# Patient Record
Sex: FEMALE | Race: WHITE | Employment: OTHER | URBAN - METROPOLITAN AREA
[De-identification: names, ages, dates, MRNs, and addresses within clinical notes are randomized per-mention and may not be internally consistent; named-entity substitution may affect disease eponyms.]

---

## 2017-01-09 ENCOUNTER — OFFICE VISIT (OUTPATIENT)
Dept: INTERNAL MEDICINE CLINIC | Age: 67
End: 2017-01-09

## 2017-01-09 ENCOUNTER — HOSPITAL ENCOUNTER (OUTPATIENT)
Dept: LAB | Age: 67
Discharge: HOME | End: 2017-01-09
Payer: MEDICARE

## 2017-01-09 VITALS
HEART RATE: 68 BPM | WEIGHT: 117 LBS | BODY MASS INDEX: 20.73 KG/M2 | SYSTOLIC BLOOD PRESSURE: 140 MMHG | OXYGEN SATURATION: 98 % | RESPIRATION RATE: 16 BRPM | TEMPERATURE: 98.2 F | HEIGHT: 63 IN | DIASTOLIC BLOOD PRESSURE: 82 MMHG

## 2017-01-09 DIAGNOSIS — R73.02 IMPAIRED GLUCOSE TOLERANCE: ICD-10-CM

## 2017-01-09 DIAGNOSIS — E78.5 DYSLIPIDEMIA: Primary | ICD-10-CM

## 2017-01-09 DIAGNOSIS — E56.9 VITAMIN DEFICIENCY: ICD-10-CM

## 2017-01-09 DIAGNOSIS — I10 HTN, GOAL BELOW 130/80: ICD-10-CM

## 2017-01-09 DIAGNOSIS — K76.0 NAFLD (NONALCOHOLIC FATTY LIVER DISEASE): ICD-10-CM

## 2017-01-09 PROCEDURE — 83036 HEMOGLOBIN GLYCOSYLATED A1C: CPT

## 2017-01-09 PROCEDURE — 80053 COMPREHEN METABOLIC PANEL: CPT

## 2017-01-09 PROCEDURE — 82306 VITAMIN D 25 HYDROXY: CPT

## 2017-01-09 PROCEDURE — 80061 LIPID PANEL: CPT

## 2017-01-09 PROCEDURE — 36415 COLL VENOUS BLD VENIPUNCTURE: CPT

## 2017-01-09 PROCEDURE — 84443 ASSAY THYROID STIM HORMONE: CPT

## 2017-01-09 RX ORDER — LOSARTAN POTASSIUM 100 MG/1
100 TABLET ORAL DAILY
Qty: 90 TAB | Refills: 2 | Status: SHIPPED | OUTPATIENT
Start: 2017-01-09 | End: 2017-02-15 | Stop reason: ALTCHOICE

## 2017-01-09 RX ORDER — LOVASTATIN 20 MG/1
TABLET ORAL
Qty: 90 TAB | Refills: 1 | Status: SHIPPED | OUTPATIENT
Start: 2017-01-09 | End: 2017-07-25 | Stop reason: SDUPTHER

## 2017-01-09 RX ORDER — ATENOLOL 50 MG/1
TABLET ORAL
Qty: 180 TAB | Refills: 1 | Status: SHIPPED | OUTPATIENT
Start: 2017-01-09 | End: 2017-07-25 | Stop reason: SDUPTHER

## 2017-01-09 NOTE — PROGRESS NOTES
HISTORY OF PRESENT ILLNESS  Kinga Whitney is a 78 y.o. female. HPI  Follow up, doing very well. Issues:  1. Hypertension. Tolerates the Losartan 50 mg. Energy is good. No chest pain or shortness of breath. Pressure is a bit high so we are going to bump to the 100 mg dose. 2. Dyslipidemia, on Lovastatin. No myalgias. Due for labs. 3. Intermittent episode of a cramp that she feels in her left upper quadrant, really only when under stress. Notes that when she is relaxed it does not happen. It is not associated with meals, nausea or vomiting, and has been intermittently present for at least a year. Review of Systems   Constitutional: Negative for chills, fever and weight loss. Respiratory: Negative for cough, shortness of breath and wheezing. Cardiovascular: Negative for chest pain, palpitations, orthopnea, leg swelling and PND. Gastrointestinal: Negative for abdominal pain, blood in stool, constipation, diarrhea, heartburn and nausea. Episodic cramp in abd when feels tense   Genitourinary: Negative for dysuria. Musculoskeletal: Negative for myalgias. Neurological: Negative for dizziness and headaches. Physical Exam   Constitutional: She is oriented to person, place, and time. She appears well-developed and well-nourished. HENT:   Head: Normocephalic and atraumatic. Mouth/Throat: Oropharynx is clear and moist.   Neck: Normal range of motion. Neck supple. Carotid bruit is not present. No thyromegaly present. Cardiovascular: Normal rate, regular rhythm, S1 normal, S2 normal, normal heart sounds and intact distal pulses. No murmur heard. Pulmonary/Chest: Effort normal and breath sounds normal. No respiratory distress. She has no wheezes. She has no rales. Abdominal: Soft. Bowel sounds are normal. She exhibits no distension and no mass. There is no tenderness. Musculoskeletal: She exhibits no edema.    Neurological: She is alert and oriented to person, place, and time.   Psychiatric: She has a normal mood and affect. Her behavior is normal.   Nursing note and vitals reviewed. ASSESSMENT and PLAN  Judi Higgins was seen today for cholesterol problem, labs and abdominal pain. Diagnoses and all orders for this visit:    Dyslipidemia  -     lovastatin (MEVACOR) 20 mg tablet; TAKE ONE TABLET BY MOUTH NIGHTLY  -     METABOLIC PANEL, COMPREHENSIVE  -     LIPID PANEL  -     TSH 3RD GENERATION    HTN, goal below 130/80  -     atenolol (TENORMIN) 50 mg tablet; TAKE ONE TABLET BY MOUTH TWICE DAILY  -     losartan (COZAAR) 100 mg tablet; Take 1 Tab by mouth daily.     NAFLD (nonalcoholic fatty liver disease)    Impaired glucose tolerance  -     HEMOGLOBIN A1C WITH EAG    Vitamin deficiency  -     VITAMIN D, 25 HYDROXY      the following changes in treatment are made: inc from 50 to 100 mg losartan  See me if any change in pattern of abd sxs-now only with stress and no change over the last year

## 2017-01-10 LAB
25(OH)D3+25(OH)D2 SERPL-MCNC: 56.4 NG/ML (ref 30–100)
ALBUMIN SERPL-MCNC: 4.7 G/DL (ref 3.6–4.8)
ALBUMIN/GLOB SERPL: 2.4 {RATIO} (ref 1.1–2.5)
ALP SERPL-CCNC: 72 IU/L (ref 39–117)
ALT SERPL-CCNC: 49 IU/L (ref 0–32)
AST SERPL-CCNC: 89 IU/L (ref 0–40)
BILIRUB SERPL-MCNC: 0.7 MG/DL (ref 0–1.2)
BUN SERPL-MCNC: 11 MG/DL (ref 8–27)
BUN/CREAT SERPL: 17 (ref 11–26)
CALCIUM SERPL-MCNC: 9.6 MG/DL (ref 8.7–10.3)
CHLORIDE SERPL-SCNC: 99 MMOL/L (ref 96–106)
CHOLEST SERPL-MCNC: 199 MG/DL (ref 100–199)
CO2 SERPL-SCNC: 26 MMOL/L (ref 18–29)
CREAT SERPL-MCNC: 0.66 MG/DL (ref 0.57–1)
EST. AVERAGE GLUCOSE BLD GHB EST-MCNC: 105 MG/DL
GLOBULIN SER CALC-MCNC: 2 G/DL (ref 1.5–4.5)
GLUCOSE SERPL-MCNC: 111 MG/DL (ref 65–99)
HBA1C MFR BLD: 5.3 % (ref 4.8–5.6)
HDLC SERPL-MCNC: 89 MG/DL
INTERPRETATION, 910389: NORMAL
LDLC SERPL CALC-MCNC: 84 MG/DL (ref 0–99)
POTASSIUM SERPL-SCNC: 4.6 MMOL/L (ref 3.5–5.2)
PROT SERPL-MCNC: 6.7 G/DL (ref 6–8.5)
SODIUM SERPL-SCNC: 142 MMOL/L (ref 134–144)
TRIGL SERPL-MCNC: 132 MG/DL (ref 0–149)
TSH SERPL DL<=0.005 MIU/L-ACNC: 2.1 UIU/ML (ref 0.45–4.5)
VLDLC SERPL CALC-MCNC: 26 MG/DL (ref 5–40)

## 2017-02-03 ENCOUNTER — HOSPITAL ENCOUNTER (INPATIENT)
Age: 67
LOS: 1 days | Discharge: HOME | DRG: 311 | End: 2017-02-04
Attending: EMERGENCY MEDICINE | Admitting: INTERNAL MEDICINE
Payer: MEDICARE

## 2017-02-03 DIAGNOSIS — R77.8 ELEVATED TROPONIN: Primary | ICD-10-CM

## 2017-02-03 PROBLEM — I21.4 NSTEMI (NON-ST ELEVATED MYOCARDIAL INFARCTION) (HCC): Status: ACTIVE | Noted: 2017-02-03

## 2017-02-03 LAB
ALBUMIN SERPL BCP-MCNC: 4.2 G/DL (ref 3.5–5)
ALBUMIN/GLOB SERPL: 1.2 {RATIO} (ref 1.1–2.2)
ALP SERPL-CCNC: 79 U/L (ref 45–117)
ALT SERPL-CCNC: 76 U/L (ref 12–78)
ANION GAP BLD CALC-SCNC: 8 MMOL/L (ref 5–15)
APPEARANCE UR: CLEAR
AST SERPL W P-5'-P-CCNC: 95 U/L (ref 15–37)
BACTERIA URNS QL MICRO: NEGATIVE /HPF
BASOPHILS # BLD AUTO: 0.1 K/UL (ref 0–0.1)
BASOPHILS # BLD: 1 % (ref 0–1)
BILIRUB SERPL-MCNC: 0.8 MG/DL (ref 0.2–1)
BILIRUB UR QL: NEGATIVE
BUN SERPL-MCNC: 14 MG/DL (ref 6–20)
BUN/CREAT SERPL: 21 (ref 12–20)
CALCIUM SERPL-MCNC: 9.1 MG/DL (ref 8.5–10.1)
CHLORIDE SERPL-SCNC: 103 MMOL/L (ref 97–108)
CO2 SERPL-SCNC: 28 MMOL/L (ref 21–32)
COLOR UR: ABNORMAL
CREAT SERPL-MCNC: 0.66 MG/DL (ref 0.55–1.02)
DIFFERENTIAL METHOD BLD: ABNORMAL
EOSINOPHIL # BLD: 0.1 K/UL (ref 0–0.4)
EOSINOPHIL NFR BLD: 1 % (ref 0–7)
EPITH CASTS URNS QL MICRO: ABNORMAL /LPF
ERYTHROCYTE [DISTWIDTH] IN BLOOD BY AUTOMATED COUNT: 11.9 % (ref 11.5–14.5)
GLOBULIN SER CALC-MCNC: 3.4 G/DL (ref 2–4)
GLUCOSE SERPL-MCNC: 103 MG/DL (ref 65–100)
GLUCOSE UR STRIP.AUTO-MCNC: NEGATIVE MG/DL
HCT VFR BLD AUTO: 39.4 % (ref 35–47)
HGB BLD-MCNC: 13 G/DL (ref 11.5–16)
HGB UR QL STRIP: ABNORMAL
KETONES UR QL STRIP.AUTO: ABNORMAL MG/DL
LEUKOCYTE ESTERASE UR QL STRIP.AUTO: ABNORMAL
LYMPHOCYTES # BLD AUTO: 34 % (ref 12–49)
LYMPHOCYTES # BLD: 2.5 K/UL (ref 0.8–3.5)
MCH RBC QN AUTO: 33.1 PG (ref 26–34)
MCHC RBC AUTO-ENTMCNC: 33 G/DL (ref 30–36.5)
MCV RBC AUTO: 100.3 FL (ref 80–99)
MONOCYTES # BLD: 0.8 K/UL (ref 0–1)
MONOCYTES NFR BLD AUTO: 10 % (ref 5–13)
NEUTS SEG # BLD: 4 K/UL (ref 1.8–8)
NEUTS SEG NFR BLD AUTO: 54 % (ref 32–75)
NITRITE UR QL STRIP.AUTO: NEGATIVE
PH UR STRIP: 6.5 [PH] (ref 5–8)
PLATELET # BLD AUTO: 169 K/UL (ref 150–400)
POTASSIUM SERPL-SCNC: 4.7 MMOL/L (ref 3.5–5.1)
PROT SERPL-MCNC: 7.6 G/DL (ref 6.4–8.2)
PROT UR STRIP-MCNC: NEGATIVE MG/DL
RBC # BLD AUTO: 3.93 M/UL (ref 3.8–5.2)
RBC #/AREA URNS HPF: ABNORMAL /HPF (ref 0–5)
SODIUM SERPL-SCNC: 139 MMOL/L (ref 136–145)
SP GR UR REFRACTOMETRY: 1.01 (ref 1–1.03)
TROPONIN I SERPL-MCNC: 0.14 NG/ML
UROBILINOGEN UR QL STRIP.AUTO: 0.2 EU/DL (ref 0.2–1)
WBC # BLD AUTO: 7.4 K/UL (ref 3.6–11)
WBC URNS QL MICRO: ABNORMAL /HPF (ref 0–4)

## 2017-02-03 PROCEDURE — 80053 COMPREHEN METABOLIC PANEL: CPT | Performed by: EMERGENCY MEDICINE

## 2017-02-03 PROCEDURE — 84484 ASSAY OF TROPONIN QUANT: CPT | Performed by: EMERGENCY MEDICINE

## 2017-02-03 PROCEDURE — 65270000029 HC RM PRIVATE

## 2017-02-03 PROCEDURE — 99285 EMERGENCY DEPT VISIT HI MDM: CPT

## 2017-02-03 PROCEDURE — 36415 COLL VENOUS BLD VENIPUNCTURE: CPT | Performed by: EMERGENCY MEDICINE

## 2017-02-03 PROCEDURE — 93005 ELECTROCARDIOGRAM TRACING: CPT

## 2017-02-03 PROCEDURE — 94761 N-INVAS EAR/PLS OXIMETRY MLT: CPT

## 2017-02-03 PROCEDURE — 74011250637 HC RX REV CODE- 250/637: Performed by: EMERGENCY MEDICINE

## 2017-02-03 PROCEDURE — 83036 HEMOGLOBIN GLYCOSYLATED A1C: CPT | Performed by: INTERNAL MEDICINE

## 2017-02-03 PROCEDURE — 74011250636 HC RX REV CODE- 250/636: Performed by: INTERNAL MEDICINE

## 2017-02-03 PROCEDURE — 85025 COMPLETE CBC W/AUTO DIFF WBC: CPT | Performed by: EMERGENCY MEDICINE

## 2017-02-03 PROCEDURE — 81001 URINALYSIS AUTO W/SCOPE: CPT | Performed by: EMERGENCY MEDICINE

## 2017-02-03 RX ORDER — ENOXAPARIN SODIUM 100 MG/ML
40 INJECTION SUBCUTANEOUS EVERY 24 HOURS
Status: DISCONTINUED | OUTPATIENT
Start: 2017-02-03 | End: 2017-02-04 | Stop reason: HOSPADM

## 2017-02-03 RX ORDER — SODIUM CHLORIDE 0.9 % (FLUSH) 0.9 %
5-10 SYRINGE (ML) INJECTION EVERY 8 HOURS
Status: DISCONTINUED | OUTPATIENT
Start: 2017-02-03 | End: 2017-02-04 | Stop reason: HOSPADM

## 2017-02-03 RX ORDER — HYDROCODONE BITARTRATE AND ACETAMINOPHEN 5; 325 MG/1; MG/1
1 TABLET ORAL
Status: DISCONTINUED | OUTPATIENT
Start: 2017-02-03 | End: 2017-02-04 | Stop reason: HOSPADM

## 2017-02-03 RX ORDER — SODIUM CHLORIDE 0.9 % (FLUSH) 0.9 %
5-10 SYRINGE (ML) INJECTION AS NEEDED
Status: DISCONTINUED | OUTPATIENT
Start: 2017-02-03 | End: 2017-02-04 | Stop reason: HOSPADM

## 2017-02-03 RX ORDER — LOVASTATIN 20 MG/1
20 TABLET ORAL DAILY
Status: DISCONTINUED | OUTPATIENT
Start: 2017-02-04 | End: 2017-02-04 | Stop reason: HOSPADM

## 2017-02-03 RX ORDER — LOSARTAN POTASSIUM 50 MG/1
100 TABLET ORAL DAILY
Status: DISCONTINUED | OUTPATIENT
Start: 2017-02-04 | End: 2017-02-04 | Stop reason: HOSPADM

## 2017-02-03 RX ORDER — ASPIRIN 81 MG/1
81 TABLET ORAL DAILY
Status: DISCONTINUED | OUTPATIENT
Start: 2017-02-04 | End: 2017-02-04 | Stop reason: HOSPADM

## 2017-02-03 RX ORDER — GUAIFENESIN 100 MG/5ML
324 LIQUID (ML) ORAL
Status: COMPLETED | OUTPATIENT
Start: 2017-02-03 | End: 2017-02-03

## 2017-02-03 RX ORDER — ATENOLOL 50 MG/1
50 TABLET ORAL EVERY 12 HOURS
Status: DISCONTINUED | OUTPATIENT
Start: 2017-02-04 | End: 2017-02-04 | Stop reason: HOSPADM

## 2017-02-03 RX ORDER — HYDRALAZINE HYDROCHLORIDE 20 MG/ML
20 INJECTION INTRAMUSCULAR; INTRAVENOUS
Status: DISCONTINUED | OUTPATIENT
Start: 2017-02-03 | End: 2017-02-04 | Stop reason: HOSPADM

## 2017-02-03 RX ADMIN — Medication 10 ML: at 23:40

## 2017-02-03 RX ADMIN — ENOXAPARIN SODIUM 40 MG: 40 INJECTION SUBCUTANEOUS at 23:40

## 2017-02-03 RX ADMIN — ASPIRIN 81 MG CHEWABLE TABLET 324 MG: 81 TABLET CHEWABLE at 23:39

## 2017-02-03 NOTE — IP AVS SNAPSHOT
Bhupinder Handy 
 
 
 [address] 100[address] [facility] 
[phone] Patient: Jenni Enciso MRN: [id number] VQY:0/4/0101 You are allergic to the following Allergen Reactions Shellfish Derived Diarrhea Nausea and Vomiting Recent Documentation Height Weight BMI OB Status Smoking Status 1.6 m 53 kg 20.71 kg/m2 Hysterectomy Former Smoker Unresulted Labs Order Current Status CULTURE, URINE In process Emergency Contacts Name Discharge Info Relation Home Work Mobile Anibal Pathak DISCHARGE CAREGIVER [3] Child [2] [phone] About your hospitalization You were admitted on:  February 3, 2017 You last received care in the:  [facility] 3 PRO CARE TELE 2 You were discharged on:  February 4, 2017 Unit phone number:  [phone] Why you were hospitalized Your primary diagnosis was:  Not on File Your diagnoses also included:  Impaired Glucose Tolerance, Dyslipidemia, Htn, Goal Below 130/80, Pyuria Providers Seen During Your Hospitalizations Provider Role Specialty Primary office phone Micah Underwood MD Attending Provider Emergency Medicine [phone] Ulysses Patience, DO Attending Provider Internal Medicine [phone] Reza Stinson MD Attending Provider Internal Medicine [phone] Your Primary Care Physician (PCP) Primary Care Physician Office Phone Office Fax Colletta Bond [phone] Follow-up Information Follow up With Details Comments Contact Info Dat Torres MD In 1 week  [address] 1007 [facility] 
[phone] Lesli Barr MD  as instructed, for outpatient stress test [address] [place] [address] [place]  [address] [facility] 
[phone] Current Discharge Medication List  
  
START taking these medications Dose & Instructions Dispensing Information Comments Morning Noon Evening Bedtime  
 cefdinir 300 mg capsule Commonly known as:  OMNICEF Your next dose is: Today, Tomorrow Other:  _________ Dose:  300 mg Take 1 Cap by mouth two (2) times a day for 3 days. Quantity:  6 Cap Refills:  0 CONTINUE these medications which have NOT CHANGED Dose & Instructions Dispensing Information Comments Morning Noon Evening Bedtime  
 aspirin 81 mg chewable tablet Your next dose is: Today, Tomorrow Other:  _________ Dose:  81 mg Take 81 mg by mouth daily. Refills:  0  
     
   
   
   
  
 atenolol 50 mg tablet Commonly known as:  TENORMIN Your next dose is: Today, Tomorrow Other:  _________ TAKE ONE TABLET BY MOUTH TWICE DAILY Quantity:  180 Tab Refills:  1 Cholecalciferol (Vitamin D3) 2,000 unit Cap capsule Commonly known as:  VITAMIN D3 Your next dose is: Today, Tomorrow Other:  _________ Dose:  2000 Units Take 2,000 Units by mouth daily. Quantity:  30 Cap Refills:  5  
     
   
   
   
  
 losartan 100 mg tablet Commonly known as:  COZAAR Your next dose is: Today, Tomorrow Other:  _________ Dose:  100 mg Take 1 Tab by mouth daily. Quantity:  90 Tab Refills:  2  
     
   
   
   
  
 lovastatin 20 mg tablet Commonly known as:  MEVACOR Your next dose is: Today, Tomorrow Other:  _________ TAKE ONE TABLET BY MOUTH NIGHTLY Quantity:  90 Tab Refills:  1  
     
   
   
   
  
 multivitamin, tx-iron-ca-min 9 mg iron-400 mcg Tab tablet Commonly known as:  THERA-M w/ IRON Your next dose is: Today, Tomorrow Other:  _________ Dose:  1 Tab Take 1 Tab by mouth daily. Refills:  0  
     
   
   
   
  
 omega-3 fatty acids-vitamin e 1,000 mg Cap Your next dose is: Today, Tomorrow Other:  _________ Dose:  2 Cap Take 2 Caps by mouth. Refills:  0  
     
   
   
   
  
 OTHER Your next dose is: Today, Tomorrow Other:  _________ Refills:  0 Where to Get Your Medications Information on where to get these meds will be given to you by the nurse or doctor. ! Ask your nurse or doctor about these medications  
  cefdinir 300 mg capsule Discharge Instructions HOSPITALIST DISCHARGE INSTRUCTIONS 
NAME: Ahmet Cardoza :  1950 MRN:  [id number] Date/Time:  2017 10:12 AM 
 
ADMIT DATE: 2/3/2017 DISCHARGE DATE: 2017 PRINCIPAL DISCHARGE DIAGNOSES: 
Elevated troponin, ruled out myocardial infarction MEDICATIONS: 
· It is important that you take the medication exactly as they are prescribed. Note the changes and additions to your medications. Be sure you understand these changes before you are discharged today. · Keep your medication in the bottles provided by the pharmacist and keep a list of the medication names, dosages, and times to be taken in your wallet. · Do not take other medications without consulting your doctor. Pain Management: per above medications What to do at [facility] Recommended diet:  {diet:84513} Recommended activity: {discharge activity:69354} If you experience any of the following symptoms then please call your primary care physician or return to the emergency room if you cannot get hold of your doctor: 
Fever, chills, severe abdominal pain, nausea, vomiting, diarrhea, change in mentation, falling, bleeding, severe chest pain, shortness of breath, or other severe concerning symptoms that brought you to the hospital in the first place Follow Up: Follow-up Information Follow up With Details Comments Contact Info Chase Andres MD In 1 week  [place] [address] [address] 100[address] [facility] 
[phone] Rhoda Cantu MD  as instructed, for outpatient stress test [address] [place] [address] [phone] 1007 [facility] 
[phone] Information obtained by : 
I understand that if any problems occur once I am at home I am to contact my physician. I understand and acknowledge receipt of the instructions indicated above. Physician's or R.N.'s Signature                                                                  Date/Time Patient or Representative Signature                                                          Date/Time The cardiologist you saw in the hospital was: 
Rhoda Cantu MD 
[address] [place] [address] [place] [address] 
[phone] You should hear from our office Monday. If not, please call. Discharge Orders None [employer] Announcement We are excited to announce that we are making your provider's discharge notes available to you in [employer]. You will see these notes when they are completed and signed by the physician that discharged you from your recent hospital stay. If you have any questions or concerns about any information you see in [employer], please call the Health Information Department where you were seen or reach out to your Primary Care Provider for more information about your plan of care. Introducing [facility]! Conner Campbell introduces [employer] patient portal. Now you can access parts of your medical record, email your doctor's office, and request medication refills online. 1. In your internet browser, go to [email or web address] 2. Click on the First Time User? Click Here link in the Sign In box. You will see the New Member Sign Up page. 3. Enter your [employer] Access Code exactly as it appears below. You will not need to use this code after youve completed the sign-up process. If you do not sign up before the expiration date, you must request a new code. · [employer] Access Code: [email or web address] Expires: 4/9/2017  8:23 AM 
 
4. Enter the last four digits of your Social Security Number (xxxx) and Date of Birth (mm/dd/yyyy) as indicated and click Submit. You will be taken to the next sign-up page. 5. Create a [employer] ID. This will be your [employer] login ID and cannot be changed, so think of one that is secure and easy to remember. 6. Create a [employer] password. You can change your password at any time. 7. Enter your Password Reset Question and Answer. This can be used at a later time if you forget your password. 8. Enter your e-mail address. You will receive e-mail notification when new information is available in [address] 9. Click Sign Up. You can now view and download portions of your medical record. 10. Click the Download Summary menu link to download a portable copy of your medical information. If you have questions, please visit the Frequently Asked Questions section of the [employer] website. Remember, [employer] is NOT to be used for urgent needs. For medical emergencies, dial 911. Now available from your iPhone and Android! General Information Please provide this summary of care documentation to your next provider. Patient Signature:  ____________________________________________________________ Date:  ____________________________________________________________  
  
Otto Sravanugie Provider Signature:  ____________________________________________________________ Date:  ____________________________________________________________

## 2017-02-03 NOTE — IP AVS SNAPSHOT
Current Discharge Medication List  
  
Take these medications at their scheduled times Dose & Instructions Dispensing Information Comments Morning Noon Evening Bedtime  
 aspirin 81 mg chewable tablet Your next dose is: Today, Tomorrow Other:  ____________ Dose:  81 mg Take 81 mg by mouth daily. Refills:  0  
     
   
   
   
  
 cefdinir 300 mg capsule Commonly known as:  OMNICEF Your next dose is: Today, Tomorrow Other:  ____________ Dose:  300 mg Take 1 Cap by mouth two (2) times a day for 3 days. Quantity:  6 Cap Refills:  0 Cholecalciferol (Vitamin D3) 2,000 unit Cap capsule Commonly known as:  VITAMIN D3 Your next dose is: Today, Tomorrow Other:  ____________ Dose:  2000 Units Take 2,000 Units by mouth daily. Quantity:  30 Cap Refills:  5  
     
   
   
   
  
 losartan 100 mg tablet Commonly known as:  COZAAR Your next dose is: Today, Tomorrow Other:  ____________ Dose:  100 mg Take 1 Tab by mouth daily. Quantity:  90 Tab Refills:  2  
     
   
   
   
  
 multivitamin, tx-iron-ca-min 9 mg iron-400 mcg Tab tablet Commonly known as:  THERA-M w/ IRON Your next dose is: Today, Tomorrow Other:  ____________ Dose:  1 Tab Take 1 Tab by mouth daily. Refills:  0 Take these medications as directed Dose & Instructions Dispensing Information Comments Morning Noon Evening Bedtime  
 atenolol 50 mg tablet Commonly known as:  TENORMIN Your next dose is: Today, Tomorrow Other:  ____________ TAKE ONE TABLET BY MOUTH TWICE DAILY Quantity:  180 Tab Refills:  1  
     
   
   
   
  
 lovastatin 20 mg tablet Commonly known as:  MEVACOR Your next dose is: Today, Tomorrow Other:  ____________ TAKE ONE TABLET BY MOUTH NIGHTLY Quantity:  90 Tab Refills:  1  
     
   
   
   
  
 omega-3 fatty acids-vitamin e 1,000 mg Cap Your next dose is: Today, Tomorrow Other:  ____________ Dose:  2 Cap Take 2 Caps by mouth. Refills:  0  
     
   
   
   
  
 OTHER Your next dose is: Today, Tomorrow Other:  ____________ Refills:  0 Where to Get Your Medications Information about where to get these medications is not yet available ! Ask your nurse or doctor about these medications  
  cefdinir 300 mg capsule

## 2017-02-04 ENCOUNTER — APPOINTMENT (OUTPATIENT)
Dept: GENERAL RADIOLOGY | Age: 67
DRG: 311 | End: 2017-02-04
Attending: INTERNAL MEDICINE
Payer: MEDICARE

## 2017-02-04 VITALS
HEART RATE: 64 BPM | OXYGEN SATURATION: 96 % | WEIGHT: 116.9 LBS | DIASTOLIC BLOOD PRESSURE: 68 MMHG | TEMPERATURE: 97.9 F | BODY MASS INDEX: 20.71 KG/M2 | HEIGHT: 63 IN | SYSTOLIC BLOOD PRESSURE: 139 MMHG | RESPIRATION RATE: 16 BRPM

## 2017-02-04 PROBLEM — R82.81 PYURIA: Status: ACTIVE | Noted: 2017-02-04

## 2017-02-04 LAB
APPEARANCE UR: ABNORMAL
ATRIAL RATE: 65 BPM
BACTERIA URNS QL MICRO: NEGATIVE /HPF
BILIRUB UR QL: NEGATIVE
CALCULATED P AXIS, ECG09: 64 DEGREES
CALCULATED R AXIS, ECG10: -16 DEGREES
CALCULATED T AXIS, ECG11: 64 DEGREES
CHOLEST SERPL-MCNC: 194 MG/DL
COLOR UR: ABNORMAL
DIAGNOSIS, 93000: NORMAL
EPITH CASTS URNS QL MICRO: ABNORMAL /LPF
EST. AVERAGE GLUCOSE BLD GHB EST-MCNC: 103 MG/DL
GLUCOSE UR STRIP.AUTO-MCNC: NEGATIVE MG/DL
HBA1C MFR BLD: 5.2 % (ref 4.2–6.3)
HDLC SERPL-MCNC: 72 MG/DL
HDLC SERPL: 2.7 {RATIO} (ref 0–5)
HGB UR QL STRIP: NEGATIVE
KETONES UR QL STRIP.AUTO: 40 MG/DL
LDLC SERPL CALC-MCNC: 77.8 MG/DL (ref 0–100)
LEUKOCYTE ESTERASE UR QL STRIP.AUTO: ABNORMAL
LIPID PROFILE,FLP: ABNORMAL
MUCOUS THREADS URNS QL MICRO: ABNORMAL /LPF
NITRITE UR QL STRIP.AUTO: NEGATIVE
P-R INTERVAL, ECG05: 182 MS
PH UR STRIP: 6 [PH] (ref 5–8)
PROT UR STRIP-MCNC: NEGATIVE MG/DL
Q-T INTERVAL, ECG07: 434 MS
QRS DURATION, ECG06: 86 MS
QTC CALCULATION (BEZET), ECG08: 451 MS
RBC #/AREA URNS HPF: ABNORMAL /HPF (ref 0–5)
SP GR UR REFRACTOMETRY: 1.02 (ref 1–1.03)
TRIGL SERPL-MCNC: 221 MG/DL (ref ?–150)
TROPONIN I SERPL-MCNC: <0.04 NG/ML
TROPONIN I SERPL-MCNC: <0.04 NG/ML
UA: UC IF INDICATED,UAUC: ABNORMAL
UROBILINOGEN UR QL STRIP.AUTO: 0.2 EU/DL (ref 0.2–1)
VENTRICULAR RATE, ECG03: 65 BPM
VLDLC SERPL CALC-MCNC: 44.2 MG/DL
WBC URNS QL MICRO: ABNORMAL /HPF (ref 0–4)

## 2017-02-04 PROCEDURE — 93005 ELECTROCARDIOGRAM TRACING: CPT

## 2017-02-04 PROCEDURE — 36415 COLL VENOUS BLD VENIPUNCTURE: CPT | Performed by: INTERNAL MEDICINE

## 2017-02-04 PROCEDURE — 84484 ASSAY OF TROPONIN QUANT: CPT | Performed by: INTERNAL MEDICINE

## 2017-02-04 PROCEDURE — 80061 LIPID PANEL: CPT | Performed by: INTERNAL MEDICINE

## 2017-02-04 PROCEDURE — 74011250637 HC RX REV CODE- 250/637: Performed by: INTERNAL MEDICINE

## 2017-02-04 PROCEDURE — 87086 URINE CULTURE/COLONY COUNT: CPT | Performed by: INTERNAL MEDICINE

## 2017-02-04 PROCEDURE — 81001 URINALYSIS AUTO W/SCOPE: CPT | Performed by: INTERNAL MEDICINE

## 2017-02-04 PROCEDURE — 93306 TTE W/DOPPLER COMPLETE: CPT

## 2017-02-04 PROCEDURE — 71010 XR CHEST PORT: CPT

## 2017-02-04 RX ORDER — CEFDINIR 300 MG/1
300 CAPSULE ORAL 2 TIMES DAILY
Qty: 6 CAP | Refills: 0 | Status: SHIPPED | OUTPATIENT
Start: 2017-02-04 | End: 2017-02-07

## 2017-02-04 RX ADMIN — LOVASTATIN 20 MG: 20 TABLET ORAL at 09:08

## 2017-02-04 RX ADMIN — ASPIRIN 81 MG: 81 TABLET, COATED ORAL at 09:08

## 2017-02-04 RX ADMIN — Medication 10 ML: at 05:47

## 2017-02-04 RX ADMIN — LOSARTAN POTASSIUM 100 MG: 50 TABLET, FILM COATED ORAL at 09:08

## 2017-02-04 RX ADMIN — ATENOLOL 50 MG: 50 TABLET ORAL at 09:08

## 2017-02-04 NOTE — ED PROVIDER NOTES
HPI Comments: King Tejada is a 77 yo F with high blood pressure. She states that she has been anxious and \"not feeling right\" thorughout the day. She checked her blood pressure at home and it has been elevated. She called her son several times today saying that she did not feel well and once complained of tightness in her chest and tonight asked if she could stay with him. Tonight she was at his house watching movies with the grand kids and seemed fidgety. This made her son worried because several years ago she had very vague symptoms leading to very severe pneumonia and had ended up in hospital on ventilator. Patient denies shortness of breath or chest pain. Past Medical History:   Diagnosis Date    HTN, goal below 130/80 11/9/2015    Impaired glucose tolerance 11/9/2015    Osteopenia 11/9/2015       Past Surgical History:   Procedure Laterality Date    Hx appendectomy  1963    Hx gyn       c section    Hx orthopaedic  1/29/13     right foot    Hx colonoscopy  6/11         Family History:   Problem Relation Age of Onset    Hypertension Father     Hypertension Sister        Social History     Social History    Marital status:      Spouse name: N/A    Number of children: N/A    Years of education: N/A     Occupational History    Not on file. Social History Main Topics    Smoking status: Former Smoker     Years: 15.00     Quit date: 9/1/2012    Smokeless tobacco: Not on file    Alcohol use 0.0 oz/week     0 Standard drinks or equivalent per week    Drug use: No    Sexual activity: Not on file     Other Topics Concern    Not on file     Social History Narrative         ALLERGIES: Shellfish derived    Review of Systems   Constitutional: Negative for fever. HENT: Negative for sore throat. Eyes: Negative for visual disturbance. Respiratory: Negative for cough and shortness of breath. Cardiovascular: Negative for chest pain.         Elevated blood pressure Gastrointestinal: Negative for abdominal pain. Genitourinary: Negative for dysuria. Musculoskeletal: Negative for back pain. Skin: Negative for rash. Neurological: Negative for headaches. Psychiatric/Behavioral: The patient is nervous/anxious. Vitals:    02/03/17 2212 02/03/17 2217 02/03/17 2230 02/03/17 2300   BP: 158/78 158/78 173/77 149/69   Pulse: 66 65 62 60   Resp: 17 13 11 13   Temp:  97.8 °F (36.6 °C)     SpO2:  98% 98% 98%   Weight:  54.4 kg (120 lb)     Height:  5' 3\" (1.6 m)              Physical Exam   Constitutional: She appears well-developed and well-nourished. No distress. HENT:   Head: Normocephalic and atraumatic. Mouth/Throat: Oropharynx is clear and moist.   Eyes: Conjunctivae and EOM are normal.   Neck: Normal range of motion and phonation normal.   Cardiovascular: Normal rate, regular rhythm, normal heart sounds and intact distal pulses. No murmur heard. Pulmonary/Chest: Effort normal and breath sounds normal. No respiratory distress. She has no wheezes. She has no rales. Abdominal: Soft. She exhibits no distension. There is no tenderness. There is no rebound. Musculoskeletal: Normal range of motion. She exhibits no tenderness. Neurological: She is alert. She is not disoriented. No cranial nerve deficit. She exhibits normal muscle tone. Coordination normal.   Skin: Skin is warm and dry. Nursing note and vitals reviewed. ED EKG interpretation:  Rhythm: normal sinus rhythm and PVC's; and regular . Rate (approx.): 65; Axis: normal; P wave: normal; QRS interval: normal ; ST/T wave: non-specific changes; Other findings: compared to EKG on 5/11/16, PVC is new, bradycardia is resolved, otherwise unchanged. This EKG was interpreted by Jessie Stapleton MD,ED Provider. [facility]  ED Course     11:23 PM  Labs reviewed and are significant for trop 0.14. Discussed with patient and her son who agree for plan for obervation in hospital.  Patient takes 81mg aspirin daily. Will order full dose. PAtient states that she feels fine now. 11:27 PM  Discussed with Dr. Yazmin Weir, will admit. 11:42 PM  Patients son, Dr. Elsi Mackey, may be reached at [phone].   Procedures

## 2017-02-04 NOTE — H&P
Admission History and Physical      NAME:  Azra Cobb   :   1950   MRN:  [id number]     PCP:  Augustine Malik MD     Date/Time:  2/3/2017           Assessment/Plan:       Active Problems:    NSTEMI (non-ST elevated myocardial infarction) / Chest tightness: ECG without ST elevation but is unusual (see interpretation below). ACS v. Stress from increased afterload. Admit to telemetry. Cardiology consult. Loaded with ASA in ED, continue daily. Serial Sushma and ECG, if Troponin continues to rise, will anticoagulate with LMWH. Check TTE. NPO at MN. Risk stratification with hgbA1c and lipid panel. Continue BB, ARB, statin (though should be on higher potency I suspect). HTN, goal below 130/80: elevated and not at goal. Just had losartan increased to 100 mg by PCP. Continue home losartan and atenolol for now. PRN Hydralazine. Suspect that she may need additional agent v. uptitrate of atenolol. Dyslipidemia: check lipid panel as she is on lower potency. Continue statin for now    Impaired glucose tolerance: Check hgbA1c       Subjective:     CHIEF COMPLAINT: Chest tightness    HISTORY OF PRESENT ILLNESS:     Ms. Kendal Chi is a 78 y.o.  female with a hx of XOL, HTN who is admitted with elevated BP, chest tightness found to have an elevated troponin. Ms. Kendal Chi [facility] to [facility] ED complaining of less than 1 day of elevated blood pressure combined with vague, intermittent chest tightness. She was spurred to check BP after noticing a spontaneous nose bleed. SBP was as high as 199 today and this caused her to be anxious. She went to stay with son (Dr. Kendal Chi) and he felt that she just didn't look right. In ED, found to have elevated troponin. We will admit for further management.       Past Medical History   Diagnosis Date    HTN, goal below 130/80 2015    Impaired glucose tolerance 2015    Osteopenia 2015        Past Surgical History   Procedure Laterality Date    Hx appendectomy  1963    Hx gyn       c section    Hx orthopaedic  1/29/13     right foot    Hx colonoscopy  6/11       Social History   Substance Use Topics    Smoking status: Former Smoker     Years: 15.00     Quit date: 9/1/2012    Smokeless tobacco: Not on file    Alcohol use 0.0 oz/week     0 Standard drinks or equivalent per week        Family History   Problem Relation Age of Onset    Hypertension Father     Hypertension Sister         Allergies   Allergen Reactions    Shellfish Derived Diarrhea and Nausea and Vomiting        Prior to Admission medications    Medication Sig Start Date End Date Taking? Authorizing Provider   atenolol (TENORMIN) 50 mg tablet TAKE ONE TABLET BY MOUTH TWICE DAILY 1/9/17  Yes Jamie Peterson MD   losartan (COZAAR) 100 mg tablet Take 1 Tab by mouth daily. 1/9/17  Yes Jamie Peterson MD   lovastatin (MEVACOR) 20 mg tablet TAKE ONE TABLET BY MOUTH NIGHTLY 1/9/17  Yes Jamie Peterson MD   omega-3 fatty acids-vitamin e 1,000 mg cap Take 2 Caps by mouth. Yes Historical Provider   Cholecalciferol, Vitamin D3, (VITAMIN D3) 2,000 unit cap capsule Take 2,000 Units by mouth daily. 11/10/15  Yes Jamie Peterson MD   multivitamin, tx-iron-ca-min (THERA-M W/ IRON) 9 mg iron-400 mcg tab tablet Take 1 Tab by mouth daily. Yes Historical Provider   OTHER    Yes Historical Provider   aspirin 81 mg chewable tablet Take 81 mg by mouth daily.    Yes Historical Provider         Review of Systems:  (bold if positive, if negative)    Gen:  Eyes:  ENT:  CVS:  Pulm:  GI:    :    MS:  Skin:  Psych:  Endo:    Hem:  Renal:    Neuro:            Objective:      VITALS:    Vital signs reviewed; most recent are:    Visit Vitals    /69 (BP 1 Location: Left arm, BP Patient Position: Sitting)    Pulse 60    Temp 97.8 °F (36.6 °C)    Resp 13    Ht 5' 3\" (1.6 m)    Wt 54.4 kg (120 lb)    SpO2 98%    BMI 21.26 kg/m2     SpO2 Readings from Last 6 Encounters:   02/03/17 98%   01/09/17 98%   05/11/16 98%        No intake or output data in the 24 hours ending 02/03/17 2330         Exam:     Physical Exam:    Gen:  Well-developed, well-nourished, in no acute distress  HEENT:  Pink conjunctivae, PERRL, hearing intact to voice, moist mucous membranes  Neck:  Supple, without masses, thyroid non-tender  Resp:  No accessory muscle use, clear breath sounds without wheezes rales or rhonchi  Card:  2/6 mid-diastolic murmur appreciated at A and P posts, normal S1, S2 without thrills, bruits or peripheral edema  Abd:  Soft, non-tender, non-distended, normoactive bowel sounds are present  Lymph:  No cervical adenopathy  Musc:  No cyanosis or clubbing  Skin:  No rashes or ulcers, skin turgor is good  Neuro:  Cranial nerves 3-12 are grossly intact,  strength is 5/5 bilaterally, dorsi / plantarflexion strength is 5/5 bilaterally, follows commands appropriately  Psych:  Alert with good insight. Oriented to person, place, and time       Labs:    Recent Labs      02/03/17   2224   WBC  7.4   HGB  13.0   HCT  39.4   PLT  169     Recent Labs      02/03/17   2224   NA  139   K  4.7   CL  103   CO2  28   GLU  103*   BUN  14   CREA  0.66   CA  9.1   ALB  4.2   TBILI  0.8   SGOT  95*   ALT  76     No results found for: GLUCPOC  No results for input(s): PH, PCO2, PO2, HCO3, FIO2 in the last 72 hours. No results for input(s): INR in the last 72 hours. No lab exists for component: INREXT    Chest Xray:  pending  EKG reviewed:   I have personally reviewed this ECG. This is a difficult ECG to interpret. This appears to be sinus rhythm with a PVC but on lead II rhythm strip, there appears to be ?unconducted P wave. P wave morphology appears homogenous though making me lean towards sinus. No previous ECGs. We will need serial studies    Medical records reviewed in preparation for this admission: Old medical records.     Surrogate decision maker:  patient and son    Total time spent with patient: 79 Minutes Care Plan discussed with: Patient, Family, Nursing Staff and >50% of time spent in counseling and coordination of care    Discussed:  Care Plan and D/C Planning    Prophylaxis:  Lovenox    Probable Disposition:  Home w/Family           ___________________________________________________    Attending Physician: Bre Avila DO

## 2017-02-04 NOTE — PROGRESS NOTES
2249:  TRANSFER - IN REPORT:    Verbal report received from Dillon Rodrigues RN (name) on Kashif Galeano  being received from [facility]) for routine progression of care      Report consisted of patients Situation, Background, Assessment and   Recommendations(SBAR). Information from the following report(s) SBAR, Kardex, ED Summary, Intake/Output, MAR, Accordion, Recent Results, Med Rec Status and Cardiac Rhythm NSR was reviewed with the receiving nurse. Opportunity for questions and clarification was provided. 0110: Assessment completed upon patients arrival to unit and care assumed. Dr. Turner Fly in to see patient. EKG performed and labs drawn. Dr. Yue Medina reviewed patient's EKG and spoke with patient's son.     0130: Primary Nurse Jo Leahy and SUJATHA Garrison performed a dual skin assessment on this patient No impairment noted  Thong score is 23

## 2017-02-04 NOTE — ED TRIAGE NOTES
Patient c/o her BP being high all day and then developing anxiety with it as the day went on. Has felt intermittently cold and hot today. \"No pain but I feel tightness with breathing, but not right now. \" No headache no throat soreness, no recent illnesses.

## 2017-02-04 NOTE — ED NOTES
Bedside shift change report given to Renetta Carlisle RN (oncoming nurse) by SUJATHA MARLEY (offgoing nurse). Report included the following information SBAR.

## 2017-02-04 NOTE — ED NOTES
Bedside report/ timeout given to [facility].   Verification of hospital location [facility] and room 326 completed with EMS provider [facility]

## 2017-02-04 NOTE — ED NOTES
Bedside verbal report received from Moses Robles RN. Pt A&O x 4. Pt denies pain at present. Dr Petar Oviedo at bedside, plan of care explained and all questions answered. Family at bedside.

## 2017-02-04 NOTE — DISCHARGE INSTRUCTIONS
HOSPITALIST DISCHARGE INSTRUCTIONS  NAME: Palmer Pedroza   :  1950   MRN:  [id number]     Date/Time:  2017 10:12 AM    ADMIT DATE: 2/3/2017     DISCHARGE DATE: 2017     PRINCIPAL DISCHARGE DIAGNOSES:  Elevated troponin, ruled out myocardial infarction    MEDICATIONS:  · It is important that you take the medication exactly as they are prescribed. Note the changes and additions to your medications. Be sure you understand these changes before you are discharged today. · Keep your medication in the bottles provided by the pharmacist and keep a list of the medication names, dosages, and times to be taken in your wallet. · Do not take other medications without consulting your doctor. Pain Management: per above medications    What to do at Home    Recommended diet:  Cardiac Diet    Recommended activity: Activity as tolerated    If you experience any of the following symptoms then please call your primary care physician or return to the emergency room if you cannot get hold of your doctor:  Fever, chills, severe abdominal pain, nausea, vomiting, diarrhea, change in mentation, falling, bleeding, severe chest pain, shortness of breath, or other severe concerning symptoms that brought you to the hospital in the first place    Follow Up: Follow-up Information     Follow up With Details Comments [address], [place] In 1 week  [facility]  [facility] [phone]      Nelsy Corley MD  as instructed, for outpatient stress test [address] [place] [address]  [phone]              Information obtained by :  I understand that if any problems occur once I am at home I am to contact my physician. I understand and acknowledge receipt of the instructions indicated above.                                                                                                                                            Physician's or R.N.'s Signature                                                                  Date/Time                                                                                                                                              Patient or Representative Signature                                                          Date/Time      The cardiologist you saw in the hospital was:  Kin Purcell MD  [address]  [place] [address]  [phone]  You should hear from our office Monday. If not, please call.

## 2017-02-04 NOTE — ED NOTES
Ambulated with a steady gait to the bathroom to give a voided UA sample. No more c/o tightness with her breathing. Family at bedside, all engaged in conversation.

## 2017-02-04 NOTE — PROGRESS NOTES
Bedside and Verbal shift change report given to Angel Llamas RN (oncoming nurse) by Abena Niño RN (offgoing nurse). Report included the following information SBAR, Kardex, MAR, Recent Results and Cardiac Rhythm NSR;inverted p-wave     Echo today and cardio consult     Cardio ok to discharge and follow up outpatient with stress test. Trop trended down blood pressure down. Patient feeling a lot better. New order to discharge patient. Discharge instructions reviewed with sone and patient. Medications/side effects reviewed. New antibiotic for UTI. Culture to follow. No questions noted. IV removed cath tip intact. Cardiac monitor removed and tele notified. Current Discharge Medication List      START taking these medications    Details   cefdinir (OMNICEF) 300 mg capsule Take 1 Cap by mouth two (2) times a day for 3 days. Qty: 6 Cap, Refills: 0         CONTINUE these medications which have NOT CHANGED    Details   atenolol (TENORMIN) 50 mg tablet TAKE ONE TABLET BY MOUTH TWICE DAILY  Qty: 180 Tab, Refills: 1    Associated Diagnoses: HTN, goal below 130/80      losartan (COZAAR) 100 mg tablet Take 1 Tab by mouth daily. Qty: 90 Tab, Refills: 2    Associated Diagnoses: HTN, goal below 130/80      lovastatin (MEVACOR) 20 mg tablet TAKE ONE TABLET BY MOUTH NIGHTLY  Qty: 90 Tab, Refills: 1    Associated Diagnoses: Dyslipidemia      omega-3 fatty acids-vitamin e 1,000 mg cap Take 2 Caps by mouth. Cholecalciferol, Vitamin D3, (VITAMIN D3) 2,000 unit cap capsule Take 2,000 Units by mouth daily. Qty: 30 Cap, Refills: 5    Associated Diagnoses: Vitamin D deficiency      multivitamin, tx-iron-ca-min (THERA-M W/ IRON) 9 mg iron-400 mcg tab tablet Take 1 Tab by mouth daily. OTHER       aspirin 81 mg chewable tablet Take 81 mg by mouth daily.

## 2017-02-04 NOTE — ED NOTES
TRANSFER - OUT REPORT:    Verbal report given to Kiley Engle RN (name) on Kashif Galeano  being transferred to [facility] 326(unit) for routine progression of care       Report consisted of patients Situation, Background, Assessment and   Recommendations(SBAR). Information from the following report(s) SBAR, Kardex, ED Summary, Procedure Summary, Intake/Output, MAR, Recent Results and Med Rec Status was reviewed with the receiving nurse. Lines:   Peripheral IV 02/03/17 Right Antecubital (Active)   Site Assessment Clean, dry, & intact 2/3/2017 10:25 PM   Phlebitis Assessment 0 2/3/2017 10:25 PM   Infiltration Assessment 0 2/3/2017 10:25 PM   Dressing Status Clean, dry, & intact 2/3/2017 10:25 PM   Dressing Type Transparent 2/3/2017 10:25 PM   Hub Color/Line Status Pink;Patent; Flushed 2/3/2017 10:25 PM   Action Taken Blood drawn 2/3/2017 10:25 PM        Opportunity for questions and clarification was provided.       Patient transported with:   Monitor

## 2017-02-04 NOTE — CONSULTS
Tonio Hayes MD, [address]  Office [phone]    Date of  Admission: 2/3/2017 10:07 PM         IMPRESSION and RECOMMENDATIONS     1.  CP:  Fairly atypical for angina. EKG normal.  Initial troponin minimally abnormal and subsequent normal.  The minimal hypertroponinemia may be due to strain from sig HTN. In any event, I feel she is stable for discharge from a cardiac standpoint. I'll f/u her echo and also schedule an outpt exercise echo stress test.  I've included my contact info in her discharge paperwork. I have discussed this plan with the patient. She appears to understand this plan and wishes to proceed ahead. Problem List  Date Reviewed: 2/4/2017          Codes Class Noted    NSTEMI (non-ST elevated myocardial infarction) [facility]) ICD-10-CM: I21.4  ICD-9-CM: 410.70  2/3/2017        FHx: AAA ICD-10-CM: KGD9811  ICD-9-CM: Blondell Dine  11/9/2015        HTN, goal below 130/80 ICD-10-CM: I10  ICD-9-CM: 401.9  11/9/2015        Dyslipidemia ICD-10-CM: E78.5  ICD-9-CM: 272.4  11/9/2015        Impaired glucose tolerance ICD-10-CM: R73.02  ICD-9-CM: 790.22  11/9/2015        NAFLD (nonalcoholic fatty liver disease) ICD-10-CM: K76.0  ICD-9-CM: 571.8  11/9/2015        Osteopenia ICD-10-CM: M85.80  ICD-9-CM: 733.90  11/9/2015              History of Present Illness:     Bernarda Elizondo is a 78 y.o. female with the above problem list who was admitted for NSTEMI (non-ST elevated myocardial infarction) ([facility] [address] Pt presents with high BP, vague chest discomfort, mild anxiety. Also, had nose bleed. Didn't feel comfortable staying home alone, so she went to her doctor son's house, then ultimately went to [place]. She denies other chest pain/discomfort, shortness of breath, dyspnea on exertion, orthopnea, paroxysmal noctural dyspnea, lower extremity edema, palpitations, syncope, or near-syncope.     Current Facility-Administered Medications   Medication Dose Route Frequency    sodium chloride (NS) flush 5-10 mL  5-10 mL IntraVENous Q8H    sodium chloride (NS) flush 5-10 mL  5-10 mL IntraVENous PRN    aspirin delayed-release tablet 81 mg  81 mg Oral DAILY    HYDROcodone-acetaminophen (NORCO) 5-325 mg per tablet 1 Tab  1 Tab Oral Q4H PRN    enoxaparin (LOVENOX) injection 40 mg  40 mg SubCUTAneous Q24H    atenolol (TENORMIN) tablet 50 mg  50 mg Oral Q12H    losartan (COZAAR) tablet 100 mg  100 mg Oral DAILY    lovastatin (MEVACOR) tablet 20 mg  20 mg Oral DAILY    hydrALAZINE (APRESOLINE) 20 mg/mL injection 20 mg  20 mg IntraVENous Q6H PRN      Allergies   Allergen Reactions    Shellfish Derived Diarrhea and Nausea and Vomiting      Family History   Problem Relation Age of Onset    Hypertension Father     Hypertension Sister       Social History     Social History    Marital status:      Spouse name: N/A    Number of children: N/A    Years of education: N/A     Occupational History    Not on file.      Social History Main Topics    Smoking status: Former Smoker     Years: 15.00     Quit date: 9/1/2012    Smokeless tobacco: Not on file    Alcohol use 0.0 oz/week     0 Standard drinks or equivalent per week    Drug use: No    Sexual activity: Not on file     Other Topics Concern    Not on file     Social History Narrative       Physical Exam:     Patient Vitals for the past 16 hrs:   BP Temp Pulse Resp SpO2 Height Weight   02/04/17 0747 139/68 97.9 °F (36.6 °C) 64 16 96 % - -   02/04/17 0652 - - (!) 58 - - - -   02/04/17 0553 143/75 97.8 °F (36.6 °C) 60 16 97 % - -   02/04/17 0204 - - 62 - - - -   02/04/17 0145 148/79 - - - - - -   02/04/17 0126 179/88 97.9 °F (36.6 °C) 63 16 98 % 5' 3\" (1.6 m) 116 lb 14.4 oz (53 kg)   02/04/17 0027 151/71 97.9 °F (36.6 °C) 61 10 98 % - -   02/04/17 0001 148/78 - 60 8 97 % - -   02/03/17 2332 - - 62 13 98 % - -   02/03/17 2330 157/84 - - - - - -   02/03/17 2300 149/69 - 60 13 98 % - -   02/03/17 2230 173/77 - 62 11 98 % - -   02/03/17 2217 158/78 97.8 °F (36.6 °C) 65 13 98 % 5' 3\" (1.6 m) 120 lb (54.4 kg)   02/03/17 2212 158/78 - 66 17 - - -       HEENT Exam:     Normocephalic, atraumatic. EOMI. Oropharynx negative. Neck supple. No lymphadenopathy. Lung Exam:     The patient is not dyspneic. There is no cough. Breath sounds are heard equally in all lung fields. There are no wheezes, rales, rhonchi, or rubs heard on auscultation. Heart Exam:     The rhythm is regular. The PMI is in the 5th intercostal space of the MCL. Apical impulse is normal. S1 is regular. S2 is physiologic. There is no S3, S4 gallop, murmur, click, or rub. Abdomen Exam:     Bowel sounds are normoactive. Abdomen soft in all quadrants. No tenderness. No palpable masses. No organomegaly. No hernias noted. No bruits or pulsatile mass. Extremities Exam:     The extremities are atraumatic appearing. There is no clubbing, cyanosis, edema, ulcers, varicose veins, rash, erythemia noted in the extremities. The neurovascular status is grossly intact with normal distal sensation and pulses. Vascular Exam:     The radial, brachial, dorsalis pedis, posterior tibial, are equal and strong bilaterally The carotids are equal bilaterally without bruits. Labs:     Lab Results   Component Value Date/Time    Glucose 103 02/03/2017 10:24 PM    Sodium 139 02/03/2017 10:24 PM    Potassium 4.7 02/03/2017 10:24 PM    Chloride 103 02/03/2017 10:24 PM    CO2 28 02/03/2017 10:24 PM    BUN 14 02/03/2017 10:24 PM    Creatinine 0.66 02/03/2017 10:24 PM    Calcium 9.1 02/03/2017 10:24 PM     Recent Labs      02/03/17   2224   WBC  7.4   HGB  13.0   HCT  39.4   PLT  169     Recent Labs      02/03/17 2224   SGOT  95*   ALT  76   AP  79   TBILI  0.8   TP  7.6   ALB  4.2   GLOB  3.4     No results for input(s): INR, PTP, APTT in the last 72 hours.     No lab exists for component: INREXT   No results for input(s): CPK, CKMB, TNIPOC in the last 72 hours.     No lab exists for component: Shellie Hoover  Recent Labs      02/04/17   0603   TROIQ  <0.04     Lab Results   Component Value Date/Time    Cholesterol, total 194 02/04/2017 01:13 AM    HDL Cholesterol 72 02/04/2017 01:13 AM    LDL, calculated 77.8 02/04/2017 01:13 AM    Triglyceride 221 02/04/2017 01:13 AM    CHOL/HDL Ratio 2.7 02/04/2017 01:13 AM       EKG:  NSR @ 65, one PVC

## 2017-02-04 NOTE — DISCHARGE SUMMARY
Physician Discharge Summary     Patient ID:  Anita Hoover  [id number]  78 y.o.  1950    Admit date: 2/3/2017    Discharge date: 2/4/2017    Admission Diagnoses: NSTEMI (non-ST elevated myocardial infarction) [facility]), ruled out    Principal Discharge Diagnoses:    Elevated troponin    OTHER PROBLEMS ADDRESSEDS  Principal Diagnosis <principal problem not specified>                                            Active Problems:    HTN, goal below 130/80 (11/9/2015)      Dyslipidemia (11/9/2015)      Impaired glucose tolerance (11/9/2015)      NSTEMI (non-ST elevated myocardial infarction) ([facility] [address] (2/3/2017), ruled out      Pyuria (2/4/2017)       Patient Active Problem List   Diagnosis Code    FHx: AAA [id number]    HTN, goal below 130/80 I10    Dyslipidemia E78.5    Impaired glucose tolerance R73.02    NAFLD (nonalcoholic fatty liver disease) K76.0    Osteopenia M85.80    NSTEMI (non-ST elevated myocardial infarction) ([facility] [address] I21.4    Pyuria N39.0         Hospital Course:   Ms. Sharon Rondon is a 77 yo F w/ hx of HTN, HLD presenting with atypical chest pain and borderline elevated troponin    NSTEMI ruled out: chest tightness was transient, no active chest pain. I doubt AMI, as troponin was only mildly elevated, likely due to demand ischemia. Seen by cardiology, planning outpatient stress test. Cont ASA, BP control, statin    Murmur: diastolic, TTE pending. Probably AR. Outpatient follow up with cardiology     HTN, goal below 130/80: elevated and not at goal on admission, better now. Just had losartan increased to 100 mg by PCP. Continue home losartan and atenolol, further titration as needed by PCP or cardiology.      Dyslipidemia: LDL 70s, acceptable. Great HDL. . Continue statin, may benefit from omega 3 FA      Impaired glucose tolerance: Checked hgbA1c, 5.2%, no diabetes    Pyuria: no bacteruria but large LE with only trace epis. Possible asymptomatic uncomplicated UTI.  Empiric abx, will follow up on urine culture    Pt discharged in improved and stable condition. Procedures performed: none    Imaging studies: CXR, no acute process        PCP: Shari Dai MD    Consults: Cardiology    Discharge Exam:  Patient Vitals for the past 12 hrs:   Temp Pulse Resp BP SpO2   02/04/17 0747 97.9 °F (36.6 °C) 64 16 139/68 96 %   02/04/17 0652 - (!) 58 - - -   02/04/17 0553 97.8 °F (36.6 °C) 60 16 143/75 97 %   02/04/17 0204 - 62 - - -   02/04/17 0145 - - - 148/79 -   02/04/17 0126 97.9 °F (36.6 °C) 63 16 179/88 98 %   02/04/17 0027 97.9 °F (36.6 °C) 61 10 151/71 98 %   02/04/17 0001 - 60 8 148/78 97 %   02/03/17 2332 - 62 13 - 98 %   02/03/17 2330 - - - 157/84 -   02/03/17 2300 - 60 13 149/69 98 %   02/03/17 2230 - 62 11 173/77 98 %     Gen:  Well-developed, well-nourished, in no acute distress  Resp:  No accessory muscle use, clear breath sounds without wheezes, rales, or rhonchi  Card: Rate 60s, regular rhythm. Normal S1 and S2 with 2/6 diastolic murmur. No rubs or gallops. No LE edema  Abd: soft, non-tender, non-distended. No rebound, no guarding. Disposition: home    Patient Instructions:   Current Discharge Medication List      START taking these medications    Details   cefdinir (OMNICEF) 300 mg capsule Take 1 Cap by mouth two (2) times a day for 3 days. Qty: 6 Cap, Refills: 0         CONTINUE these medications which have NOT CHANGED    Details   atenolol (TENORMIN) 50 mg tablet TAKE ONE TABLET BY MOUTH TWICE DAILY  Qty: 180 Tab, Refills: 1    Associated Diagnoses: HTN, goal below 130/80      losartan (COZAAR) 100 mg tablet Take 1 Tab by mouth daily. Qty: 90 Tab, Refills: 2    Associated Diagnoses: HTN, goal below 130/80      lovastatin (MEVACOR) 20 mg tablet TAKE ONE TABLET BY MOUTH NIGHTLY  Qty: 90 Tab, Refills: 1    Associated Diagnoses: Dyslipidemia      omega-3 fatty acids-vitamin e 1,000 mg cap Take 2 Caps by mouth.       Cholecalciferol, Vitamin D3, (VITAMIN D3) 2,000 unit cap capsule Take 2,000 Units by mouth daily. Qty: 30 Cap, Refills: 5    Associated Diagnoses: Vitamin D deficiency      multivitamin, tx-iron-ca-min (THERA-M W/ IRON) 9 mg iron-400 mcg tab tablet Take 1 Tab by mouth daily. OTHER       aspirin 81 mg chewable tablet Take 81 mg by mouth daily. Activity: See discharge instructions  Diet: See discharge instructions  Wound Care: See discharge instructions    Follow-up Information     Follow up With Details Comments [address], [place] In 1 week  [facility] 116  [address] [place] [facility] [address]      Bruno Felder MD  as instructed, for outpatient stress test [address] [place] [address]  [phone]            I spent 35 minutes on this discharge.     Signed:  Antonieta Valdez MD  2/4/2017  10:10 AM

## 2017-02-05 LAB
BACTERIA SPEC CULT: NORMAL
CC UR VC: NORMAL
SERVICE CMNT-IMP: NORMAL

## 2017-02-06 ENCOUNTER — PATIENT OUTREACH (OUTPATIENT)
Dept: CARDIOLOGY CLINIC | Age: 67
End: 2017-02-06

## 2017-02-06 NOTE — PROGRESS NOTES
Transitional Care Nurse Navigator Note:  Hospital Follow Up for hospital visit to : [address] Admission from 2/3/17 - 2/4/17 for Chest Pain. RRAT score: 26 High    This represents Transitions of Care because NN spoke with patient and/or caregiver within 1 business days of discharge. Pt's TCM follow up appt is scheduled with Dr. Rachelle Quick on Monday 2/20/17 @ 1pm.    Called and spoke to Ms. Pathak. Patient has no complaints since being discharged home and denies any more episodes of chest pain. Patient lives alone and is independent in her ADL's. On Tuesdays and Thursdays the patient watches her 11year old granddaughter- patient was very excited tomorrow is Tuesday. She has a BP machine at home and does not check her BP unless she is feeling bad. Encouraged patient to check it more than just when she is feeling bad. She has not made her PCP f/u apt as of yet- her son is Dr. Marcy Hernandez and she wanted to talk to him before she made the appointment. She has Stress Test schedule on 2/20 with Dr. Rachelle Quick. Gave patient my contact information for any assistance. Medical History:     Past Medical History   Diagnosis Date    HTN, goal below 130/80 11/9/2015    Impaired glucose tolerance 11/9/2015    Osteopenia 11/9/2015     Patient presenting symptoms per Dr. Howard Waterman notes 2/3/17:  Assessment/Plan:    Active Problems:  NSTEMI (non-ST elevated myocardial infarction) / Chest tightness: ECG without ST elevation but is unusual (see interpretation below). ACS v. Stress from increased afterload. Admit to telemetry. Cardiology consult. Loaded with ASA in ED, continue daily. Serial Sushma and ECG, if Troponin continues to rise, will anticoagulate with LMWH. Check TTE. NPO at MN. Risk stratification with hgbA1c and lipid panel. Continue BB, ARB, statin (though should be on higher potency I suspect). HTN, goal below 130/80: elevated and not at goal. Just had losartan increased to 100 mg by PCP.  Continue home losartan and atenolol for now. PRN Hydralazine. Suspect that she may need additional agent v. uptitrate of atenolol. Dyslipidemia: check lipid panel as she is on lower potency. Continue statin for now  Impaired glucose tolerance: Check hgbA1c    Diagnosed with Chest Pain. Admitted to Hospitalist Service with consults from Cardiology     Course of current Hospitalization (referenced by Dr. Rachelle Quick' note 2/4/17): IMPRESSION and RECOMMENDATIONS      1. CP: Fairly atypical for angina. EKG normal. Initial troponin minimally abnormal and subsequent normal. The minimal hypertroponinemia may be due to strain from sig HTN. In any event, I feel she is stable for discharge from a cardiac standpoint. I'll f/u her echo and also schedule an outpt exercise echo stress test. I've included my contact info in her discharge paperwork. I have discussed this plan with the patient. She appears to understand this plan and wishes to proceed ahead. Significant Lab/Diagnostic Findings:   Lab Results  Component Value Date/Time   WBC 7.4 02/03/2017 10:24 PM   HGB 13.0 02/03/2017 10:24 PM   HCT 39.4 02/03/2017 10:24 PM   PLATELET 660 51/92/5530 10:24 PM   .3 02/03/2017 10:24 PM     Lab Results  Component Value Date/Time   Hemoglobin A1c 5.2 02/03/2017 10:24 PM   Hemoglobin A1c 5.3 01/09/2017 09:24 AM   Hemoglobin A1c 5.4 05/11/2016 09:16 AM   Glucose 103 02/03/2017 10:24 PM   LDL, calculated 77.8 02/04/2017 01:13 AM   Creatinine 0.66 02/03/2017 10:24 PM      Lab Results  Component Value Date/Time   Cholesterol, total 194 02/04/2017 01:13 AM   HDL Cholesterol 72 02/04/2017 01:13 AM   LDL, calculated 77.8 02/04/2017 01:13 AM   Triglyceride 221 02/04/2017 01:13 AM   CHOL/HDL Ratio 2.7 02/04/2017 01:13 AM     Lab Results  Component Value Date/Time   ALT (SGPT) 76 02/03/2017 10:24 PM   AST (SGOT) 95 02/03/2017 10:24 PM   Alk.  phosphatase 79 02/03/2017 10:24 PM   Bilirubin, total 0.8 02/03/2017 10:24 PM     Lab Results  Component Value Date/Time   GFR est AA >60 02/03/2017 10:24 PM   GFR est non-AA >60 02/03/2017 10:24 PM   Creatinine 0.66 02/03/2017 10:24 PM   BUN 14 02/03/2017 10:24 PM   Sodium 139 02/03/2017 10:24 PM   Potassium 4.7 02/03/2017 10:24 PM   Chloride 103 02/03/2017 10:24 PM   CO2 28 02/03/2017 10:24 PM      Lab Results  Component Value Date/Time   TSH 2.100 01/09/2017 09:24 AM      Advance Medical Directive on file in EMR? no     Total Hospitalizations/ED visits last 12 months? [facility] orders at discharge? no     Called patient on 2/6/17 and verified with 2 identifiers. Medication Reconciliation completed: yes New medications at discharge include [address] consists of: kiki Kimbrough MD on call at [phone] 24/7.    Follow up appt with cardiology on 2/20/17   Instructed patient to schedule f/u apt with PCP

## 2017-02-07 ENCOUNTER — PATIENT OUTREACH (OUTPATIENT)
Dept: INTERNAL MEDICINE CLINIC | Age: 67
End: 2017-02-07

## 2017-02-07 LAB
ATRIAL RATE: 61 BPM
CALCULATED P AXIS, ECG09: 42 DEGREES
CALCULATED R AXIS, ECG10: -5 DEGREES
CALCULATED T AXIS, ECG11: 69 DEGREES
DIAGNOSIS, 93000: NORMAL
P-R INTERVAL, ECG05: 150 MS
Q-T INTERVAL, ECG07: 464 MS
QRS DURATION, ECG06: 88 MS
QTC CALCULATION (BEZET), ECG08: 467 MS
VENTRICULAR RATE, ECG03: 61 BPM

## 2017-02-07 NOTE — PROGRESS NOTES
NNTOCIP call. Patient discharged on 2/4/17 from [facility]. Admission dates: 2/3/17 - 2/4/17. RICHAR call completed by cardio NN, see note. Call placed to pt, ID X 2. Pt stated that she is doing well, confirmed f/u appt with PCP Dr. Deandre Ignacio 2/15/17. Contact info provided. Hospital Course:   Ms. Harmeet Deluca is a 77 yo F w/ hx of HTN, HLD presenting with atypical chest pain and borderline elevated troponin  NSTEMI ruled out: chest tightness was transient, no active chest pain. I doubt AMI, as troponin was only mildly elevated, likely due to demand ischemia. Seen by cardiology, planning outpatient stress test. Cont ASA, BP control, statin  Murmur: diastolic, TTE pending. Probably AR. Outpatient follow up with cardiology  HTN, goal below 130/80: elevated and not at goal on admission, better now. Just had losartan increased to 100 mg by PCP. Continue home losartan and atenolol, further titration as needed by PCP or cardiology.    Dyslipidemia: LDL 70s, acceptable. Great HDL. . Continue statin, may benefit from omega 3 FA     Impaired glucose tolerance: Checked hgbA1c, 5.2%, no diabetes  Pyuria: no bacteruria but large LE with only trace epis. Possible asymptomatic uncomplicated UTI. Empiric abx, will follow up on urine culture  Pt discharged in improved and stable condition. Procedures performed: none  Imaging studies: CXR, no acute process      Future Appointments      Provider Alfred Darling   2/15/2017 11:15 AM John Robert MD [facility] [place] [id number]   2/20/2017 1:00 PM [facility] [place] [id number] [facility]   2/20/2017 1:00 PM [place], [address] [facility] OF [address]       This note will not be viewable in [address]

## 2017-02-15 ENCOUNTER — OFFICE VISIT (OUTPATIENT)
Dept: INTERNAL MEDICINE CLINIC | Age: 67
End: 2017-02-15

## 2017-02-15 VITALS
OXYGEN SATURATION: 98 % | WEIGHT: 115.6 LBS | SYSTOLIC BLOOD PRESSURE: 162 MMHG | TEMPERATURE: 98.1 F | DIASTOLIC BLOOD PRESSURE: 89 MMHG | RESPIRATION RATE: 16 BRPM | HEART RATE: 68 BPM | HEIGHT: 63 IN | BODY MASS INDEX: 20.48 KG/M2

## 2017-02-15 DIAGNOSIS — I10 HTN, GOAL BELOW 130/80: Primary | ICD-10-CM

## 2017-02-15 DIAGNOSIS — R77.8 ELEVATED TROPONIN: ICD-10-CM

## 2017-02-15 DIAGNOSIS — R73.02 IMPAIRED GLUCOSE TOLERANCE: ICD-10-CM

## 2017-02-15 DIAGNOSIS — E78.5 DYSLIPIDEMIA: ICD-10-CM

## 2017-02-15 RX ORDER — VALSARTAN 160 MG/1
160 TABLET ORAL DAILY
Qty: 30 TAB | Refills: 5 | Status: SHIPPED | OUTPATIENT
Start: 2017-02-15 | End: 2017-03-20 | Stop reason: DRUGHIGH

## 2017-02-15 NOTE — PROGRESS NOTES
HISTORY OF PRESENT ILLNESS  Jordi Fuentes is a 78 y.o. female. HPI  Transition of care visit for Maya Gutierres, who was hospitalized February 3rd through 4th with atypical chest pain, fatigue and mildly elevated troponin. She was ruled out for an MI. She has been scheduled for an outpatient stress echo on February 20th. She's had no chest pain and energy is generally better. Her chest xray was clear. Her urine culture showed only mixed normal [place]. She is done with DAVEY HUTCHINSON and never had UTI symptoms. Her A1c was 5.2%. Since being home she generally feels okay, however notes that her pressure, which she tracks regularly in the mornings, is often over 234 up to 097 systolic and diastolics upper 76U to 82N. This is on Losartan 100 a day and Atenolol b.i.d. She's not having edema. She's not exercising much. She is trying to watch her salt. Review of Systems   Constitutional: Negative for chills, fever and weight loss. Respiratory: Negative for cough, shortness of breath and wheezing. Cardiovascular: Negative for chest pain, palpitations, orthopnea, leg swelling and PND. Gastrointestinal: Negative for abdominal pain, diarrhea, heartburn, nausea and vomiting. Musculoskeletal: Negative for myalgias. Neurological: Negative for dizziness and headaches. Physical Exam   Constitutional: She is oriented to person, place, and time. She appears well-developed and well-nourished. HENT:   Head: Normocephalic and atraumatic. Neck: Normal range of motion. Neck supple. Carotid bruit is not present. No thyromegaly present. Cardiovascular: Normal rate, regular rhythm, S1 normal, S2 normal, normal heart sounds and intact distal pulses. No murmur heard. Pulmonary/Chest: Effort normal and breath sounds normal. No respiratory distress. She has no wheezes. She has no rales. Musculoskeletal: She exhibits no edema. Neurological: She is alert and oriented to person, place, and time. Psychiatric: She has a normal mood and affect. Her behavior is normal.   Nursing note and vitals reviewed. ASSESSMENT and PLAN  Lennox  was seen today for hypertension.     Diagnoses and all orders for this visit:    HTN, goal below 130/80-change losartan 100 to diovan 160 and appt in 1mo but call in 2 weeks with bp readings    Elevated troponin-feels ok-stress echo set fro     Dyslipidemia    Impaired glucose tolerance  Recent hgba1c low 5.2%

## 2017-02-20 ENCOUNTER — CLINICAL SUPPORT (OUTPATIENT)
Dept: CARDIOLOGY CLINIC | Age: 67
End: 2017-02-20

## 2017-02-20 DIAGNOSIS — E78.5 DYSLIPIDEMIA: ICD-10-CM

## 2017-02-20 DIAGNOSIS — R07.9 CHEST PAIN, UNSPECIFIED TYPE: Primary | ICD-10-CM

## 2017-03-20 ENCOUNTER — OFFICE VISIT (OUTPATIENT)
Dept: INTERNAL MEDICINE CLINIC | Age: 67
End: 2017-03-20

## 2017-03-20 VITALS
RESPIRATION RATE: 16 BRPM | HEART RATE: 65 BPM | SYSTOLIC BLOOD PRESSURE: 140 MMHG | BODY MASS INDEX: 20.02 KG/M2 | TEMPERATURE: 98.7 F | DIASTOLIC BLOOD PRESSURE: 82 MMHG | WEIGHT: 113 LBS | OXYGEN SATURATION: 98 % | HEIGHT: 63 IN

## 2017-03-20 DIAGNOSIS — I10 HTN, GOAL BELOW 140/80: Primary | ICD-10-CM

## 2017-03-20 RX ORDER — VALSARTAN 320 MG/1
320 TABLET ORAL DAILY
Qty: 30 TAB | Refills: 5 | Status: SHIPPED | OUTPATIENT
Start: 2017-03-20 | End: 2017-09-18 | Stop reason: SDUPTHER

## 2017-03-20 NOTE — PROGRESS NOTES
HISTORY OF PRESENT ILLNESS  Jahaira Morgan is a 78 y.o. female. TAD Ga has now been on the 160 of Valsartan along with Atenolol for the last month. Her blood pressure is varying from 351-204/71-15 diastolic. She feels well. She is not walking much. She denies headaches or dizzy spells. She had a negative stress echo. Review of Systems   Constitutional: Negative for chills, fever and weight loss. Respiratory: Negative for cough, shortness of breath and wheezing. Cardiovascular: Negative for chest pain, palpitations, orthopnea, leg swelling and PND. Gastrointestinal: Negative for heartburn and nausea. Musculoskeletal: Negative for myalgias. Neurological: Negative for dizziness and headaches. Physical Exam   Constitutional: She is oriented to person, place, and time. She appears well-developed and well-nourished. HENT:   Head: Normocephalic and atraumatic. Neck: Normal range of motion. Neck supple. Carotid bruit is not present. No thyromegaly present. Cardiovascular: Normal rate, regular rhythm, S1 normal, S2 normal, normal heart sounds and intact distal pulses. No murmur heard. Pulmonary/Chest: Effort normal and breath sounds normal. No respiratory distress. She has no wheezes. She has no rales. Musculoskeletal: She exhibits no edema. Neurological: She is alert and oriented to person, place, and time. Psychiatric: She has a normal mood and affect. Her behavior is normal.   Nursing note and vitals reviewed. ASSESSMENT and PLAN  Michelle Watson was seen today for medication evaluation. Diagnoses and all orders for this visit:    HTN, goal below 140/80-increasing exercise and inc dose from 160 to 320 mg  -     valsartan (DIOVAN) 320 mg tablet; Take 1 Tab by mouth daily.

## 2017-06-16 ENCOUNTER — OFFICE VISIT (OUTPATIENT)
Dept: INTERNAL MEDICINE CLINIC | Age: 67
End: 2017-06-16

## 2017-06-16 VITALS
HEIGHT: 63 IN | RESPIRATION RATE: 16 BRPM | BODY MASS INDEX: 21.4 KG/M2 | WEIGHT: 120.8 LBS | DIASTOLIC BLOOD PRESSURE: 81 MMHG | SYSTOLIC BLOOD PRESSURE: 148 MMHG | HEART RATE: 64 BPM | OXYGEN SATURATION: 98 % | TEMPERATURE: 97.6 F

## 2017-06-16 DIAGNOSIS — I10 HTN, GOAL BELOW 130/80: ICD-10-CM

## 2017-06-16 DIAGNOSIS — E78.5 DYSLIPIDEMIA: ICD-10-CM

## 2017-06-16 DIAGNOSIS — Z13.39 SCREENING FOR ALCOHOLISM: ICD-10-CM

## 2017-06-16 DIAGNOSIS — Z00.00 MEDICARE ANNUAL WELLNESS VISIT, SUBSEQUENT: Primary | ICD-10-CM

## 2017-06-16 DIAGNOSIS — Z00.00 ROUTINE GENERAL MEDICAL EXAMINATION AT A HEALTH CARE FACILITY: ICD-10-CM

## 2017-06-16 DIAGNOSIS — Z12.39 BREAST SCREENING: ICD-10-CM

## 2017-06-16 NOTE — PROGRESS NOTES
Chief Complaint   Patient presents with    Blood Pressure Check       Patient's BP is high, MD made aware.

## 2017-06-16 NOTE — PROGRESS NOTES
This is an Initial Medicare Annual Wellness Exam (AWV) (Performed 12 months after IPPE or effective date of Medicare Part B enrollment, Once in a lifetime)    I have reviewed the patient's medical history in detail and updated the computerized patient record. History     Past Medical History:   Diagnosis Date    HTN, goal below 130/80 11/9/2015    Impaired glucose tolerance 11/9/2015    Osteopenia 11/9/2015      Past Surgical History:   Procedure Laterality Date    HX APPENDECTOMY  1963    HX COLONOSCOPY  6/11    HX GYN      c section    HX ORTHOPAEDIC  1/29/13    right foot     Current Outpatient Prescriptions   Medication Sig Dispense Refill    valsartan (DIOVAN) 320 mg tablet Take 1 Tab by mouth daily. 30 Tab 5    atenolol (TENORMIN) 50 mg tablet TAKE ONE TABLET BY MOUTH TWICE DAILY 180 Tab 1    lovastatin (MEVACOR) 20 mg tablet TAKE ONE TABLET BY MOUTH NIGHTLY 90 Tab 1    omega-3 fatty acids-vitamin e 1,000 mg cap Take 2 Caps by mouth.  Cholecalciferol, Vitamin D3, (VITAMIN D3) 2,000 unit cap capsule Take 2,000 Units by mouth daily. 30 Cap 5    multivitamin, tx-iron-ca-min (THERA-M W/ IRON) 9 mg iron-400 mcg tab tablet Take 1 Tab by mouth daily.  OTHER       aspirin 81 mg chewable tablet Take 81 mg by mouth daily.        Allergies   Allergen Reactions    Shellfish Derived Diarrhea and Nausea and Vomiting     Family History   Problem Relation Age of Onset    Hypertension Father     Hypertension Sister      Social History   Substance Use Topics    Smoking status: Former Smoker     Years: 15.00     Quit date: 9/1/2012    Smokeless tobacco: Not on file    Alcohol use 0.0 oz/week     0 Standard drinks or equivalent per week     Patient Active Problem List   Diagnosis Code    FHx: AAA [id number]    HTN, goal below 130/80 I10    Dyslipidemia E78.5    Impaired glucose tolerance R73.02    NAFLD (nonalcoholic fatty liver disease) K76.0    Osteopenia M85.80    Pyuria N39.0 Depression Risk Factor Screening:     PHQ over the last two weeks 6/16/2017   Little interest or pleasure in doing things Not at all   Feeling down, depressed or hopeless Not at all   Total Score PHQ 2 0     Alcohol Risk Factor Screening: On any occasion during the past 3 months, have you had more than 3 drinks containing alcohol? No    Do you average more than 7 drinks per week? No    Functional Ability and Level of Safety:     Hearing Loss   normal-to-mild    Activities of Daily Living   Self-care. Requires assistance with: no ADLs    Fall Risk     Fall Risk Assessment, last 12 mths 6/16/2017   Able to walk? Yes   Fall in past 12 months? No     Abuse Screen   Patient is not abused    Review of Systems   Not required    Physical Examination     No exam data present    Evaluation of Cognitive Function:  Mood/affect:  neutral  Appearance: age appropriate  Family member/caregiver input: none present    No exam performed today, MWV today. Patient Care Team:  Jessie Camacho MD as PCP - General (Internal Medicine)  Bernie Fabry, RN as Ambulatory Care Navigator    Advice/Referrals/Counseling   Education and counseling provided:  Pneumococcal Vaccine  Influenza Vaccine  Screening Mammography  Colorectal cancer screening tests  Bone mass measurement (DEXA)  Screening for glaucoma  Advanced Medical Directive      Assessment/Plan       ICD-10-CM ICD-9-CM    1. HTN, goal below 130/80 I10 401.9    2. Dyslipidemia E78.5 272.4    3. Breast screening Z12.39 V76.10 ROYCE 3D GENARO W MAMMO BI SCREENING INCL CAD   . 1. A copy of patient's completed Advanced Medical Directive is on file in the patient's medical record. NN reviewed Advanced Medical Directive document with patient & patient denies the need for changes/ updates.      2. Patient is up to date on the following immunizations:   Immunization History   Administered Date(s) Administered    Pneumococcal Conjugate (PCV-13) 11/09/2015    Pneumococcal Polysaccharide (PPSV-23) 06/09/2011    Tdap 06/09/2011    Zoster Vaccine, Live 06/17/2013     3. Patient wears corrective lenses. Patient needs her annual routine eye exam and glaucoma screening completed. Patient verbalized understanding of information presented and was given and opportunity to ask questions. Patient provided AVS, either a printed version or electronic version in [employer], which includes Medicare Wellness Preventative Screening Table. Please bring a copy of your completed advance medical directive to the office so it may be added to your medical record. Thank you. Medication reconciliation completed by MA/ LPN and reviewed by PCP. 4. Please see depression screening and fall risk assessment section for additional information.

## 2017-06-16 NOTE — PATIENT INSTRUCTIONS
Today's Date -  6/16/17  Medicare Part B Preventive Services Limitations Recommendation/Date completed if known Scheduled/ Next Due   Bone Mass Measurement  (age 72 & older, biennial) Requires diagnosis related to osteoporosis or estrogen deficiency. Biennial benefit unless patient has history of long-term glucocorticoid tx or baseline is needed because initial test was by other method Completed:    6/2016  Recommended every 2 years As recommended by your specialist or PCP   Cardiovascular Screening Blood Tests (every 5 years)  Total cholesterol, HDL, Triglycerides Order as a panel if possible Completed:    2/2017  Recommended annually As recommended by your PCP. Colorectal Cancer Screening  -Fecal occult blood test (annual)  -Flexible sigmoidoscopy (5y)  -Screening colonoscopy (10y)  -Barium Enema  Completed:    2011-per pt every 10 yers  Recommended  Every 5to 10 years. As recommended by your PCP/Specialist   Counseling to Prevent Tobacco Use (up to 8 sessions per year)  - Counseling greater than 3 and up to 10 minutes  - Counseling greater than 10 minutes Patients must be asymptomatic of tobacco-related conditions to receive as preventive service n/a n/a   Prevnar 13 vaccine    2015 complete     TDAP Vaccine    2011 complete   Shingles Vaccine    2013     complete   Influenza Vaecine       2016 Due Every Fall   Pneumonia Vaccine      2011 complete   Diabetes Screening Tests (at least every 3 years, Medicare covers annually or at 6-month intervals for prediabetic patients)    Fasting blood sugar (FBS) or glucose tolerance test (GTT)       Patient must be diagnosed with one of the following:  -Hypertension, Dyslipidemia, obesity, previous impaired FBS or GTT  Or any two of the following: overweight, FH of diabetes, age ? 72, history of gestational diabetes, birth of baby weighing more than 9 pounds Completed:    2/2017    Recommended every 3 years for non-diabetics.     Recommended every 3-6 months for Pre-Diabetics and Diabetes. DUE: Per PCP   Diabetes Self-Management Training (DSMT) (no USPSTF recommendation) Requires referral by treating physician for patient with diabetes or renal disease. 10 hours of initial DSMT session of no less than 30 minutes each in a continuous 12-month period. 2 hours of follow-up DSMT in subsequent years. n/a n/a   Glaucoma Screening (no USPSTF recommendation) Diabetes mellitus, family history, , age 48 or over,  American, age 72 or over Completed:  2015-needs to schedule      Recommended annually DUE: annually   Medical Nutrition Therapy (MNT) (fordiabetes or renal disease not recommended schedule) Requires referral by treating physician for patient with diabetes or renal disease. Can be provided in same year as diabetes self-management training (DSMT), and CMS recommends medical nutrition therapy take place after DSMT. Up to 3 hours for initial year and 2 hours in subsequent years. n/a n/a   Hepatitis B Vaccinations (if medium/high risk) Medium/high risk factors:  End-stage renal disease,  Hemophiliacs who received Factor VIII or IX concentrates, Clients of institutions for the mentally retarded, Persons who live in the same house as a HepB virus carrier, Homosexual men, Illicit injectable drug abusers. n/a n/a   Screening Mammography (biennial age 54-69)? Annually (age 36 or over) Completed: Will receive order today    Recommended annually over the age of 36. As recommended by your PCP/Specialist   Screening Pap Tests and Pelvic Examination (up to age 79 and after 79 if unknown history or abnormal study last 10 years) Every 24 months except high risk Completed:    Every 3 years-per pt    Recommended every 2 years As recommended by your PCP/Specialist   Thanks for coming in today. It was nice to spend some time with you. If you have any questions about your visit today, please call [phone] and ask to speak with [place]

## 2017-06-20 DIAGNOSIS — Z12.31 ENCOUNTER FOR SCREENING MAMMOGRAM FOR BREAST CANCER: Primary | ICD-10-CM

## 2017-07-10 ENCOUNTER — HOSPITAL ENCOUNTER (OUTPATIENT)
Dept: MAMMOGRAPHY | Age: 67
Discharge: HOME | End: 2017-07-10
Attending: INTERNAL MEDICINE
Payer: MEDICARE

## 2017-07-10 DIAGNOSIS — Z12.31 ENCOUNTER FOR SCREENING MAMMOGRAM FOR BREAST CANCER: ICD-10-CM

## 2017-07-10 PROCEDURE — 77063 BREAST TOMOSYNTHESIS BI: CPT

## 2017-07-25 DIAGNOSIS — E78.5 DYSLIPIDEMIA: ICD-10-CM

## 2017-07-25 DIAGNOSIS — I10 HTN, GOAL BELOW 130/80: ICD-10-CM

## 2017-07-25 RX ORDER — ATENOLOL 50 MG/1
TABLET ORAL
Qty: 180 TAB | Refills: 1 | Status: SHIPPED | OUTPATIENT
Start: 2017-07-25 | End: 2017-10-06 | Stop reason: SDUPTHER

## 2017-07-25 RX ORDER — LOVASTATIN 20 MG/1
TABLET ORAL
Qty: 90 TAB | Refills: 1 | Status: SHIPPED | OUTPATIENT
Start: 2017-07-25 | End: 2018-02-02 | Stop reason: SDUPTHER

## 2017-08-17 ENCOUNTER — HOSPITAL ENCOUNTER (OUTPATIENT)
Dept: LAB | Age: 67
Discharge: HOME | End: 2017-08-17
Payer: MEDICARE

## 2017-08-17 DIAGNOSIS — R31.9 HEMATURIA: Primary | ICD-10-CM

## 2017-08-17 PROCEDURE — 81001 URINALYSIS AUTO W/SCOPE: CPT

## 2017-08-17 PROCEDURE — 87086 URINE CULTURE/COLONY COUNT: CPT

## 2017-08-17 RX ORDER — NITROFURANTOIN 25; 75 MG/1; MG/1
100 CAPSULE ORAL 2 TIMES DAILY
Qty: 14 CAP | Refills: 0 | Status: SHIPPED | OUTPATIENT
Start: 2017-08-17 | End: 2017-10-06 | Stop reason: ALTCHOICE

## 2017-08-18 LAB
APPEARANCE UR: CLEAR
BACTERIA #/AREA URNS HPF: NORMAL /[HPF]
BACTERIA UR CULT: NORMAL
BILIRUB UR QL STRIP: NEGATIVE
CASTS URNS QL MICRO: NORMAL /LPF
COLOR UR: YELLOW
EPI CELLS #/AREA URNS HPF: NORMAL /HPF
GLUCOSE UR QL: NEGATIVE
HGB UR QL STRIP: NEGATIVE
KETONES UR QL STRIP: NEGATIVE
LEUKOCYTE ESTERASE UR QL STRIP: ABNORMAL
MICRO URNS: ABNORMAL
NITRITE UR QL STRIP: NEGATIVE
PH UR STRIP: 7.5 [PH] (ref 5–7.5)
PROT UR QL STRIP: NEGATIVE
RBC #/AREA URNS HPF: NORMAL /HPF
SP GR UR: 1.01 (ref 1–1.03)
UROBILINOGEN UR STRIP-MCNC: 0.2 MG/DL (ref 0.2–1)
WBC #/AREA URNS HPF: NORMAL /HPF

## 2017-09-18 DIAGNOSIS — I10 HTN, GOAL BELOW 140/80: ICD-10-CM

## 2017-09-18 RX ORDER — VALSARTAN 320 MG/1
TABLET ORAL
Qty: 30 TAB | Refills: 5 | Status: SHIPPED | OUTPATIENT
Start: 2017-09-18 | End: 2017-10-06 | Stop reason: SDUPTHER

## 2017-10-06 ENCOUNTER — HOSPITAL ENCOUNTER (OUTPATIENT)
Dept: LAB | Age: 67
Discharge: HOME | End: 2017-10-06
Payer: MEDICARE

## 2017-10-06 ENCOUNTER — OFFICE VISIT (OUTPATIENT)
Dept: INTERNAL MEDICINE CLINIC | Age: 67
End: 2017-10-06

## 2017-10-06 VITALS
HEIGHT: 63 IN | BODY MASS INDEX: 21.58 KG/M2 | HEART RATE: 52 BPM | RESPIRATION RATE: 16 BRPM | WEIGHT: 121.8 LBS | SYSTOLIC BLOOD PRESSURE: 151 MMHG | OXYGEN SATURATION: 98 % | DIASTOLIC BLOOD PRESSURE: 73 MMHG | TEMPERATURE: 97.4 F

## 2017-10-06 DIAGNOSIS — I10 HTN, GOAL BELOW 130/80: Primary | ICD-10-CM

## 2017-10-06 DIAGNOSIS — Z23 ENCOUNTER FOR IMMUNIZATION: ICD-10-CM

## 2017-10-06 DIAGNOSIS — E78.5 DYSLIPIDEMIA: ICD-10-CM

## 2017-10-06 DIAGNOSIS — R73.02 IMPAIRED GLUCOSE TOLERANCE: ICD-10-CM

## 2017-10-06 DIAGNOSIS — I49.49 ECTOPIC BEAT: ICD-10-CM

## 2017-10-06 PROCEDURE — 83036 HEMOGLOBIN GLYCOSYLATED A1C: CPT

## 2017-10-06 PROCEDURE — 36415 COLL VENOUS BLD VENIPUNCTURE: CPT

## 2017-10-06 PROCEDURE — 80061 LIPID PANEL: CPT

## 2017-10-06 PROCEDURE — 84439 ASSAY OF FREE THYROXINE: CPT

## 2017-10-06 PROCEDURE — 84443 ASSAY THYROID STIM HORMONE: CPT

## 2017-10-06 PROCEDURE — 80053 COMPREHEN METABOLIC PANEL: CPT

## 2017-10-06 RX ORDER — VALSARTAN 320 MG/1
TABLET ORAL
Qty: 90 TAB | Refills: 3 | Status: SHIPPED | OUTPATIENT
Start: 2017-10-06 | End: 2018-08-08 | Stop reason: RX

## 2017-10-06 RX ORDER — HYDROCHLOROTHIAZIDE 12.5 MG/1
12.5 TABLET ORAL DAILY
Qty: 90 TAB | Refills: 3 | Status: SHIPPED | OUTPATIENT
Start: 2017-10-06 | End: 2018-09-17 | Stop reason: SDUPTHER

## 2017-10-06 RX ORDER — ATENOLOL 50 MG/1
TABLET ORAL
Qty: 180 TAB | Refills: 1 | Status: SHIPPED | OUTPATIENT
Start: 2017-10-06 | End: 2018-04-27 | Stop reason: SDUPTHER

## 2017-10-06 NOTE — PROGRESS NOTES
HISTORY OF PRESENT ILLNESS  Brandon Maldonado is a 78 y.o. female. HPI  Follow up. Overall feeling well. On Diovan 320 and Atenolol 50 mg b.i.d. Takes excellent records on her blood pressure, which is ranging 110s-150s, often in the 140-150 range in the morning. Diastolics are generally 70W-65E and heart rate is mostly in the 60s with an isolated reading of 48. She feels well. She started walking 25 minutes daily a few weeks ago. Denies chest pain or dyspnea on exertion. Denies significant palpitations. Energy is good. She eats very little dairy and we discussed yogurt and dark green leafy vegetables to help with calcium. She's tolerating the Lovastatin 20 mg. Review of Systems   Constitutional: Negative for chills, fever, malaise/fatigue and weight loss. Respiratory: Negative for cough, shortness of breath and wheezing. Cardiovascular: Negative for chest pain, palpitations, orthopnea, leg swelling and PND. Gastrointestinal: Negative for abdominal pain, heartburn, nausea and vomiting. Musculoskeletal: Negative for myalgias. Neurological: Negative for dizziness and headaches. Physical Exam   Constitutional: She is oriented to person, place, and time. She appears well-developed and well-nourished. HENT:   Head: Normocephalic and atraumatic. Neck: Normal range of motion. Neck supple. Carotid bruit is not present. No thyromegaly present. Cardiovascular: Normal rate, regular rhythm, S1 normal, S2 normal, normal heart sounds and intact distal pulses. Occasional extrasystoles are present. No murmur heard. Pulmonary/Chest: Effort normal and breath sounds normal. No respiratory distress. She has no wheezes. She has no rales. Musculoskeletal: She exhibits no edema. Neurological: She is alert and oriented to person, place, and time. Skin: No rash noted. Psychiatric: She has a normal mood and affect.  Her behavior is normal.   Nursing note and vitals reviewed. ASSESSMENT and PLAN  Diagnoses and all orders for this visit:    1. HTN, goal below 130/80  -     hydroCHLOROthiazide (HYDRODIURIL) 12.5 mg tablet; Take 1 Tab by mouth daily. -     valsartan (DIOVAN) 320 mg tablet; TAKE 1 TABLET BY MOUTH EVERY DAY  -     atenolol (TENORMIN) 50 mg tablet; TAKE 1 TABLET BY MOUTH TWO TIMES DAILY    2. Ectopic beat  -     AMB POC EKG ROUTINE W/ 12 LEADS, INTER & REP-looks fine    3. Dyslipidemia  -     METABOLIC PANEL, COMPREHENSIVE  -     LIPID PANEL  -     TSH 3RD GENERATION  -     T4, FREE    4. Impaired glucose tolerance  -     HEMOGLOBIN A1C WITH EAG    5.  Encounter for immunization  -     ADMIN INFLUENZA VIRUS VAC  -     INFLUENZA VIRUS VACCINE, HIGH DOSE SEASONAL, PRESERVATIVE FREE      the following changes in treatment are made: add low dose hctz in am to get systolic bp down from 204 range in am  Consider dec in atenolol in future with walking and use of hctz

## 2017-10-07 LAB
ALBUMIN SERPL-MCNC: 4.5 G/DL (ref 3.6–4.8)
ALBUMIN/GLOB SERPL: 2 {RATIO} (ref 1.2–2.2)
ALP SERPL-CCNC: 79 IU/L (ref 39–117)
ALT SERPL-CCNC: 67 IU/L (ref 0–32)
AST SERPL-CCNC: 97 IU/L (ref 0–40)
BILIRUB SERPL-MCNC: 0.6 MG/DL (ref 0–1.2)
BUN SERPL-MCNC: 14 MG/DL (ref 8–27)
BUN/CREAT SERPL: 19 (ref 12–28)
CALCIUM SERPL-MCNC: 10 MG/DL (ref 8.7–10.3)
CHLORIDE SERPL-SCNC: 101 MMOL/L (ref 96–106)
CHOLEST SERPL-MCNC: 199 MG/DL (ref 100–199)
CO2 SERPL-SCNC: 28 MMOL/L (ref 18–29)
CREAT SERPL-MCNC: 0.74 MG/DL (ref 0.57–1)
EST. AVERAGE GLUCOSE BLD GHB EST-MCNC: 105 MG/DL
GLOBULIN SER CALC-MCNC: 2.2 G/DL (ref 1.5–4.5)
GLUCOSE SERPL-MCNC: 95 MG/DL (ref 65–99)
HBA1C MFR BLD: 5.3 % (ref 4.8–5.6)
HDLC SERPL-MCNC: 66 MG/DL
INTERPRETATION, 910389: NORMAL
LDLC SERPL CALC-MCNC: 92 MG/DL (ref 0–99)
POTASSIUM SERPL-SCNC: 4.4 MMOL/L (ref 3.5–5.2)
PROT SERPL-MCNC: 6.7 G/DL (ref 6–8.5)
SODIUM SERPL-SCNC: 143 MMOL/L (ref 134–144)
T4 FREE SERPL-MCNC: 0.99 NG/DL (ref 0.82–1.77)
TRIGL SERPL-MCNC: 205 MG/DL (ref 0–149)
TSH SERPL DL<=0.005 MIU/L-ACNC: 1.78 UIU/ML (ref 0.45–4.5)
VLDLC SERPL CALC-MCNC: 41 MG/DL (ref 5–40)

## 2018-02-02 DIAGNOSIS — E78.5 DYSLIPIDEMIA: ICD-10-CM

## 2018-02-02 RX ORDER — LOVASTATIN 20 MG/1
TABLET ORAL
Qty: 90 TAB | Refills: 1 | Status: SHIPPED | OUTPATIENT
Start: 2018-02-02 | End: 2018-07-21 | Stop reason: SDUPTHER

## 2018-04-27 ENCOUNTER — HOSPITAL ENCOUNTER (OUTPATIENT)
Dept: LAB | Age: 68
Discharge: HOME | End: 2018-04-27
Payer: MEDICARE

## 2018-04-27 ENCOUNTER — OFFICE VISIT (OUTPATIENT)
Dept: INTERNAL MEDICINE CLINIC | Age: 68
End: 2018-04-27

## 2018-04-27 VITALS
OXYGEN SATURATION: 98 % | SYSTOLIC BLOOD PRESSURE: 122 MMHG | TEMPERATURE: 98.7 F | RESPIRATION RATE: 16 BRPM | HEART RATE: 61 BPM | HEIGHT: 63 IN | BODY MASS INDEX: 21.44 KG/M2 | WEIGHT: 121 LBS | DIASTOLIC BLOOD PRESSURE: 76 MMHG

## 2018-04-27 DIAGNOSIS — R73.02 IMPAIRED GLUCOSE TOLERANCE: ICD-10-CM

## 2018-04-27 DIAGNOSIS — K76.0 NAFLD (NONALCOHOLIC FATTY LIVER DISEASE): ICD-10-CM

## 2018-04-27 DIAGNOSIS — E78.5 DYSLIPIDEMIA: Primary | ICD-10-CM

## 2018-04-27 DIAGNOSIS — I10 HTN, GOAL BELOW 130/80: ICD-10-CM

## 2018-04-27 PROCEDURE — 80061 LIPID PANEL: CPT

## 2018-04-27 PROCEDURE — 36415 COLL VENOUS BLD VENIPUNCTURE: CPT

## 2018-04-27 PROCEDURE — 84443 ASSAY THYROID STIM HORMONE: CPT

## 2018-04-27 PROCEDURE — 83036 HEMOGLOBIN GLYCOSYLATED A1C: CPT

## 2018-04-27 PROCEDURE — 80053 COMPREHEN METABOLIC PANEL: CPT

## 2018-04-27 RX ORDER — ATENOLOL 50 MG/1
TABLET ORAL
Qty: 180 TAB | Refills: 1 | Status: SHIPPED | OUTPATIENT
Start: 2018-04-27 | End: 2018-10-04 | Stop reason: SDUPTHER

## 2018-04-27 NOTE — PROGRESS NOTES
HISTORY OF PRESENT ILLNESS  Kasey Goldberg is a 80 y.o. female. HPI  Followup, med check. Issues:   Hypertension. At home, her blood pressure is doing great in the 119 - 130/64 - 79 range. Heart rate in the 60s. She denies dyspnea on exertion, chest pain or palpitations or edema. She is walking, but admits to not as much as she could be. She has a history of nonalcoholic fatty liver disease. We discussed screening of liver to be sure no early cirrhosis. She wants to hold off on that for now, but will discuss further with her son. She is on Lovastatin and tolerates it well. No myalgias. She needs labs. Review of Systems   Constitutional: Negative for chills, fever, malaise/fatigue and weight loss. Respiratory: Negative for cough, shortness of breath and wheezing. Cardiovascular: Negative for chest pain, palpitations, orthopnea, leg swelling and PND. Gastrointestinal: Negative for abdominal pain, constipation, diarrhea, heartburn and nausea. Musculoskeletal: Negative for myalgias. Neurological: Negative for dizziness and headaches. Physical Exam   Constitutional: She is oriented to person, place, and time. She appears well-developed and well-nourished. HENT:   Head: Normocephalic and atraumatic. Neck: Normal range of motion. Neck supple. Carotid bruit is not present. No thyromegaly present. Cardiovascular: Normal rate, regular rhythm, S1 normal, S2 normal, normal heart sounds and intact distal pulses. No murmur heard. Pulmonary/Chest: Effort normal and breath sounds normal. No respiratory distress. She has no wheezes. She has no rales. Abdominal: Soft. Bowel sounds are normal. She exhibits no distension and no mass. There is no tenderness. Musculoskeletal: She exhibits no edema. Neurological: She is alert and oriented to person, place, and time. Skin: No rash noted. Psychiatric: She has a normal mood and affect.  Her behavior is normal.   Nursing note and vitals reviewed. ASSESSMENT and PLAN  Diagnoses and all orders for this visit:    1. Dyslipidemia  -     METABOLIC PANEL, COMPREHENSIVE  -     LIPID PANEL  -     TSH 3RD GENERATION    2. NAFLD (nonalcoholic fatty liver disease)    3. HTN, goal below 130/80  -     atenolol (TENORMIN) 50 mg tablet; TAKE 1 TABLET BY MOUTH TWO TIMES DAILY    4.  Impaired glucose tolerance  -     HEMOGLOBIN A1C WITH EAG

## 2018-04-27 NOTE — MR AVS SNAPSHOT
[address] [place] [address] 
 
 
 [address] [place] [address] 
[phone] Patient: Tasha Collins MRN: [id number] UTY:6/9/2456 Visit Information Date & Time Provider Department Dept. Phone Encounter #  
 4/27/2018 11:15 AM Carson Arias MD [facility] [address] [id number] Upcoming Health Maintenance Date Due  
 GLAUCOMA SCREENING Q2Y 1/1/2016 Pneumococcal 65+ Low/Medium Risk (2 of 2 - PPSV23) 11/9/2016 MEDICARE YEARLY EXAM 6/17/2018 Influenza Age 5 to Adult 8/1/2018 BREAST CANCER SCRN MAMMOGRAM 7/10/2019 DTaP/Tdap/Td series (2 - Td) 6/9/2021 COLONOSCOPY 6/12/2021 Allergies as of 4/27/2018  Review Complete On: 4/27/2018 By: Lela Cain LPN Severity Noted Reaction Type Reactions Shellfish Derived  05/11/2016    Diarrhea, Nausea and Vomiting Current Immunizations  Reviewed on 10/6/2017 Name Date Influenza High Dose Vaccine PF 10/6/2017 Pneumococcal Conjugate (PCV-13) 11/9/2015 Pneumococcal Polysaccharide (PPSV-23) 6/9/2011 Tdap 6/9/2011 Zoster Vaccine, Live 6/17/2013 Not reviewed this visit You Were Diagnosed With   
  
 Codes Comments Dyslipidemia    -  Primary ICD-10-CM: E78.5 ICD-9-CM: 272.4 NAFLD (nonalcoholic fatty liver disease)     ICD-10-CM: K76.0 ICD-9-CM: 571.8   
 HTN, goal below 130/80     ICD-10-CM: I10 
ICD-9-CM: 401.9 Impaired glucose tolerance     ICD-10-CM: R73.02 
ICD-9-CM: 790.22 Vitals BP Pulse Temp Resp Height(growth percentile) Weight(growth percentile) 122/76 (BP 1 Location: Right arm, BP Patient Position: Sitting) 61 98.7 °F (37.1 °C) (Oral) 16 5' 3\" (1.6 m) 121 lb (54.9 kg) SpO2 BMI OB Status Smoking Status 98% 21.43 kg/m2 Hysterectomy Former Smoker Vitals History BMI and BSA Data Body Mass Index Body Surface Area  
 21.43 kg/m 2 1.56 m 2 Preferred Pharmacy Pharmacy Name Phone [id number] - [address], 1 [facility] [phone] Your Updated Medication List  
  
   
This list is accurate as of 4/27/18 11:36 AM.  Always use your most recent med list.  
  
  
  
  
 aspirin 81 mg chewable tablet Take 81 mg by mouth daily. atenolol 50 mg tablet Commonly known as:  TENORMIN  
TAKE 1 TABLET BY MOUTH TWO TIMES DAILY Cholecalciferol (Vitamin D3) 2,000 unit Cap capsule Commonly known as:  VITAMIN D3 Take 2,000 Units by mouth daily. hydroCHLOROthiazide 12.5 mg tablet Commonly known as:  HYDRODIURIL Take 1 Tab by mouth daily. lovastatin 20 mg tablet Commonly known as:  MEVACOR  
TAKE 1 TABLET BY MOUTH NIGHTLY  
  
 multivitamin, tx-iron-ca-min 9 mg iron-400 mcg Tab tablet Commonly known as:  THERA-M w/ IRON Take 1 Tab by mouth daily. omega-3 fatty acids-vitamin e 1,000 mg Cap Take 2 Caps by mouth. OTHER  
  
 valsartan 320 mg tablet Commonly known as:  DIOVAN  
TAKE 1 TABLET BY MOUTH EVERY DAY Prescriptions Sent to Pharmacy Refills  
 atenolol (TENORMIN) 50 mg tablet 1 Sig: TAKE 1 TABLET BY MOUTH TWO TIMES DAILY Class: Normal  
 Pharmacy: [facility] [address], 1 [facility] #: [phone] We Performed the Following HEMOGLOBIN A1C WITH EAG [21217 CPT(R)] LIPID PANEL [50743 CPT(R)] METABOLIC PANEL, COMPREHENSIVE [33859 CPT(R)] TSH 3RD GENERATION [03965 CPT(R)] Introducing [facility]! Marii Cruz introduces [employer] patient portal. Now you can access parts of your medical record, email your doctor's office, and request medication refills online. 1. In your internet browser, go to [email or web address] 2. Click on the First Time User? Click Here link in the Sign In box. You will see the New Member Sign Up page. 3. Enter your Blast Ramp Access Code exactly as it appears below. You will not need to use this code after youve completed the sign-up process. If you do not sign up before the expiration date, you must request a new code. · Bl[employer]amp Access Code: [email or web address] Expires: 7/26/2018 11:36 AM 
 
4. Enter the last four digits of your Social Security Number (xxxx) and Date of Birth (mm/dd/yyyy) as indicated and click Submit. You will be taken to the next sign-up page. 5. Create a Blast Ramp ID. This will be your Blast Ramp login ID and cannot be changed, so think of one that is secure and easy to remember. 6. Create a Blast Ramp password. You can change your password at any time. 7. Enter your Password Reset Question and Answer. This can be used at a later time if you forget your password. 8. Enter your e-mail address. You will receive e-mail notification when new information is available in [address] 9. Click Sign Up. You can now view and download portions of your medical record. 10. Click the Download Summary menu link to download a portable copy of your medical information. If you have questions, please visit the Frequently Asked Questions section of the Blast Ramp website. Remember, Bl[employer] Ramp is NOT to be used for urgent needs. For medical emergencies, dial 911. Now available from your iPhone and Android! Please provide this summary of care documentation to your next provider. Your primary care clinician is listed as Buck [address]. If you have any questions after today's visit, please call [phone].

## 2018-04-28 LAB
ALBUMIN SERPL-MCNC: 4.6 G/DL (ref 3.6–4.8)
ALBUMIN/GLOB SERPL: 2.1 {RATIO} (ref 1.2–2.2)
ALP SERPL-CCNC: 67 IU/L (ref 39–117)
ALT SERPL-CCNC: 49 IU/L (ref 0–32)
AST SERPL-CCNC: 75 IU/L (ref 0–40)
BILIRUB SERPL-MCNC: 0.6 MG/DL (ref 0–1.2)
BUN SERPL-MCNC: 15 MG/DL (ref 8–27)
BUN/CREAT SERPL: 21 (ref 12–28)
CALCIUM SERPL-MCNC: 9.9 MG/DL (ref 8.7–10.3)
CHLORIDE SERPL-SCNC: 97 MMOL/L (ref 96–106)
CHOLEST SERPL-MCNC: 193 MG/DL (ref 100–199)
CO2 SERPL-SCNC: 26 MMOL/L (ref 18–29)
CREAT SERPL-MCNC: 0.72 MG/DL (ref 0.57–1)
EST. AVERAGE GLUCOSE BLD GHB EST-MCNC: 105 MG/DL
GFR SERPLBLD CREATININE-BSD FMLA CKD-EPI: 100 ML/MIN/1.73
GFR SERPLBLD CREATININE-BSD FMLA CKD-EPI: 87 ML/MIN/1.73
GLOBULIN SER CALC-MCNC: 2.2 G/DL (ref 1.5–4.5)
GLUCOSE SERPL-MCNC: 108 MG/DL (ref 65–99)
HBA1C MFR BLD: 5.3 % (ref 4.8–5.6)
HDLC SERPL-MCNC: 80 MG/DL
INTERPRETATION, 910389: NORMAL
LDLC SERPL CALC-MCNC: 82 MG/DL (ref 0–99)
POTASSIUM SERPL-SCNC: 4.2 MMOL/L (ref 3.5–5.2)
PROT SERPL-MCNC: 6.8 G/DL (ref 6–8.5)
SODIUM SERPL-SCNC: 140 MMOL/L (ref 134–144)
TRIGL SERPL-MCNC: 156 MG/DL (ref 0–149)
TSH SERPL DL<=0.005 MIU/L-ACNC: 2.07 UIU/ML (ref 0.45–4.5)
VLDLC SERPL CALC-MCNC: 31 MG/DL (ref 5–40)

## 2018-04-29 NOTE — PROGRESS NOTES
I thought you might want to see her labs-she seems to be doing so well. We discussed seeing gi for the scan of liver to be sure no cirrhosis given NAFLd-she wanted to discuss with you. I like that in a mom-wish my mom was interested in my medical opinion!

## 2018-06-11 ENCOUNTER — TELEPHONE (OUTPATIENT)
Dept: INTERNAL MEDICINE CLINIC | Age: 68
End: 2018-06-11

## 2018-06-11 ENCOUNTER — HOSPITAL ENCOUNTER (OUTPATIENT)
Dept: LAB | Age: 68
Discharge: HOME | End: 2018-06-11
Payer: MEDICARE

## 2018-06-11 DIAGNOSIS — S30.861A TICK BITE OF ABDOMEN, INITIAL ENCOUNTER: Primary | ICD-10-CM
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
## 2018-06-11 PROCEDURE — 86618 LYME DISEASE ANTIBODY: CPT

## 2018-06-11 RX ORDER — DOXYCYCLINE 100 MG/1
100 TABLET ORAL 2 TIMES DAILY
Qty: 28 TAB | Refills: 0 | Status: SHIPPED | OUTPATIENT
Start: 2018-06-11 | End: 2018-06-27 | Stop reason: ALTCHOICE

## 2018-06-11 NOTE — TELEPHONE ENCOUNTER
Tick bite in fold of abdominal skin with redness and warmth and circular rash-going to [facility] in am  Will check labs but start doxy empirically

## 2018-06-12 LAB — B BURGDOR IGM SER IA-ACNC: <0.8 INDEX (ref 0–0.79)

## 2018-06-27 ENCOUNTER — OFFICE VISIT (OUTPATIENT)
Dept: INTERNAL MEDICINE CLINIC | Age: 68
End: 2018-06-27

## 2018-06-27 VITALS
RESPIRATION RATE: 16 BRPM | SYSTOLIC BLOOD PRESSURE: 129 MMHG | WEIGHT: 122 LBS | TEMPERATURE: 98.3 F | BODY MASS INDEX: 21.62 KG/M2 | OXYGEN SATURATION: 98 % | HEART RATE: 67 BPM | HEIGHT: 63 IN | DIASTOLIC BLOOD PRESSURE: 72 MMHG

## 2018-06-27 DIAGNOSIS — N81.4 UTERINE PROLAPSE: Primary | ICD-10-CM

## 2018-06-27 DIAGNOSIS — N81.11 CYSTOCELE, MIDLINE: ICD-10-CM

## 2018-06-27 DIAGNOSIS — B37.2 CANDIDAL INTERTRIGO: ICD-10-CM

## 2018-06-27 RX ORDER — NYSTATIN 100000 [USP'U]/G
POWDER TOPICAL
Qty: 30 G | Refills: 2 | Status: SHIPPED | OUTPATIENT
Start: 2018-06-27 | End: 2019-03-01 | Stop reason: ALTCHOICE

## 2018-06-28 ENCOUNTER — DOCUMENTATION ONLY (OUTPATIENT)
Dept: INTERNAL MEDICINE CLINIC | Age: 68
End: 2018-06-28

## 2018-06-28 NOTE — PROGRESS NOTES
Patient is scheduled to see Platt Saint with the [facility] on Aug 16th at 11:00. Patient notified of appt info & voiced understanding.

## 2018-07-08 ENCOUNTER — PATIENT MESSAGE (OUTPATIENT)
Dept: INTERNAL MEDICINE CLINIC | Age: 68
End: 2018-07-08

## 2018-07-09 NOTE — TELEPHONE ENCOUNTER
From: Byron Maria  To: Jenny Young MD  Sent: 7/8/2018 5:50 PM EDT  Subject: Visit Follow-Up Question    Hi, Dr. Jose Armando Rene,  My problem is getting worst: my organs are coming lower and almost show out. Aura Chavez will talk about it tomorrow with you, to see if I can get an earlier appointment with Dr. Jewell Bradford or another one.   Have a nice evening  Paulette Riedel Ravussin

## 2018-07-21 DIAGNOSIS — E78.5 DYSLIPIDEMIA: ICD-10-CM

## 2018-07-21 RX ORDER — LOVASTATIN 20 MG/1
TABLET ORAL
Qty: 90 TAB | Refills: 1 | Status: SHIPPED | OUTPATIENT
Start: 2018-07-21 | End: 2019-01-21 | Stop reason: SDUPTHER

## 2018-07-24 ENCOUNTER — OFFICE VISIT (OUTPATIENT)
Dept: OBGYN CLINIC | Age: 68
End: 2018-07-24

## 2018-07-24 VITALS
HEART RATE: 73 BPM | DIASTOLIC BLOOD PRESSURE: 86 MMHG | SYSTOLIC BLOOD PRESSURE: 167 MMHG | HEIGHT: 63 IN | WEIGHT: 122 LBS | BODY MASS INDEX: 21.62 KG/M2

## 2018-07-24 DIAGNOSIS — Z12.4 PAP SMEAR FOR CERVICAL CANCER SCREENING: ICD-10-CM

## 2018-07-24 DIAGNOSIS — N81.6 RECTOCELE: Primary | ICD-10-CM

## 2018-07-24 NOTE — PATIENT INSTRUCTIONS
Uterine Prolapse: Care Instructions Your Care Instructions When the uterus moves down in the pelvis and starts to press into the vagina, it is called uterine prolapse. This happens when the pelvic muscles and tissues get weak. Women often have this problem after they have children or when they get older. It can also happen if you are overweight or you have a long-term cough. These put extra pressure on the uterus. This problem may cause you to leak urine. Or you may have trouble passing urine or stool. You may feel pain during sex. But in most cases, prolapse doesn't cause more serious health problems. You may feel better if you change how you do some of your daily activities. And you can try exercises to make your pelvic muscles strong. But if these don't help, you may want to talk with your doctor about surgery. Follow-up care is a key part of your treatment and safety. Be sure to make and go to all appointments, and call your doctor if you are having problems. It's also a good idea to know your test results and keep a list of the medicines you take. How can you care for yourself at home? · Do not do activities that put pressure on your pelvic muscles. This includes heavy lifting and straining. · Do exercises to tighten and strengthen your pelvic muscles. These are called Kegel exercises. To do them: 
¨ Squeeze the muscles you use to stop urine. Your belly and thighs should not move. ¨ Hold the squeeze for 3 seconds. Then relax for 3 seconds. ¨ Start with 3 seconds. Then add 1 second each week until you are able to squeeze for 10 seconds. ¨ Repeat this 10 to 15 times. Do these exercises 3 or more times a day. · Take an over-the-counter pain medicine, such as acetaminophen (Tylenol), ibuprofen (Advil, Motrin), or naproxen (Aleve), to relieve pain. Read and follow all instructions on the label. · Do not take two or more pain medicines at the same time unless the doctor told you to.  Many pain medicines have acetaminophen, which is Tylenol. Too much acetaminophen (Tylenol) can be harmful. · Talk with your doctor about a vaginal pessary. This is a device that you put in your vagina to support the uterus. Your doctor can teach you how and when to remove it. You will also learn how to clean it and put it back in. 
· If your doctor prescribes estrogen cream for your vagina, use it exactly as prescribed. · To relieve pressure on your vagina, lie down and put a pillow under your knees. Or you can lie on your side and bring your knees up to your chest. 
· If you are overweight, talk to your doctor about safe ways to lose weight. When should you call for help? Watch closely for changes in your health, and be sure to contact your doctor if: 
  · You have new urinary symptoms. These may include leaking urine, having pain when urinating, or feeling like you need to urinate often.  
  · You have trouble passing stool.  
  · You have pain or a feeling of fullness in your vagina.  
  · You do not get better as expected. Where can you learn more? Go to http://darian-brayden.info/. Enter N255 in the search box to learn more about \"Uterine Prolapse: Care Instructions. \" Current as of: October 6, 2017 Content Version: 11.7 © 9479-7781 [employer]. Care instructions adapted under license by [employer] (which disclaims liability or warranty for this information). If you have questions about a medical condition or this instruction, always ask your healthcare professional. [place] [address] any warranty or liability for your use of this information.

## 2018-07-24 NOTE — PROGRESS NOTES
Prolapse evaluation  Chief Complaint   Uterine Prolapse    HPI:  The patient specifically complains of vaginal pressure and a bulging sensation at the introitus. The bulge is visible to the patient. First noticed about 6wks ago, noted while showeing (may have been there longer than that). Since then, seems more prominent. The patient states she does not have difficulties emptying her bladder. She states that she does not have problems with completing bowel movements. .   She states the symptoms are not accentuated by lifting, straining, and exercise. The symptoms are partially relieved by lying down. She does not  have pelvic pain associated with this. May have some mild pelvic pressure at the end of the day if standing a lot. The patient has the following additional complaints: none  No h/o abnl paps. Last pap done in . Past Medical History:   Diagnosis Date    HTN, goal below 130/80 2015    Hx of bone density study 2016    Osteopenic    Hx of mammogram 07/10/2017    Negtaive     Impaired glucose tolerance 2015    Osteopenia 2015    Routine Papanicolaou smear     Negative per pt. Past Surgical History:   Procedure Laterality Date    HX APPENDECTOMY  1963    HX  SECTION      HX COLONOSCOPY      HX ORTHOPAEDIC  13    right foot     Social History     Occupational History    Not on file. Social History Main Topics    Smoking status: Former Smoker     Years: 15.00     Quit date: 2012    Smokeless tobacco: Never Used    Alcohol use 0.0 oz/week     0 Standard drinks or equivalent per week    Drug use: No    Sexual activity: Not on file     Family History   Problem Relation Age of Onset    Hypertension Father     Hypertension Sister        Allergies   Allergen Reactions    Shellfish Derived Diarrhea and Nausea and Vomiting     Prior to Admission medications    Medication Sig Start Date End Date Taking?  Authorizing Provider lovastatin (MEVACOR) 20 mg tablet TAKE 1 TABLET BY MOUTH NIGHTLY 7/21/18  Yes Juanita Clifton MD   atenolol (TENORMIN) 50 mg tablet TAKE 1 TABLET BY MOUTH TWO TIMES DAILY 4/27/18  Yes Juanita Clifton MD   hydroCHLOROthiazide (HYDRODIURIL) 12.5 mg tablet Take 1 Tab by mouth daily. 10/6/17  Yes Juantia Clifton MD   valsartan (DIOVAN) 320 mg tablet TAKE 1 TABLET BY MOUTH EVERY DAY 10/6/17  Yes Juanita Clifton MD   omega-3 fatty acids-vitamin e 1,000 mg cap Take 2 Caps by mouth. Yes Historical Provider   Cholecalciferol, Vitamin D3, (VITAMIN D3) 2,000 unit cap capsule Take 2,000 Units by mouth daily. 11/10/15  Yes Juanita Clifton MD   multivitamin, tx-iron-ca-min (THERA-M W/ IRON) 9 mg iron-400 mcg tab tablet Take 1 Tab by mouth daily. Yes Historical Provider   OTHER    Yes Historical Provider   aspirin 81 mg chewable tablet Take 81 mg by mouth daily.    Yes Historical Provider   nystatin (MYCOSTATIN) powder Bid prn 6/27/18   Juanita Clifton MD        Review of Systems: History obtained from the patient  Constitutional: negative for weight loss, fever, night sweats  HEENT: negative for hearing loss, earache, congestion, snoring, sorethroat  CV: negative for chest pain, palpitations, edema  Resp: negative for cough, shortness of breath, wheezing  Breast: negative for breast lumps, nipple discharge, galactorrhea  GI: negative for change in bowel habits, abdominal pain, black or bloody stools  : negative for frequency, dysuria, hematuria, vaginal discharge  MSK: negative for back pain, joint pain, muscle pain  Skin: negative for itching, rash, hives  Neuro: negative for dizziness, headache, confusion, weakness  Psych: negative for anxiety, depression, change in mood  Heme/lymph: negative for bleeding, bruising, pallor    Objective:  Visit Vitals    /86    Pulse 73    Ht 5' 3\" (1.6 m)    Wt 122 lb (55.3 kg)    BMI 21.61 kg/m2       Physical Exam:     Constitutional  · Appearance: well-nourished, well developed, alert, in no acute distress    HENT  · Head and Face: appears normal    Gastrointestinal  · Abdominal Examination: abdomen non-tender to palpation, normal bowel sounds, no masses present  · Liver and spleen: no hepatomegaly present, spleen not palpable  · Hernias: no hernias identified    Genitourinary  · External Genitalia: bulge visible inside introitus at rest, otherwise normal appearance for age, no discharge present, no tenderness present, no inflammatory lesions present, no masses present, moderate atrophy present  · Vagina: atrophic vaginal vault, normal mucosa, with small cystocele, moderate rectocele that descends just through introitus with valsalva, small amount vaginal apex prolapse, no discharge present, no inflammatory lesions present, no masses present  · Bladder: non-tender to palpation  · Urethra: appears normal  · Cervix: normal   · Uterus: small amount descent, otherwise normal size, shape and consistency  · Adnexa: no adnexal tenderness present, no adnexal masses present  · Perineum: perineum within normal limits, no evidence of trauma, no rashes or skin lesions present  · Anus: anus within normal limits, no hemorrhoids present  · Inguinal Lymph Nodes: no lymphadenopathy present    Skin  · General Inspection: no rash, no lesions identified    Neurologic/Psychiatric  · Mental Status:  · Orientation: grossly oriented to person, place and time  · Mood and Affect: mood normal, affect appropriate    Assessment:  Rectocele. Not significantly symptomatic at this time, just some mild pressure. Plan:   Discussed conservative management, PT, pessary, surgery. Opts for conservative management at this time. RTO if interested in other intervention. If she is considering surgery, then should f/u with Dr. Shanta Alcantar. Pap done  RTO prn if symptoms persist or worsen. Instructions given to pt. Handouts given to pt.       Orders Placed This Encounter    PAP, IG, RFX HPV ASCUS ([id number])

## 2018-07-26 LAB
CYTOLOGIST CVX/VAG CYTO: NORMAL
CYTOLOGY CVX/VAG DOC THIN PREP: NORMAL
DX ICD CODE: NORMAL
LABCORP, 190119: NORMAL
Lab: NORMAL
Lab: NORMAL
OTHER STN SPEC: NORMAL
PATH REPORT.FINAL DX SPEC: NORMAL
STAT OF ADQ CVX/VAG CYTO-IMP: NORMAL

## 2018-08-08 RX ORDER — IRBESARTAN 300 MG/1
300 TABLET ORAL
Qty: 30 TAB | Refills: 2 | Status: SHIPPED | OUTPATIENT
Start: 2018-08-08 | End: 2018-10-27 | Stop reason: SDUPTHER

## 2018-08-31 ENCOUNTER — HOSPITAL ENCOUNTER (OUTPATIENT)
Dept: PHYSICAL THERAPY | Age: 68
Discharge: HOME | End: 2018-08-31
Payer: MEDICARE

## 2018-08-31 PROCEDURE — 97161 PT EVAL LOW COMPLEX 20 MIN: CPT | Performed by: PHYSICAL THERAPIST

## 2018-08-31 PROCEDURE — G8984 CARRY CURRENT STATUS: HCPCS | Performed by: PHYSICAL THERAPIST

## 2018-08-31 PROCEDURE — 97140 MANUAL THERAPY 1/> REGIONS: CPT | Performed by: PHYSICAL THERAPIST

## 2018-08-31 PROCEDURE — G8985 CARRY GOAL STATUS: HCPCS | Performed by: PHYSICAL THERAPIST

## 2018-08-31 PROCEDURE — 97014 ELECTRIC STIMULATION THERAPY: CPT | Performed by: PHYSICAL THERAPIST

## 2018-08-31 PROCEDURE — 97110 THERAPEUTIC EXERCISES: CPT | Performed by: PHYSICAL THERAPIST

## 2018-08-31 NOTE — PROGRESS NOTES
PT INITIAL EVALUATION NOTE - [facility] 2-15 Patient Name: Awilda Madrid Date:2018 : 1950 [x]  Patient  Verified Payor: [place] MED[place] / Plan: Melonie Gauthier y / Product Type: Medicare / In time:1000  Out time:1110 Total Treatment Time (min): 70 Total Timed Codes (min): 70 
1:1 Treatment Time ( [address] only): 60 Visit #: 1 Treatment Area: Neck pain [M54.2] SUBJECTIVE Pain Level (0-10 scale): 7 Any medication changes, allergies to medications, adverse drug reactions, diagnosis change, or new procedure performed?: [] No    [x] Yes (see summary sheet for update) Subjective:   
Patient reports that about 10 days ago she started having pain along the right aspect of her neck and shoulder. The pain is worse with prolonged sitting to read, knit, or use Ipad. She has difficulty turning neck to drive and does not tolerate sitting to drive over 20 minutes. She also reports that the pain wakes her up at night. She denies paresthesias, drop attacks, weakness, changes in bowel/bladder. Nothing makes her feel better, she has tried ibuprofen, creams, heat pack. No history of neck pain. No imaging done at this time. PLOF: gardening and knitting Mechanism of Injury: insidious Previous Treatment/Compliance: n/a PMHx/Surgical Hx: HBP controlled with medications, High cholesterol with statin use. Surgical Hx in chart (appendectomy, bunionectomy) Work Hx: n/a Living Situation: Living alone in 2 story home Pt Goals: to eliminate pain Barriers: none Motivation: motivated Substance use: none FABQ Score: - Cognition: A & O x 4 OBJECTIVE/EXAMINATION Posture: Forward head/mild thoracic kyphosis Other Observations:  Prominent C7/T1 Gait and Functional Mobility:  Significant hypomobility of mid/low-cervical spine with lateral glides L>R (C4-7) Palpation: TTP along right UT/levator scapula, C4-C7 spinous processes     Lumbar AROM:  Cervical AROM:   
    R  L  R  L 
 Flexion    -  -  WFL  - Extension   -  -  WFL-  - Side Bending   -  -  40 p!  45 Rotation       60  65 UPPER QUARTER   MUSCLE STRENGTH 
KEY       R  L 
0 - No Contraction  C1, C2 Neck Flex 4  4 
1 - Trace   C3 Side Flex  4  4 
2 - Poor   C4 Sh Elev  4  4 
3 - Fair    C5 Deltoid/Biceps 4  4 
4 - Good   C6 Wrist Ext  4  4 
5 - Normal   C7 Triceps  4  4 C8 Thumb Ext  4  4 
    T1 Hand Inst  4  4 MMT: - 
Neurological: Reflexes / Sensations: WNL Special Tests: cervical 
 Transverse lig/ALar stability: negative for pathology Compression/Distraction: negative Slumb: negative Modality rationale: decrease pain, increase tissue extensibility and increase muscle contraction/control to improve the patients ability to perform ADLs Min Type Additional Details 15 [] Estim: []Att   []Unatt        []TENS instruct [x]IFC  []Premod   []NMES []Other:  []w/US   []w/ice   []w/heat Position: supine Juanell  []  Traction: [] Cervical       []Lumbar 
                     [] Prone          []Supine []Intermittent   []Continuous Lbs: 
[] before manual 
[] after manual 
[]w/heat  
 []  Ultrasound: []Continuous   [] Pulsed at: 
                         []1MHz   []3MHz Location: 
W/cm2:  
 [] Paraffin Location:  
[]w/heat  
 []  Ice     []  Heat 
[]  Ice massage Position: Location:  
 []  Laser 
[]  Other: Position: Location:  
 
 []  Vasopneumatic Device Pressure:       [] lo [] med [] hi  
Temperature:   
 
[x] Skin assessment post-treatment:  [x]intact []redness- no adverse reaction 
  []redness  adverse reaction:  
 
15 min Therapeutic Exercise:  [x] See flow sheet :  
Rationale: increase ROM, increase strength and improve coordination to improve the patients ability to perform ADLs 15 min Manual Therapy: SOD, manual traction, upslides for cervical rotation, UT stretch and ms bending Rationale: decrease pain, increase ROM, increase tissue extensibility and decrease trigger points to improve the patients ability to sit to read/knit/type With 
 [] TE 
 [] TA 
 [] neuro 
 [] other: Patient Education: [x] Review HEP [] Progressed/Changed HEP based on:  
[] positioning   [] body mechanics   [] transfers   [] heat/ice application   
[] other:   
 
Other Objective/Functional Measures:- 
 
Pain Level (0-10 scale) post treatment: 0 
 
ASSESSMENT/Changes in Function:  
 
[x]  See Plan of Care Zen Contreras, PT 8/31/2018  10:09 AM

## 2018-08-31 NOTE — PROGRESS NOTES
[facility] Physical Therapy [facility] [address] Phone: [phone]  Fax: [phone] Plan of Care/Statement of Necessity for Physical Therapy Services  2-15 Patient name: Brandon Maldonado  : 1950  Provider#: [id number] Referral source: Carter De La Torre MD     
Medical/Treatment Diagnosis: Neck pain [M54.2] Prior Hospitalization: see medical history Comorbidities: HBP Prior Level of Function: see eval 
Medications: Verified on Patient Summary List 
Start of Care: 18      Onset Date: 2018 The Plan of Care and following information is based on the information from the initial evaluation. Assessment/ key information: Patient referred to OP PT for right sided neck and shoulder pain. She presents with hypomobility of thoracic and cervical spine, decrease flexibility of neck musculature, decrease postural awareness and strength deficits. She will benefit from skilled PT to address the above problems so that she can return to her ADLs at [facility]. Evaluation Complexity History LOW Complexity : Zero comorbidities / personal factors that will impact the outcome / POC; Examination LOW Complexity : 1-2 Standardized tests and measures addressing body structure, function, activity limitation and / or participation in recreation  ;Presentation LOW Complexity : Stable, uncomplicated  ;Clinical Decision Making LOW Complexity : FOTO score of  Overall Complexity Rating: LOW Problem List: pain affecting function, decrease ROM, decrease strength, decrease ADL/ functional abilitiies, decrease activity tolerance and decrease flexibility/ joint mobility Treatment Plan may include any combination of the following: Therapeutic exercise, Therapeutic activities, Neuromuscular re-education, Physical agent/modality, Manual therapy, Patient education, Self Care training and Functional mobility training Patient / Family readiness to learn indicated by: asking questions, trying to perform skills and interest 
Persons(s) to be included in education: patient (P) Barriers to Learning/Limitations: None Patient Goal (s): to eliminate pain Patient Self Reported Health Status: good Rehabilitation Potential: excellent Short Term Goals: To be accomplished in 6 weeks: To demonstrate compliance with HEP To increase cervical ROM by 10 degrees so patient can turn head to answer phone Decrease pain to <4/10 so that patient can knit for 20 minutes Improve cervical strength to 4/5 so patient can hold ipad to read 20 minutes Long Term Goals: To be accomplished in 12 weeks: 
Decrease overall pain to <2/10 so patient can sleep through night Improve cervical and shoulder strength to 5/5 so patient can lift purse without pain Demonstrate compliance with ergonomic sitting to read/knit Increase cervical ROM to [email or web address] so patient can turn head while driving Frequency / Duration: Patient to be seen 2 times per week for 12 weeks. Patient/ Caregiver education and instruction: self care, brace/ splint application and exercises 
 
[x]  Plan of care has been reviewed with PTA 
 
G-Codes (GP) Carry  Current  CJ= 20-39%  Goal  CJ= 20-39% The severity rating is based on clinical judgment and the FOTO Score score. Certification Period: 8/31/18-11/31/18 Marlene Phan, PT 8/31/2018 12:47 PM 
 
________________________________________________________________________ I certify that the above Therapy Services are being furnished while the patient is under my care. I agree with the treatment plan and certify that this therapy is necessary. [de-identified] Signature:____________________  Date:____________Time: _________

## 2018-09-04 ENCOUNTER — HOSPITAL ENCOUNTER (OUTPATIENT)
Dept: PHYSICAL THERAPY | Age: 68
Discharge: HOME | End: 2018-09-04
Payer: MEDICARE

## 2018-09-04 PROCEDURE — 97014 ELECTRIC STIMULATION THERAPY: CPT | Performed by: PHYSICAL THERAPIST

## 2018-09-04 PROCEDURE — 97110 THERAPEUTIC EXERCISES: CPT | Performed by: PHYSICAL THERAPIST

## 2018-09-04 PROCEDURE — 97140 MANUAL THERAPY 1/> REGIONS: CPT | Performed by: PHYSICAL THERAPIST

## 2018-09-04 NOTE — PROGRESS NOTES
PT DAILY TREATMENT NOTE - [facility] 2-15 Patient Name: Rachna Lynn Date:2018 : 1950 [x]  Patient  Verified Payor: [place] / Plan: Melonie Pruitty / Product Type: Medicare / In time:1000  Out time:1100 Total Treatment Time (min): 60 Total Timed Codes (min): 30 
1:1 Treatment Time ( only): 30 Visit #: 2 Treatment Area: Neck pain [M54.2] SUBJECTIVE Pain Level (0-10 scale): 0/10 Any medication changes, allergies to medications, adverse drug reactions, diagnosis change, or new procedure performed?: [x] No    [] Yes (see summary sheet for update) Subjective functional status/changes:   [] No changes reported My pain was completely gone after last visit. It came back slightly for a day, but left again the next day. OBJECTIVE Modality rationale: decrease pain and increase tissue extensibility to improve the patients ability to perform ADLs Min Type Additional Details 15 [] Estim: []Att   []Unatt        []TENS instruct [x]IFC  []Premod   []NMES []Other:  []w/US   []w/ice   [x]w/heat Position:supine Elgin Lax []  Traction: [] Cervical       []Lumbar 
                     [] Prone          []Supine []Intermittent   []Continuous Lbs: 
[] before manual 
[] after manual 
[]w/heat  
 []  Ultrasound: []Continuous   [] Pulsed at: 
                         []1MHz   []3MHz Location: 
W/cm2:  
 [] Paraffin Location:  
[]w/heat  
 []  Ice     []  Heat 
[]  Ice massage Position: Location:  
 []  Laser 
[]  Other: Position: Location:  
 
 []  Vasopneumatic Device Pressure:       [] lo [] med [] hi  
Temperature:   
 
[x] Skin assessment post-treatment:  [x]intact []redness- no adverse reaction 
  []redness  adverse reaction:  
 
30 min Therapeutic Exercise:  [x] See flow sheet :  
Rationale: increase ROM, increase strength and improve coordination to improve the patients ability to perform ADLs without pain 15 min Manual Therapy: UT/levator scapula TPR and stretching, manual traction, SOD Rationale: decrease pain, increase ROM, increase tissue extensibility, decrease trigger points and increase postural awareness to improve the patients ability to read, knit, sleep without pain With 
 [] TE 
 [] TA 
 [] neuro 
 [] other: Patient Education: [x] Review HEP [] Progressed/Changed HEP based on:  
[] positioning   [] body mechanics   [] transfers   [] heat/ice application   
[] other:   
 
Other Objective/Functional Measures: decrease tissue turgor and pain along right UT2 Pain Level (0-10 scale) post treatment:0/10 ASSESSMENT/Changes in Function:  
 
Patient will continue to benefit from skilled PT services to modify and progress therapeutic interventions, address functional mobility deficits, address ROM deficits, address strength deficits, analyze and address soft tissue restrictions, analyze and cue movement patterns, analyze and modify body mechanics/ergonomics and assess and modify postural abnormalities to attain remaining goals. []  See Plan of Care 
[]  See progress note/recertification 
[]  See Discharge Summary Progress towards goals / Updated goals: 
Making steady progress with therex without difficulty today PLAN [x]  Upgrade activities as tolerated     []  Continue plan of care 
[]  Update interventions per flow sheet      
[]  Discharge due to:_ 
[]  Other:_ Jay Jay Greer PT 9/4/2018  9:52 AM

## 2018-09-10 ENCOUNTER — HOSPITAL ENCOUNTER (OUTPATIENT)
Dept: PHYSICAL THERAPY | Age: 68
Discharge: HOME | End: 2018-09-10
Payer: MEDICARE

## 2018-09-10 PROCEDURE — 97110 THERAPEUTIC EXERCISES: CPT | Performed by: PHYSICAL THERAPIST

## 2018-09-10 PROCEDURE — 97140 MANUAL THERAPY 1/> REGIONS: CPT | Performed by: PHYSICAL THERAPIST

## 2018-09-10 PROCEDURE — 97014 ELECTRIC STIMULATION THERAPY: CPT | Performed by: PHYSICAL THERAPIST

## 2018-09-10 NOTE — PROGRESS NOTES
PT DAILY TREATMENT NOTE - [facility] 2-15 Patient Name: Ahmet Cardoza Date:9/10/2018 : 1950 [x]  Patient  Verified Payor: [place] MEDICARE / Plan: Melonie Pruitt / Product Type: Medicare / In time:020  Out time:030 Total Treatment Time (min): 60 Total Timed Codes (min): 60 
1:1 Treatment Time ([address] only): 60 Visit #: 3 Treatment Area: Neck pain [M54.2] SUBJECTIVE Pain Level (0-10 scale): 3/10 Any medication changes, allergies to medications, adverse drug reactions, diagnosis change, or new procedure performed?: [x] No    [] Yes (see summary sheet for update) Subjective functional status/changes:   [] No changes reported The base of my neck is a little more sore today and has been since yesterday. Nothing traveling down my arm. OBJECTIVE Modality rationale: decrease pain and increase tissue extensibility to improve the patients ability to perform ADLs without Min Type Additional Details 15 [] Estim: []Att   []Unatt        []TENS instruct [x]IFC  []Premod   []NMES []Other:  []w/US   []w/ice   []w/heat Position: Location:  
 []  Traction: [] Cervical       []Lumbar 
                     [] Prone          []Supine []Intermittent   []Continuous Lbs: 
[] before manual 
[] after manual 
[]w/heat  
 []  Ultrasound: []Continuous   [] Pulsed at: 
                         []1MHz   []3MHz Location: 
W/cm2:  
 [] Paraffin Location:  
[]w/heat  
 []  Ice     []  Heat 
[]  Ice massage Position: Location:  
 []  Laser 
[]  Other: Position: Location:  
 
 []  Vasopneumatic Device Pressure:       [] lo [] med [] hi  
Temperature:   
 
[x] Skin assessment post-treatment:  [x]intact []redness- no adverse reaction 
  []redness  adverse reaction:  
 
30 min Therapeutic Exercise:  [] See flow sheet :  
Rationale: increase ROM, increase strength, improve coordination and increase proprioception to improve the patients ability to perform ADLs without pain 15 min Manual Therapy: SOD, manual traction, OA nodding R>L, upslides for rotation with OP Rationale: decrease pain, increase ROM, increase tissue extensibility, decrease trigger points and increase postural awareness to improve the patients ability to perform ADLs without pain With 
 [] TE 
 [] TA 
 [] neuro 
 [] other: Patient Education: [x] Review HEP [] Progressed/Changed HEP based on:  
[] positioning   [] body mechanics   [] transfers   [] heat/ice application   
[] other:   
 
Other Objective/Functional Measures: hypomobility noted in OA joint, improved with manual nodding and self traction with towel Pain Level (0-10 scale) post treatment: 0 
 
ASSESSMENT/Changes in Function:  
 
Patient will continue to benefit from skilled PT services to modify and progress therapeutic interventions, address functional mobility deficits, address ROM deficits, address strength deficits, analyze and address soft tissue restrictions, analyze and cue movement patterns, analyze and modify body mechanics/ergonomics and assess and modify postural abnormalities to attain remaining goals. []  See Plan of Care 
[]  See progress note/recertification 
[]  See Discharge Summary Progress towards goals / Updated goals: 
Patient tolerated manual therapy well today and provided with progression of her HEP for pain management PLAN [x]  Upgrade activities as tolerated     []  Continue plan of care 
[]  Update interventions per flow sheet      
[]  Discharge due to:_ 
[]  Other:_ Merle Santos, PT 9/10/2018  2:23 PM

## 2018-09-12 ENCOUNTER — APPOINTMENT (OUTPATIENT)
Dept: PHYSICAL THERAPY | Age: 68
End: 2018-09-12
Payer: MEDICARE

## 2018-09-17 ENCOUNTER — HOSPITAL ENCOUNTER (OUTPATIENT)
Dept: PHYSICAL THERAPY | Age: 68
Discharge: HOME | End: 2018-09-17
Payer: MEDICARE

## 2018-09-17 DIAGNOSIS — I10 HTN, GOAL BELOW 130/80: ICD-10-CM

## 2018-09-17 PROCEDURE — 97140 MANUAL THERAPY 1/> REGIONS: CPT | Performed by: PHYSICAL THERAPIST

## 2018-09-17 PROCEDURE — 97110 THERAPEUTIC EXERCISES: CPT | Performed by: PHYSICAL THERAPIST

## 2018-09-17 PROCEDURE — 97014 ELECTRIC STIMULATION THERAPY: CPT | Performed by: PHYSICAL THERAPIST

## 2018-09-17 RX ORDER — HYDROCHLOROTHIAZIDE 12.5 MG/1
TABLET ORAL
Qty: 90 TAB | Refills: 3 | Status: SHIPPED | OUTPATIENT
Start: 2018-09-17 | End: 2018-11-15 | Stop reason: ALTCHOICE
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
## 2018-09-20 ENCOUNTER — APPOINTMENT (OUTPATIENT)
Dept: PHYSICAL THERAPY | Age: 68
End: 2018-09-20
Payer: MEDICARE

## 2018-09-24 ENCOUNTER — HOSPITAL ENCOUNTER (OUTPATIENT)
Dept: PHYSICAL THERAPY | Age: 68
Discharge: HOME | End: 2018-09-24
Payer: MEDICARE

## 2018-09-24 PROCEDURE — 97014 ELECTRIC STIMULATION THERAPY: CPT | Performed by: PHYSICAL THERAPIST

## 2018-09-24 PROCEDURE — 97140 MANUAL THERAPY 1/> REGIONS: CPT | Performed by: PHYSICAL THERAPIST

## 2018-09-24 PROCEDURE — 97110 THERAPEUTIC EXERCISES: CPT | Performed by: PHYSICAL THERAPIST

## 2018-09-24 NOTE — PROGRESS NOTES
PT DAILY TREATMENT NOTE - [facility] 2-15 Patient Name: Bernarda Elizondo Date:2018 : 1950 [x]  Patient  Verified Payor: [place] MEDICARE / Plan: Melonie Pruitt / Product Type: Medicare / In time:1030  Out time:1130 Total Treatment Time (min): 60 Total Timed Codes (min): 30 
1:1 Treatment Time ( only): 30 Visit #: 7 Treatment Area: Neck pain [M54.2] SUBJECTIVE Pain Level (0-10 scale): 1/10 Any medication changes, allergies to medications, adverse drug reactions, diagnosis change, or new procedure performed?: [x] No    [] Yes (see summary sheet for update) Subjective functional status/changes:   [] No changes reported Patient reports that she did a little more knitting over the weekend and thinks she slept funny. Today she has some tenderness at the base of her head along the right sidt. OBJECTIVE Modality rationale: decrease pain and increase tissue extensibility to improve the patients ability to perform ADLs Min Type Additional Details 15 [] Estim: []Att   []Unatt        []TENS instruct [x]IFC  []Premod   []NMES []Other:  []w/US   []w/ice   [x]w/heat Position:supine Karl Child []  Traction: [] Cervical       []Lumbar 
                     [] Prone          []Supine []Intermittent   []Continuous Lbs: 
[] before manual 
[] after manual 
[]w/heat  
 []  Ultrasound: []Continuous   [] Pulsed at: 
                         []1MHz   []3MHz Location: 
W/cm2:  
 [] Paraffin Location:  
[]w/heat  
 []  Ice     []  Heat 
[]  Ice massage Position: Location:  
 []  Laser 
[]  Other: Position: Location:  
 
 []  Vasopneumatic Device Pressure:       [] lo [] med [] hi  
Temperature:   
 
[x] Skin assessment post-treatment:  [x]intact []redness- no adverse reaction 
  []redness  adverse reaction:  
 
30 min Therapeutic Exercise:  [x] See flow sheet :  
 Rationale: increase ROM, increase strength and improve coordination to improve the patients ability to perform lifting/reading/carrying 15 min Manual Therapy: SOD, UT stretching and TPR, manual traction, lateral glides Rationale: decrease pain, increase ROM, increase tissue extensibility, decrease trigger points and increase postural awareness to improve the patients ability to perform ADLs With 
 [] TE 
 [] TA 
 [] neuro 
 [] other: Patient Education: [x] Review HEP [] Progressed/Changed HEP based on:  
[] positioning   [] body mechanics   [] transfers   [] heat/ice application   
[] other:   
 
Other Objective/Functional Measures: TTP along nuchal line. Improved with manual therapy Pain Level (0-10 scale) post treatment: 0 
 
ASSESSMENT/Changes in Function:  
 
Patient will continue to benefit from skilled PT services to modify and progress therapeutic interventions, address functional mobility deficits, address ROM deficits, address strength deficits, analyze and address soft tissue restrictions, analyze and cue movement patterns, analyze and modify body mechanics/ergonomics and assess and modify postural abnormalities to attain remaining goals. []  See Plan of Care 
[]  See progress note/recertification 
[]  See Discharge Summary Progress towards goals / Updated goals: 
Patient is making steady progress towards strength and pain goals PLAN 
[]  Upgrade activities as tolerated     [x]  Continue plan of care 
[]  Update interventions per flow sheet      
[]  Discharge due to:_ 
[]  Other:_ Lisha Garcia, PT 9/24/2018

## 2018-09-26 ENCOUNTER — HOSPITAL ENCOUNTER (OUTPATIENT)
Dept: PHYSICAL THERAPY | Age: 68
Discharge: HOME | End: 2018-09-26
Payer: MEDICARE

## 2018-09-26 PROCEDURE — 97014 ELECTRIC STIMULATION THERAPY: CPT | Performed by: PHYSICAL THERAPIST

## 2018-09-26 PROCEDURE — 97110 THERAPEUTIC EXERCISES: CPT | Performed by: PHYSICAL THERAPIST

## 2018-09-26 PROCEDURE — 97140 MANUAL THERAPY 1/> REGIONS: CPT | Performed by: PHYSICAL THERAPIST

## 2018-09-26 NOTE — PROGRESS NOTES
PT DAILY TREATMENT NOTE - [facility] 2-15 Patient Name: Kinga Whitney Date:2018 : 1950 [x]  Patient  Verified Payor: [place] MEDICARE / Plan: eMlonie Pruitty / Product Type: Medicare / In time:1200  Out time:100 Total Treatment Time (min): 60 Total Timed Codes (min): 30 
1:1 Treatment Time ( only): 30 Visit #: 6 Treatment Area: Neck pain [M54.2] SUBJECTIVE Pain Level (0-10 scale): 1/10 Any medication changes, allergies to medications, adverse drug reactions, diagnosis change, or new procedure performed?: [x] No    [] Yes (see summary sheet for update) Subjective functional status/changes:   [] No changes reported Patient reports that her neck is not painful today. She is starting to have more pain in the the right hip/buttock region. OBJECTIVE Modality rationale: decrease pain and increase tissue extensibility to improve the patients ability to perform ADLs Min Type Additional Details 15 [] Estim: []Att   []Unatt        []TENS instruct [x]IFC  []Premod   []NMES []Other:  []w/US   []w/ice   [x]w/heat Position:supine Caryle Hdez []  Traction: [] Cervical       []Lumbar 
                     [] Prone          []Supine []Intermittent   []Continuous Lbs: 
[] before manual 
[] after manual 
[]w/heat  
 []  Ultrasound: []Continuous   [] Pulsed at: 
                         []1MHz   []3MHz Location: 
W/cm2:  
 [] Paraffin Location:  
[]w/heat  
 []  Ice     []  Heat 
[]  Ice massage Position: Location:  
 []  Laser 
[]  Other: Position: Location:  
 
 []  Vasopneumatic Device Pressure:       [] lo [] med [] hi  
Temperature:   
 
[x] Skin assessment post-treatment:  [x]intact []redness- no adverse reaction 
  []redness  adverse reaction:  
 
30 min Therapeutic Exercise:  [x] See flow sheet :  
Rationale: increase ROM, increase strength and improve coordination to improve the patients ability to perform lifting/reading/carrying 15 min Manual Therapy: SOD, UT stretching and TPR, manual traction, lateral glides Rationale: decrease pain, increase ROM, increase tissue extensibility, decrease trigger points and increase postural awareness to improve the patients ability to perform ADLs With 
 [] TE 
 [] TA 
 [] neuro 
 [] other: Patient Education: [x] Review HEP [] Progressed/Changed HEP based on:  
[] positioning   [] body mechanics   [] transfers   [] heat/ice application   
[] other:   
 
Other Objective/Functional Measures: TTP along nuchal line. Improved with manual therapy Pain Level (0-10 scale) post treatment: 0 
 
ASSESSMENT/Changes in Function:  
 
Patient will continue to benefit from skilled PT services to modify and progress therapeutic interventions, address functional mobility deficits, address ROM deficits, address strength deficits, analyze and address soft tissue restrictions, analyze and cue movement patterns, analyze and modify body mechanics/ergonomics and assess and modify postural abnormalities to attain remaining goals. []  See Plan of Care 
[]  See progress note/recertification 
[]  See Discharge Summary Progress towards goals / Updated goals: 
Patient is making steady progress towards strength and pain goals PLAN 
[]  Upgrade activities as tolerated     [x]  Continue plan of care 
[]  Update interventions per flow sheet      
[]  Discharge due to:_ 
[]  Other:_ Jayden Nicole, PT 9/26/2018

## 2018-10-01 ENCOUNTER — HOSPITAL ENCOUNTER (OUTPATIENT)
Dept: PHYSICAL THERAPY | Age: 68
Discharge: HOME | End: 2018-10-01
Payer: MEDICARE

## 2018-10-01 PROCEDURE — 97140 MANUAL THERAPY 1/> REGIONS: CPT | Performed by: PHYSICAL THERAPIST

## 2018-10-01 PROCEDURE — 97110 THERAPEUTIC EXERCISES: CPT | Performed by: PHYSICAL THERAPIST

## 2018-10-01 PROCEDURE — 97014 ELECTRIC STIMULATION THERAPY: CPT | Performed by: PHYSICAL THERAPIST

## 2018-10-01 NOTE — PROGRESS NOTES
PT DAILY TREATMENT NOTE - [facility] 2-15 Patient Name: Griffin  Date:10/1/2018 : 1950 [x]  Patient  Verified Payor: [place] MEDICARE / Plan: Melonie Arora / Product Type: Medicare / In time:1200  Out time:100 Total Treatment Time (min): 60 Total Timed Codes (min): 60 
1:1 Treatment Time ( [address] only): 60 Visit #: 7 Treatment Area: Neck pain [M54.2] SUBJECTIVE Pain Level (0-10 scale): 1/10 Any medication changes, allergies to medications, adverse drug reactions, diagnosis change, or new procedure performed?: [x] No    [] Yes (see summary sheet for update) Subjective functional status/changes:   [] No changes reported Patient reports that she is feeling better everyday. Has not had any back or neck pain since last week. OBJECTIVE Modality rationale: decrease pain and increase tissue extensibility to improve the patients ability to perform ADLs Min Type Additional Details 15 [] Estim: []Att   []Unatt        []TENS instruct [x]IFC  []Premod   []NMES []Other:  []w/US   []w/ice   [x]w/heat Position:supine Lawrnce Valerie []  Traction: [] Cervical       []Lumbar 
                     [] Prone          []Supine []Intermittent   []Continuous Lbs: 
[] before manual 
[] after manual 
[]w/heat  
 []  Ultrasound: []Continuous   [] Pulsed at: 
                         []1MHz   []3MHz Location: 
W/cm2:  
 [] Paraffin Location:  
[]w/heat  
 []  Ice     []  Heat 
[]  Ice massage Position: Location:  
 []  Laser 
[]  Other: Position: Location:  
 
 []  Vasopneumatic Device Pressure:       [] lo [] med [] hi  
Temperature:   
 
[x] Skin assessment post-treatment:  [x]intact []redness- no adverse reaction 
  []redness  adverse reaction:  
 
30 min Therapeutic Exercise:  [x] See flow sheet :  
Rationale: increase ROM, increase strength and improve coordination to improve the patients ability to perform lifting/reading/carrying 15 min Manual Therapy: SOD, UT stretching and TPR, manual traction, lateral glides Rationale: decrease pain, increase ROM, increase tissue extensibility, decrease trigger points and increase postural awareness to improve the patients ability to perform ADLs With 
 [] TE 
 [] TA 
 [] neuro 
 [] other: Patient Education: [x] Review HEP [] Progressed/Changed HEP based on:  
[] positioning   [] body mechanics   [] transfers   [] heat/ice application   
[] other:   
 
Other Objective/Functional Measures: Patient demonstrates good compliance and understanding of HEP and therex. Pain Level (0-10 scale) post treatment: 0 
 
ASSESSMENT/Changes in Function:  
 
Patient will continue to benefit from skilled PT services to modify and progress therapeutic interventions, address functional mobility deficits, address ROM deficits, address strength deficits, analyze and address soft tissue restrictions, analyze and cue movement patterns, analyze and modify body mechanics/ergonomics and assess and modify postural abnormalities to attain remaining goals. []  See Plan of Care 
[]  See progress note/recertification 
[]  See Discharge Summary Progress towards goals / Updated goals: 
Patient is making steady progress towards strength and pain goals PLAN 
[]  Upgrade activities as tolerated     [x]  Continue plan of care 
[]  Update interventions per flow sheet      
[]  Discharge due to:_ 
[]  Other:_ Dior Lucia, PT 10/1/2018

## 2018-10-04 DIAGNOSIS — I10 HTN, GOAL BELOW 130/80: ICD-10-CM

## 2018-10-04 RX ORDER — ATENOLOL 50 MG/1
TABLET ORAL
Qty: 180 TAB | Refills: 1 | Status: SHIPPED | OUTPATIENT
Start: 2018-10-04 | End: 2018-11-15 | Stop reason: SDUPTHER

## 2018-10-05 ENCOUNTER — HOSPITAL ENCOUNTER (OUTPATIENT)
Dept: PHYSICAL THERAPY | Age: 68
Discharge: HOME | End: 2018-10-05
Payer: MEDICARE

## 2018-10-05 PROCEDURE — 97110 THERAPEUTIC EXERCISES: CPT | Performed by: PHYSICAL THERAPIST

## 2018-10-05 PROCEDURE — 97140 MANUAL THERAPY 1/> REGIONS: CPT | Performed by: PHYSICAL THERAPIST

## 2018-10-05 NOTE — PROGRESS NOTES
PT DAILY TREATMENT NOTE - [facility] 2-15 Patient Name: Yulisa Ulrich Date:10/5/2018 : 1950 [x]  Patient  Verified Payor: [place] MEDICARE / Plan: Melonie Gauthier y / Product Type: Medicare / In time:1000  Out time:1030 Total Treatment Time (min): 60 Total Timed Codes (min): 60 
1:1 Treatment Time ( [address] only): 60 Visit #: 4 Treatment Area: Neck pain [M54.2] SUBJECTIVE Pain Level (0-10 scale): 1/10 Any medication changes, allergies to medications, adverse drug reactions, diagnosis change, or new procedure performed?: [x] No    [] Yes (see summary sheet for update) Subjective functional status/changes:   [] No changes reported Patient reports that her low back feels good. She has some soreness at the base of neck. OBJECTIVE Modality rationale: decrease pain and increase tissue extensibility to improve the patients ability to perform ADLs Min Type Additional Details 15 [] Estim: []Att   []Unatt        []TENS instruct [x]IFC  []Premod   []NMES []Other:  []w/US   []w/ice   [x]w/heat Position:supine Lefty Sidhu []  Traction: [] Cervical       []Lumbar 
                     [] Prone          []Supine []Intermittent   []Continuous Lbs: 
[] before manual 
[] after manual 
[]w/heat  
 []  Ultrasound: []Continuous   [] Pulsed at: 
                         []1MHz   []3MHz Location: 
W/cm2:  
 [] Paraffin Location:  
[]w/heat  
 []  Ice     []  Heat 
[]  Ice massage Position: Location:  
 []  Laser 
[]  Other: Position: Location:  
 
 []  Vasopneumatic Device Pressure:       [] lo [] med [] hi  
Temperature:   
 
[x] Skin assessment post-treatment:  [x]intact []redness- no adverse reaction 
  []redness  adverse reaction:  
 
30 min Therapeutic Exercise:  [x] See flow sheet :  
Rationale: increase ROM, increase strength and improve coordination to improve the patients ability to perform lifting/reading/carrying 15 min Manual Therapy: SOD, UT stretching and TPR, manual traction, lateral glides Rationale: decrease pain, increase ROM, increase tissue extensibility, decrease trigger points and increase postural awareness to improve the patients ability to perform ADLs With 
 [] TE 
 [] TA 
 [] neuro 
 [] other: Patient Education: [x] Review HEP [] Progressed/Changed HEP based on:  
[] positioning   [] body mechanics   [] transfers   [] heat/ice application   
[] other:   
 
Other Objective/Functional Measures: Patient demonstrates good compliance and understanding of HEP and therex. Pain resolved s/p manual therapy Pain Level (0-10 scale) post treatment: 0 
 
ASSESSMENT/Changes in Function:  
 
Patient will continue to benefit from skilled PT services to modify and progress therapeutic interventions, address functional mobility deficits, address ROM deficits, address strength deficits, analyze and address soft tissue restrictions, analyze and cue movement patterns, analyze and modify body mechanics/ergonomics and assess and modify postural abnormalities to attain remaining goals. []  See Plan of Care 
[]  See progress note/recertification 
[]  See Discharge Summary Progress towards goals / Updated goals: 
Patient is making steady progress towards strength and pain goals PLAN 
[]  Upgrade activities as tolerated     [x]  Continue plan of care 
[]  Update interventions per flow sheet      
[]  Discharge due to:_ 
[]  Other:_ Lew Flores, PT 10/5/2018

## 2018-10-08 ENCOUNTER — HOSPITAL ENCOUNTER (OUTPATIENT)
Dept: PHYSICAL THERAPY | Age: 68
Discharge: HOME | End: 2018-10-08
Payer: MEDICARE

## 2018-10-08 PROCEDURE — 97014 ELECTRIC STIMULATION THERAPY: CPT | Performed by: PHYSICAL THERAPIST

## 2018-10-08 PROCEDURE — 97140 MANUAL THERAPY 1/> REGIONS: CPT | Performed by: PHYSICAL THERAPIST

## 2018-10-08 PROCEDURE — 97110 THERAPEUTIC EXERCISES: CPT | Performed by: PHYSICAL THERAPIST

## 2018-10-08 NOTE — PROGRESS NOTES
PT DAILY TREATMENT NOTE - [facility] 2-15 Patient Name: Tr Cosme Date:10/8/2018 : 1950 [x]  Patient  Verified Payor: [place] MEDICARE / Plan: Melonie Pruitty / Product Type: Medicare / In time:1230  Out time:130 Total Treatment Time (min): 60 Total Timed Codes (min): 30 
1:1 Treatment Time ( only): 30 Visit #: 7 Treatment Area: Neck pain [M54.2] SUBJECTIVE Pain Level (0-10 scale): 1/10 Any medication changes, allergies to medications, adverse drug reactions, diagnosis change, or new procedure performed?: [x] No    [] Yes (see summary sheet for update) Subjective functional status/changes:   [] No changes reported Patient reports that her low back feels good. She has some soreness at the base of neck. OBJECTIVE Modality rationale: decrease pain and increase tissue extensibility to improve the patients ability to perform ADLs Min Type Additional Details 15 [] Estim: []Att   []Unatt        []TENS instruct [x]IFC  []Premod   []NMES []Other:  []w/US   []w/ice   [x]w/heat Position:supine [place] []  Traction: [] Cervical       []Lumbar 
                     [] Prone          []Supine []Intermittent   []Continuous Lbs: 
[] before manual 
[] after manual 
[]w/heat  
 []  Ultrasound: []Continuous   [] Pulsed at: 
                         []1MHz   []3MHz Location: 
W/cm2:  
 [] Paraffin Location:  
[]w/heat  
 []  Ice     []  Heat 
[]  Ice massage Position: Location:  
 []  Laser 
[]  Other: Position: Location:  
 
 []  Vasopneumatic Device Pressure:       [] lo [] med [] hi  
Temperature:   
 
[x] Skin assessment post-treatment:  [x]intact []redness- no adverse reaction 
  []redness  adverse reaction:  
 
30 min Therapeutic Exercise:  [x] See flow sheet :progression with JESS inhibition Rationale: increase ROM, increase strength and improve coordination to improve the patients ability to perform lifting/reading/carrying 15 min Manual Therapy: Rib pumping, diaphragm mobilization using AIC technique, followed by:SOD, UT stretching and TPR, manual traction, lateral glides Rationale: decrease pain, increase ROM, increase tissue extensibility, decrease trigger points and increase postural awareness to improve the patients ability to perform ADLs With 
 [] TE 
 [] TA 
 [] neuro 
 [] other: Patient Education: [x] Review HEP [] Progressed/Changed HEP based on:  
[] positioning   [] body mechanics   [] transfers   [] heat/ice application   
[] other:   
 
Other Objective/Functional Measures: Patient progressed with JESS posterior inhibition exercises Pain Level (0-10 scale) post treatment: 0 
 
ASSESSMENT/Changes in Function:  
 
Patient will continue to benefit from skilled PT services to modify and progress therapeutic interventions, address functional mobility deficits, address ROM deficits, address strength deficits, analyze and address soft tissue restrictions, analyze and cue movement patterns, analyze and modify body mechanics/ergonomics and assess and modify postural abnormalities to attain remaining goals. []  See Plan of Care 
[]  See progress note/recertification 
[]  See Discharge Summary Progress towards goals / Updated goals: 
Patient is making steady progress towards strength and pain [id number] goals next visit PLAN 
[]  Upgrade activities as tolerated     [x]  Continue plan of care 
[]  Update interventions per flow sheet      
[]  Discharge due to:_ 
[]  Other:_ Mary Suazo, PT 10/8/2018

## 2018-10-27 RX ORDER — IRBESARTAN 300 MG/1
TABLET ORAL
Qty: 30 TAB | Refills: 2 | Status: SHIPPED | OUTPATIENT
Start: 2018-10-27 | End: 2018-11-15 | Stop reason: SDUPTHER

## 2018-11-01 ENCOUNTER — HOSPITAL ENCOUNTER (OUTPATIENT)
Dept: MAMMOGRAPHY | Age: 68
Discharge: HOME | End: 2018-11-01
Attending: INTERNAL MEDICINE
Payer: MEDICARE

## 2018-11-01 DIAGNOSIS — Z12.31 VISIT FOR SCREENING MAMMOGRAM: ICD-10-CM

## 2018-11-01 PROCEDURE — 77067 SCR MAMMO BI INCL CAD: CPT

## 2018-11-02 ENCOUNTER — APPOINTMENT (OUTPATIENT)
Dept: PHYSICAL THERAPY | Age: 68
End: 2018-11-02
Payer: MEDICARE

## 2018-11-05 ENCOUNTER — APPOINTMENT (OUTPATIENT)
Dept: PHYSICAL THERAPY | Age: 68
End: 2018-11-05
Payer: MEDICARE

## 2018-11-08 ENCOUNTER — OFFICE VISIT (OUTPATIENT)
Dept: INTERNAL MEDICINE CLINIC | Age: 68
End: 2018-11-08

## 2018-11-08 ENCOUNTER — HOSPITAL ENCOUNTER (OUTPATIENT)
Dept: LAB | Age: 68
Discharge: HOME | End: 2018-11-08
Payer: MEDICARE

## 2018-11-08 ENCOUNTER — HOSPITAL ENCOUNTER (OUTPATIENT)
Dept: GENERAL RADIOLOGY | Age: 68
Discharge: HOME | End: 2018-11-08
Attending: INTERNAL MEDICINE
Payer: MEDICARE

## 2018-11-08 VITALS
SYSTOLIC BLOOD PRESSURE: 124 MMHG | DIASTOLIC BLOOD PRESSURE: 68 MMHG | RESPIRATION RATE: 16 BRPM | WEIGHT: 118 LBS | OXYGEN SATURATION: 96 % | TEMPERATURE: 97.9 F | HEIGHT: 63 IN | BODY MASS INDEX: 20.91 KG/M2 | HEART RATE: 60 BPM

## 2018-11-08 DIAGNOSIS — R05.9 COUGH: ICD-10-CM

## 2018-11-08 DIAGNOSIS — R59.9 ADENOPATHY: ICD-10-CM

## 2018-11-08 DIAGNOSIS — I95.89 OTHER SPECIFIED HYPOTENSION: Primary | ICD-10-CM

## 2018-11-08 PROCEDURE — 85025 COMPLETE CBC W/AUTO DIFF WBC: CPT

## 2018-11-08 PROCEDURE — 36415 COLL VENOUS BLD VENIPUNCTURE: CPT

## 2018-11-08 PROCEDURE — 83615 LACTATE (LD) (LDH) ENZYME: CPT

## 2018-11-08 PROCEDURE — 71046 X-RAY EXAM CHEST 2 VIEWS: CPT

## 2018-11-08 PROCEDURE — 80053 COMPREHEN METABOLIC PANEL: CPT

## 2018-11-08 RX ORDER — AMOXICILLIN AND CLAVULANATE POTASSIUM 875; 125 MG/1; MG/1
TABLET, FILM COATED ORAL EVERY 12 HOURS
COMMUNITY
End: 2018-11-15 | Stop reason: ALTCHOICE

## 2018-11-08 NOTE — PROGRESS NOTES
HISTORY OF PRESENT ILLNESS 
Dang Smith is a 80 y.o. female. HPI Seen with hypotension. She got sick initially with cough, congestion while in [place] around October 12th. Symptoms persisted during travel to [facility]. Last weekend she noticed a very swollen lymph node left side of neck, size of an orange. She was started on Augmentin 875 mg b.i.d. this past Saturday and swelling has dramatically decreased in size. Her cough has improved. She is not having fevers. She does not have pain with swallowing. She is not short of breath. She does feel very tired and weak. She is not having ear pain or sore throat. She took her blood pressure at home and earlier today it was 112/66, then 89/61, then 136/69. She has had some stool frequency with Augmentin, although it is not watery or bloody. She has just held her Hydrochlorothiazide recently. Review of Systems Constitutional: Positive for malaise/fatigue. Negative for chills, diaphoresis and fever. HENT: Negative for congestion, ear discharge, ear pain, nosebleeds and sore throat. Eyes: Negative for pain and discharge. Respiratory: Positive for cough. Negative for hemoptysis, sputum production, shortness of breath and wheezing. Cardiovascular: Negative for chest pain. Gastrointestinal: Positive for diarrhea. Negative for abdominal pain and blood in stool. Musculoskeletal: Negative for myalgias. Skin: Negative for rash. Neurological: Negative for headaches. Physical Exam  
Constitutional: She appears well-developed and well-nourished. HENT:  
Head: Normocephalic. Right Ear: External ear normal.  
Left Ear: External ear normal.  
Nose: Nose normal.  
Mouth/Throat: Oropharynx is clear and moist and mucous membranes are normal. No oropharyngeal exudate. tms blocked by wax bilaterally Eyes: Conjunctivae are normal. Pupils are equal, round, and reactive to light. Right eye exhibits no discharge. Left eye exhibits no discharge. Neck: Normal range of motion. Neck supple. Cardiovascular: Normal rate, regular rhythm and normal heart sounds. Pulmonary/Chest: Effort normal and breath sounds normal. No respiratory distress. She has no wheezes. She has no rales. Musculoskeletal: She exhibits no edema. Lymphadenopathy:  
  She has cervical adenopathy (left side of neck below angle of jaw tender 3 by 2.5 cm node no other cervical nodes palpable). Neurological: She is alert. Skin: Skin is warm and dry. Nursing note and vitals reviewed. ASSESSMENT and PLAN Diagnoses and all orders for this visit: 
 
1. Other specified hypotension-I am not sure if this is from recent illness and diarrhea She is on augmentin and lymph node improved dramatically so will not stop abx and will inc fluids and hold bp meds for next week 
bp and hr and sats here are good so no sign of sepsis now 2. Cough -     XR CHEST PA LAT; Future 3. Adenopathy 
-     XR CHEST PA LAT; Future -     METABOLIC PANEL, COMPREHENSIVE 
-     CBC WITH AUTOMATED DIFF 
-     LD 
 
 
the following changes in treatment are made: hold hctz and avapro and appt next week for reval 
Seek care sooner if fevers or sob or persistent hypotension

## 2018-11-09 ENCOUNTER — APPOINTMENT (OUTPATIENT)
Dept: PHYSICAL THERAPY | Age: 68
End: 2018-11-09
Payer: MEDICARE

## 2018-11-09 LAB
ALBUMIN SERPL-MCNC: 4.2 G/DL (ref 3.6–4.8)
ALBUMIN/GLOB SERPL: 1.7 {RATIO} (ref 1.2–2.2)
ALP SERPL-CCNC: 74 IU/L (ref 39–117)
ALT SERPL-CCNC: 35 IU/L (ref 0–32)
AST SERPL-CCNC: 72 IU/L (ref 0–40)
BASOPHILS # BLD AUTO: 0.1 X10E3/UL (ref 0–0.2)
BASOPHILS NFR BLD AUTO: 1 %
BILIRUB SERPL-MCNC: 0.2 MG/DL (ref 0–1.2)
BUN SERPL-MCNC: 20 MG/DL (ref 8–27)
BUN/CREAT SERPL: 31 (ref 12–28)
CALCIUM SERPL-MCNC: 10 MG/DL (ref 8.7–10.3)
CHLORIDE SERPL-SCNC: 92 MMOL/L (ref 96–106)
CO2 SERPL-SCNC: 30 MMOL/L (ref 20–29)
CREAT SERPL-MCNC: 0.65 MG/DL (ref 0.57–1)
EOSINOPHIL # BLD AUTO: 0.3 X10E3/UL (ref 0–0.4)
EOSINOPHIL NFR BLD AUTO: 4 %
ERYTHROCYTE [DISTWIDTH] IN BLOOD BY AUTOMATED COUNT: 13.2 % (ref 12.3–15.4)
GLOBULIN SER CALC-MCNC: 2.5 G/DL (ref 1.5–4.5)
GLUCOSE SERPL-MCNC: 99 MG/DL (ref 65–99)
HCT VFR BLD AUTO: 34 % (ref 34–46.6)
HGB BLD-MCNC: 11.3 G/DL (ref 11.1–15.9)
IMM GRANULOCYTES # BLD: 0 X10E3/UL (ref 0–0.1)
IMM GRANULOCYTES NFR BLD: 1 %
LDH SERPL-CCNC: 168 IU/L (ref 119–226)
LYMPHOCYTES # BLD AUTO: 2.5 X10E3/UL (ref 0.7–3.1)
LYMPHOCYTES NFR BLD AUTO: 28 %
MCH RBC QN AUTO: 32.8 PG (ref 26.6–33)
MCHC RBC AUTO-ENTMCNC: 33.2 G/DL (ref 31.5–35.7)
MCV RBC AUTO: 99 FL (ref 79–97)
MONOCYTES # BLD AUTO: 0.5 X10E3/UL (ref 0.1–0.9)
MONOCYTES NFR BLD AUTO: 6 %
NEUTROPHILS # BLD AUTO: 5.2 X10E3/UL (ref 1.4–7)
NEUTROPHILS NFR BLD AUTO: 60 %
PLATELET # BLD AUTO: 334 X10E3/UL (ref 150–379)
POTASSIUM SERPL-SCNC: 5 MMOL/L (ref 3.5–5.2)
PROT SERPL-MCNC: 6.7 G/DL (ref 6–8.5)
RBC # BLD AUTO: 3.44 X10E6/UL (ref 3.77–5.28)
SODIUM SERPL-SCNC: 136 MMOL/L (ref 134–144)
WBC # BLD AUTO: 8.7 X10E3/UL (ref 3.4–10.8)

## 2018-11-12 ENCOUNTER — APPOINTMENT (OUTPATIENT)
Dept: PHYSICAL THERAPY | Age: 68
End: 2018-11-12
Payer: MEDICARE

## 2018-11-14 ENCOUNTER — HOSPITAL ENCOUNTER (OUTPATIENT)
Dept: PHYSICAL THERAPY | Age: 68
Discharge: HOME | End: 2018-11-14
Payer: MEDICARE

## 2018-11-14 PROCEDURE — 97110 THERAPEUTIC EXERCISES: CPT | Performed by: PHYSICAL THERAPIST

## 2018-11-14 PROCEDURE — 97140 MANUAL THERAPY 1/> REGIONS: CPT | Performed by: PHYSICAL THERAPIST

## 2018-11-14 NOTE — PROGRESS NOTES
PT DAILY TREATMENT NOTE - [facility] 2-15 Patient Name: Kyle Hollingsworth Date:2018 : 1950 [x]  Patient  Verified Payor: [place] MEDICARE / Plan: Melonie Gauthier y / Product Type: Medicare / In time:1200  Out time:1245 Total Treatment Time (min): 45 Total Timed Codes (min): 30 
1:1 Treatment Time ( only): 30 Visit #: 75 Treatment Area: Neck pain [M54.2] SUBJECTIVE Pain Level (0-10 scale): 1/10 Any medication changes, allergies to medications, adverse drug reactions, diagnosis change, or new procedure performed?: [x] No    [] Yes (see summary sheet for update) Subjective functional status/changes:   [] No changes reported Patient reports that her neck has felt good for weeks. No problems while traveling overseas. She has some soreness from time to time in her shoulder blade and would like to review her HEP for Sx management. OBJECTIVE 30 min Therapeutic Exercise:  [x] See flow sheet :progression with JESS inhibition Rationale: increase ROM, increase strength and improve coordination to improve the patients ability to perform lifting/reading/carrying 15 min Manual Therapy: Rib pumping, diaphragm mobilization using AIC technique, followed by:SOD, UT stretching and TPR, manual traction, lateral glides Rationale: decrease pain, increase ROM, increase tissue extensibility, decrease trigger points and increase postural awareness to improve the patients ability to perform ADLs With 
 [] TE 
 [] TA 
 [] neuro 
 [] other: Patient Education: [x] Review HEP [] Progressed/Changed HEP based on:  
[] positioning   [] body mechanics   [] transfers   [] heat/ice application   
[] other:   
 
Other Objective/Functional Measures: 
Patient is pain free with all Cervical ROM, strength WNL Pain Level (0-10 scale) post treatment: 0 
 
ASSESSMENT/Changes in Function:  
 
Patient will continue to benefit from skilled PT services to modify and progress therapeutic interventions, address functional mobility deficits, address ROM deficits, address strength deficits, analyze and address soft tissue restrictions, analyze and cue movement patterns, analyze and modify body mechanics/ergonomics and assess and modify postural abnormalities to attain remaining goals. []  See Plan of Care 
[]  See progress note/recertification 
[]  See Discharge Summary Progress towards goals / Updated goals: 
Short Term Goals: To be accomplished in 6 weeks:MET To demonstrate compliance with HEP To increase cervical ROM by 10 degrees so patient can turn head to answer phone Decrease pain to <4/10 so that patient can knit for 20 minutes Improve cervical strength to 4/5 so patient can hold ipad to read 20 minutes 
  
Long Term Goals: To be accomplished in 12 weeks:MET Decrease overall pain to <2/10 so patient can sleep through night Improve cervical and shoulder strength to 5/5 so patient can lift purse without pain Demonstrate compliance with ergonomic sitting to read/knit Increase cervical ROM to [facility] so patient can turn head while driving PLAN 
[]  Upgrade activities as tolerated     [x]  Continue plan of care 
[]  Update interventions per flow sheet [x]  Discharge due to:_goals met 
[]  Other:_ Jasons Sheryl, PT 11/14/2018

## 2018-11-15 ENCOUNTER — OFFICE VISIT (OUTPATIENT)
Dept: INTERNAL MEDICINE CLINIC | Age: 68
End: 2018-11-15

## 2018-11-15 VITALS
HEIGHT: 63 IN | OXYGEN SATURATION: 98 % | DIASTOLIC BLOOD PRESSURE: 74 MMHG | HEART RATE: 67 BPM | BODY MASS INDEX: 21.26 KG/M2 | TEMPERATURE: 97.6 F | WEIGHT: 120 LBS | RESPIRATION RATE: 16 BRPM | SYSTOLIC BLOOD PRESSURE: 131 MMHG

## 2018-11-15 DIAGNOSIS — Z00.00 MEDICARE ANNUAL WELLNESS VISIT, SUBSEQUENT: ICD-10-CM

## 2018-11-15 DIAGNOSIS — H61.22 IMPACTED CERUMEN OF LEFT EAR: ICD-10-CM

## 2018-11-15 DIAGNOSIS — R59.0 CERVICAL ADENOPATHY: ICD-10-CM

## 2018-11-15 DIAGNOSIS — I10 HTN, GOAL BELOW 130/80: Primary | ICD-10-CM

## 2018-11-15 RX ORDER — ATENOLOL 50 MG/1
50 TABLET ORAL DAILY
Qty: 180 TAB | Refills: 1
Start: 2018-11-15 | End: 2020-01-14 | Stop reason: SDUPTHER

## 2018-11-15 RX ORDER — IRBESARTAN 300 MG/1
TABLET ORAL
Qty: 30 TAB | Refills: 2
Start: 2018-11-15 | End: 2019-03-01

## 2018-11-15 NOTE — PROGRESS NOTES
HISTORY OF PRESENT ILLNESS 
Kyle Hollingsworth is a 80 y.o. female. HPIa Seen at follow up after recent illness. She feels 100% better. Her left cervical node continues to decrease. Appetite has come back and she is not having fevers or chills. We dropped her blood pressure regimen because of hypotension. She is only on 50 of Atenolol once a day. Pressure is creeping back up, so will add a half of an avapro 300 mg. Continue Atenolol 50 a day. Because she became so hypotensive with this illness I would like to screen for Edilberto's. She will have a fasting cortisol level. Continues to have cerumen impaction on the left and will try to irrigate. Review of Systems Constitutional: Negative. Negative for chills, diaphoresis, fever and malaise/fatigue. HENT: Positive for hearing loss. Negative for congestion, ear discharge, ear pain, nosebleeds and sore throat. Eyes: Negative for pain and discharge. Respiratory: Negative for cough, hemoptysis, sputum production, shortness of breath and wheezing. Cardiovascular: Negative for chest pain. Gastrointestinal: Negative for abdominal pain. Neurological: Negative for headaches. Physical Exam  
Constitutional: She is oriented to person, place, and time. She appears well-developed and well-nourished. HENT:  
Head: Normocephalic and atraumatic. Mouth/Throat: Oropharynx is clear and moist.  
Cerumen left canal  
Neck: Normal range of motion. Neck supple. Carotid bruit is not present. No thyromegaly present. Cardiovascular: Normal rate, regular rhythm, S1 normal, S2 normal, normal heart sounds and intact distal pulses. No murmur heard. Pulmonary/Chest: Effort normal and breath sounds normal. No respiratory distress. She has no wheezes. She has no rales. Musculoskeletal: She exhibits no edema. Lymphadenopathy:  
  She has cervical adenopathy (small node left ant cervical). Neurological: She is alert and oriented to person, place, and time. Psychiatric: She has a normal mood and affect. Her behavior is normal.  
Nursing note and vitals reviewed. ASSESSMENT and PLAN Diagnoses and all orders for this visit: 1. HTN, goal below 130/80-resuem 1/2  avapro and if still up can titrate up to 1 tab in 2 weeks cont atenolol as only 50 mg in pm 
-     irbesartan (AVAPRO) 300 mg tablet; 1/2 po qd 
-     atenolol (TENORMIN) 50 mg tablet; Take 1 Tab by mouth daily. 2. Impacted cerumen of left ear-irrigated today 3. Cervical adenopathy-? Adrenal insuff with dec in bp when ill-doing better now will check am cortisol 
-     CORTISOL, AM 
 
4. Medicare annual wellness visit, subsequent Agree with notes per David Hsieh

## 2018-11-15 NOTE — PROGRESS NOTES
Dr. Elizabeth Engle referred Kyle Hollingsworth, 1950, a 80 y.o. female for a Medicare Annual Wellness Visit (AWV) This is the Subsequent Medicare Annual Wellness Exam, performed 12 months or more after the Initial AWV or the last Subsequent AWV I have reviewed the patient's medical history in detail and updated the computerized patient record. History Past Medical History:  
Diagnosis Date  
 HTN, goal below 130/80 2015  Hx of bone density study 2016 Osteopenic  Hx of mammogram 07/10/2017 Negtaive  Impaired glucose tolerance 2015  Osteopenia 2015  Routine Papanicolaou smear 2018 Neg pap Past Surgical History:  
Procedure Laterality Date [address]  HX  SECTION    
 HX COLONOSCOPY    HX ORTHOPAEDIC  13  
 right foot Current Outpatient Medications Medication Sig Dispense Refill  irbesartan (AVAPRO) 300 mg tablet 1/2 po qd 30 Tab 2  
 atenolol (TENORMIN) 50 mg tablet Take 1 Tab by mouth daily. 180 Tab 1  
 lovastatin (MEVACOR) 20 mg tablet TAKE 1 TABLET BY MOUTH NIGHTLY 90 Tab 1  
 omega-3 fatty acids-vitamin e 1,000 mg cap Take 2 Caps by mouth.  Cholecalciferol, Vitamin D3, (VITAMIN D3) 2,000 unit cap capsule Take 2,000 Units by mouth daily. 30 Cap 5  
 multivitamin, tx-iron-ca-min (THERA-M W/ IRON) 9 mg iron-400 mcg tab tablet Take 1 Tab by mouth daily.  OTHER     
 aspirin 81 mg chewable tablet Take 81 mg by mouth daily.  nystatin (MYCOSTATIN) powder Bid prn 30 g 2 Allergies Allergen Reactions  Shellfish Derived Diarrhea and Nausea and Vomiting Family History Problem Relation Age of Onset  Hypertension Father  Hypertension Sister Social History Tobacco Use  Smoking status: Former Smoker Years: 15.00 Last attempt to quit: 2012 Years since quittin.2  Smokeless tobacco: Never Used Substance Use Topics  Alcohol use: Yes Alcohol/week: 0.0 oz Patient Active Problem List  
Diagnosis Code  FHx: AAA [id number]  
 HTN, goal below 130/80 I10  Dyslipidemia E78.5  Impaired glucose tolerance R73.02  
 NAFLD (nonalcoholic fatty liver disease) K76.0  
 Osteopenia M85.80  Pyuria N39.0 Depression Risk Factor Screening: PHQ over the last two weeks 11/15/2018 Little interest or pleasure in doing things Not at all Feeling down, depressed, irritable, or hopeless Not at all Total Score PHQ 2 0 Alcohol Risk Factor Screening: You do not drink alcohol or very rarely. Functional Ability and Level of Safety:  
Hearing Loss Hearing is good. Has had hearing test done in the past. Her hearing was below normal but expected for her age and not severe enough for any assistive devices. Activities of Daily Living The home contains: no safety equipment. Patient does total self care ADL Assessment 11/15/2018 Feeding yourself No Help Needed Getting from bed to chair No Help Needed Getting dressed No Help Needed Bathing or showering No Help Needed Walk across the room (includes cane/walker) No Help Needed Using the telphone No Help Needed Taking your medications No Help Needed Preparing meals No Help Needed Managing money (expenses/bills) No Help Needed Moderately strenuous housework (laundry) No Help Needed Shopping for personal items (toiletries/medicines) No Help Needed Shopping for groceries No Help Needed Driving No Help Needed Climbing a flight of stairs No Help Needed Getting to places beyond walking distances No Help Needed Fall Risk Fall Risk Assessment, last 12 mths 11/15/2018 Able to walk? Yes Fall in past 12 months? No  
 
 
Abuse Screen Patient is not abused Abuse Screening Questionnaire 11/15/2018 Do you ever feel afraid of your partner? Maya Angel Are you in a relationship with someone who physically or mentally threatens you?  Maya Angel  
 Is it safe for you to go home? [place] [facility] Cognitive Screening Evaluation of Cognitive Function: 
Has your family/caregiver stated any concerns about your memory: no 
Normal 
 
Patient Care Team  
Patient Care Team: 
Abran Avendaño MD as PCP - General (Internal Medicine) Dionne Pedroza RN as Ambulatory Care Navigator Assessment/Plan Education and counseling provided: 
Are appropriate based on today's review and evaluation End-of-Life planning (with patient's consent) Pneumococcal Vaccine Influenza Vaccine Hepatitis B Vaccine Screening Mammography Screening Pap and pelvic (covered once every 2 years) Colorectal cancer screening tests Cardiovascular screening blood test 
Bone mass measurement (DEXA) Screening for glaucoma Diabetes screening test 
Tdap and Shingrix Diagnoses and all orders for this visit: 1. HTN, goal below 130/80 
-     irbesartan (AVAPRO) 300 mg tablet; 1/2 po qd 
-     atenolol (TENORMIN) 50 mg tablet; Take 1 Tab by mouth daily. 2. Impacted cerumen of left ear 3. Cervical adenopathy 
-     CORTISOL, AM 
 
4. Medicare annual wellness visit, subsequent Health Maintenance Due Topic Date Due  Shingrix Vaccine Age 50> (1 of 2) 01/01/2001  GLAUCOMA SCREENING Q2Y  01/01/2016 Riya Santa Medicare Annual Wellness Visit: 
----Immunizations: Vaccine information updated in table in patient instructions. Patient confirmed the following records of vaccinations are correct and current. Immunization History Administered Date(s) Administered  Influenza High Dose Vaccine PF 10/06/2017, 09/03/2018  Pneumococcal Conjugate (PCV-13) 11/09/2015  Pneumococcal Polysaccharide (PPSV-23) 06/09/2011, 09/06/2018  Tdap 06/09/2011  Zoster Vaccine, Live 06/17/2013 Patient inquires about Shingrix vaccine. Educated her about the new vaccine, availability, and administration schedule. She will check with [facility] to see if they have it in stock. ----Screenings: See chart in patient instructions for specific information. ADL's, Fall Risk, Depression Screening and Abuse screening information is reported above. Patient is up to date on colonoscopy, mammogram, pap, dexa and lab work. Could consider a repeat dexa in the future if indicated. Patient went to [facility] for her last eye exam and inquires about glaucoma screening.  
 
----Medication Reconciliation was performed today and the following changes were made: none 
 
----Advanced Care Plan: on file, no changes needed. Patient verbalized understanding of information presented. Answered all of the patient's questions. AVS handed and reviewed with patient which includes a Medicare Wellness Preventative Screening Table and patient specific information. Notification of recommendations will be sent to Dr. Cam Pierre for review.  
 
Susan Cancino, PharmD, BCPS, CDE

## 2018-11-15 NOTE — PATIENT INSTRUCTIONS
Medicare Part B Preventive Services Limitations Recommendation Scheduled Bone Mass Measurement 
(age 72 & older, biennial) Requires diagnosis related to osteoporosis or estrogen deficiency. Biennial benefit unless patient has history of long-term glucocorticoid tx or baseline is needed because initial test was by other method Last: 6/8/2016 A DEXA scan is recommended after you turn 72years of age to determine your risk for osteoporosis Next: As recommended by your physician Colorectal Cancer Screening 
-Fecal occult blood test (annual) -Flexible sigmoidoscopy (5y) 
-Screening colonoscopy (10y) -Barium Enema  Last: 6/2011 Every 5-10 years depending on your colonoscopy result starting at age 48 years Next: 2021 or as recommended by your physician Glaucoma Screening (no USPSTF recommendation) Diabetes mellitus, family history, , age 48 or over,  American, age 72 or over Last: gets eye exams at [facility] Every year Consider getting a glaucoma screening Screening Mammography (biennial age 54-69) Annually (age 36 or over) Last: 11/2018 Next: 11/2019 Screening Pap Tests and Pelvic Examination (up to age 72 and after 72 if unknown history or abnormal study last 10 years) Every 24 months except high risk Last: 7/2018 Next: 
 
Discuss with your doctor if this screening is appropriate for you. Cardiovascular Screening Blood Tests (every 5 years) Total cholesterol, HDL, Triglycerides Order as a panel if possible Last: 4/2018 Every Year Next: 4/2019 or as recommended Diabetes Screening Tests (at least every 3 years, Medicare covers annually or at 6-month intervals for prediabetic patients) Fasting blood sugar (FBS) or glucose tolerance test (GTT) Patient must be diagnosed with one of the following: 
-Hypertension, Dyslipidemia, obesity, previous impaired FBS or GTT 
Or any two of the following: overweight, FH of diabetes, age ? 65, history of gestational diabetes, birth of baby weighing more than 9 pounds Last: 4/2018 Every 3-6 months depending on your result Every 3 years Next: Check with yearly labs or as recommended Diabetes Self-Management Training (DSMT) (no USPSTF recommendation) Requires referral by treating physician for patient with diabetes or renal disease. 10 hours of initial DSMT session of no less than 30 minutes each in a continuous 12-month period. 2 hours of follow-up DSMT in subsequent years. Last: NA Talk to your doctor if you are interested in a refresher course. Medical Nutrition Therapy (MNT) (for diabetes or renal disease not recommended schedule) Requires referral by treating physician for patient with diabetes or renal disease. Can be provided in same year as diabetes self-management training (DSMT), and CMS recommends medical nutrition therapy take place after DSMT. Up to 3 hours for initial year and 2 hours in subsequent years. Last: NA Talk to your doctor if you are interested in a refresher course. Seasonal Influenza Vaccination (annually)  Last: 9/2018 Every Fall Fall 2019 Pneumococcal Vaccination (once after 65)  Last: 
Pneumovax: 
9/2018 Prevnar-13: 
11/2015 Complete Shingles Vaccination  Last:  
 
Zostavax 6/2013 A shingles vaccine is recommended once in a lifetime after age 61 Please consider getting a Shingrix vaccine series at your local pharmacy Tetanus, Diphtheria and Pertussis (Tdap) Vaccination Booster One Booster as an adult and then tetanus every 10 years or as indicated Last:6/2011 2021 or as indicated Hepatitis B Vaccinations (if medium/high risk) Medium/high risk factors:  End-stage renal disease, Hemophiliacs who received Factor VIII or IX concentrates, Clients of institutions for the mentally retarded, Persons who live in the same house as a HepB virus carrier, Homosexual men, Illicit injectable drug abusers.  Last: NA Next: NA  
 Counseling to Prevent Tobacco Use (up to 8 sessions per year) - Counseling greater than 3 and up to 10 minutes - Counseling greater than 10 minutes Patients must be asymptomatic of tobacco-related conditions to receive as preventive service Last: NA Next: NA Human Immunodeficiency Virus (HIV) Screening (annually for increased risk patients) HIV-1 and HIV-2 by EIA, MIKEY, rapid antibody test, or oral mucosa transudate Patient must be at increased risk for HIV infection per USPSTF guidelines or pregnant. Tests covered annually for patients at increased risk. Pregnant patients may receive up to 3 test during pregnancy. Last: NA Next: NA Ultrasound Screening for Abdominal Aortic Aneurysm (AAA) once Patient must be referred through IPPE and not have had a screening for abdominal aortic aneurysm before under medicare. Limited to patients who meet onf of the following criteria: 
-Men who are 73-68 years old and have smoked more than 100 cigarettes in their lifetime 
-Anyone with a FH of AAA 
-Anyone recommended for screening by the USPSFTF As recommended by your PCP or Specialist 
 As recommended by your PCP or Specialist 
  
NA = Not Applicable; NI= Not Indicated

## 2018-11-19 ENCOUNTER — APPOINTMENT (OUTPATIENT)
Dept: PHYSICAL THERAPY | Age: 68
End: 2018-11-19
Payer: MEDICARE

## 2018-11-20 ENCOUNTER — HOSPITAL ENCOUNTER (OUTPATIENT)
Dept: LAB | Age: 68
Discharge: HOME | End: 2018-11-20
Payer: MEDICARE

## 2018-11-20 PROCEDURE — 36415 COLL VENOUS BLD VENIPUNCTURE: CPT

## 2018-11-20 PROCEDURE — 82533 TOTAL CORTISOL: CPT

## 2018-11-21 LAB — CORTIS AM PEAK SERPL-MCNC: 17.7 UG/DL (ref 6.2–19.4)

## 2019-01-17 ENCOUNTER — HOSPITAL ENCOUNTER (OUTPATIENT)
Dept: LAB | Age: 69
Discharge: HOME | End: 2019-01-17
Payer: MEDICARE

## 2019-01-17 ENCOUNTER — OFFICE VISIT (OUTPATIENT)
Dept: INTERNAL MEDICINE CLINIC | Age: 69
End: 2019-01-17

## 2019-01-17 VITALS
HEART RATE: 77 BPM | HEIGHT: 63 IN | TEMPERATURE: 98.1 F | RESPIRATION RATE: 16 BRPM | BODY MASS INDEX: 21.62 KG/M2 | SYSTOLIC BLOOD PRESSURE: 130 MMHG | OXYGEN SATURATION: 98 % | WEIGHT: 122 LBS | DIASTOLIC BLOOD PRESSURE: 76 MMHG

## 2019-01-17 DIAGNOSIS — E78.5 DYSLIPIDEMIA, GOAL LDL BELOW 100: ICD-10-CM

## 2019-01-17 DIAGNOSIS — R01.1 MURMUR: Primary | ICD-10-CM

## 2019-01-17 DIAGNOSIS — I10 HTN, GOAL BELOW 130/80: ICD-10-CM

## 2019-01-17 PROCEDURE — 80053 COMPREHEN METABOLIC PANEL: CPT

## 2019-01-17 PROCEDURE — 84443 ASSAY THYROID STIM HORMONE: CPT

## 2019-01-17 PROCEDURE — 83036 HEMOGLOBIN GLYCOSYLATED A1C: CPT

## 2019-01-17 PROCEDURE — 36415 COLL VENOUS BLD VENIPUNCTURE: CPT

## 2019-01-17 PROCEDURE — 80061 LIPID PANEL: CPT

## 2019-01-17 NOTE — PROGRESS NOTES
HISTORY OF PRESENT ILLNESS  César Vanegas is a 77 y.o. female. HPI  Follow up, generally feeling well. No more fevers or adenopathy since she was seen here in the fall. Energy is good. She had an echo two years ago which showed mild mitral regurgitation and mild aortic regurgitation and left atrium was mildly dilated. She is not having chest pain or dyspnea on exertion or edema. She has occasional palpitations. They do not make her syncopal.  Her blood pressure at home has been in the 117-130/69-74 range on half of an Avapro 300, Atenolol 25 mg daily. She is off the diuretics. She tolerates her statin and is due for labs, no myalgias. Her rectal prolapse has not caused any pain or bleeding. Review of Systems   Constitutional: Negative for chills, fever, malaise/fatigue and weight loss. Respiratory: Negative for cough, shortness of breath and wheezing. Cardiovascular: Positive for palpitations. Negative for chest pain, orthopnea, leg swelling and PND. Gastrointestinal: Negative for abdominal pain, blood in stool, heartburn, melena and nausea. Musculoskeletal: Negative for myalgias. Neurological: Negative for dizziness and headaches. Physical Exam   Constitutional: She is oriented to person, place, and time. She appears well-developed and well-nourished. HENT:   Head: Normocephalic and atraumatic. Neck: Normal range of motion. Neck supple. Carotid bruit is not present. No thyromegaly present. Cardiovascular: Normal rate, regular rhythm, S1 normal, S2 normal and intact distal pulses. Extrasystoles are present. Murmur heard. Pulmonary/Chest: Effort normal and breath sounds normal. No respiratory distress. She has no wheezes. She has no rales. Musculoskeletal: She exhibits no edema. Neurological: She is alert and oriented to person, place, and time. Psychiatric: She has a normal mood and affect.  Her behavior is normal.   Nursing note and vitals reviewed. ASSESSMENT and PLAN  Diagnoses and all orders for this visit:    1. Murmur  -     2D ECHO COMPLETE ADULT (TTE) W OR WO CONTR; Future    2. HTN, goal below 130/80-well controlled cotn meds    3.  Dyslipidemia, goal LDL below 298  -     METABOLIC PANEL, COMPREHENSIVE  -     LIPID PANEL  -     HEMOGLOBIN A1C WITH EAG  -     TSH 3RD GENERATION

## 2019-01-18 LAB
ALBUMIN SERPL-MCNC: 4.5 G/DL (ref 3.6–4.8)
ALBUMIN/GLOB SERPL: 2 {RATIO} (ref 1.2–2.2)
ALP SERPL-CCNC: 70 IU/L (ref 39–117)
ALT SERPL-CCNC: 43 IU/L (ref 0–32)
AST SERPL-CCNC: 71 IU/L (ref 0–40)
BILIRUB SERPL-MCNC: 0.5 MG/DL (ref 0–1.2)
BUN SERPL-MCNC: 10 MG/DL (ref 8–27)
BUN/CREAT SERPL: 14 (ref 12–28)
CALCIUM SERPL-MCNC: 9.4 MG/DL (ref 8.7–10.3)
CHLORIDE SERPL-SCNC: 103 MMOL/L (ref 96–106)
CHOLEST SERPL-MCNC: 177 MG/DL (ref 100–199)
CO2 SERPL-SCNC: 24 MMOL/L (ref 20–29)
CREAT SERPL-MCNC: 0.74 MG/DL (ref 0.57–1)
EST. AVERAGE GLUCOSE BLD GHB EST-MCNC: 97 MG/DL
GLOBULIN SER CALC-MCNC: 2.2 G/DL (ref 1.5–4.5)
GLUCOSE SERPL-MCNC: 101 MG/DL (ref 65–99)
HBA1C MFR BLD: 5 % (ref 4.8–5.6)
HDLC SERPL-MCNC: 77 MG/DL
INTERPRETATION, 910389: NORMAL
LDLC SERPL CALC-MCNC: 74 MG/DL (ref 0–99)
POTASSIUM SERPL-SCNC: 4.6 MMOL/L (ref 3.5–5.2)
PROT SERPL-MCNC: 6.7 G/DL (ref 6–8.5)
SODIUM SERPL-SCNC: 142 MMOL/L (ref 134–144)
TRIGL SERPL-MCNC: 128 MG/DL (ref 0–149)
TSH SERPL DL<=0.005 MIU/L-ACNC: 1.32 UIU/ML (ref 0.45–4.5)
VLDLC SERPL CALC-MCNC: 26 MG/DL (ref 5–40)

## 2019-01-21 DIAGNOSIS — E78.5 DYSLIPIDEMIA: ICD-10-CM

## 2019-01-21 RX ORDER — LOVASTATIN 20 MG/1
TABLET ORAL
Qty: 90 TAB | Refills: 1 | Status: SHIPPED | OUTPATIENT
Start: 2019-01-21 | End: 2019-07-11 | Stop reason: SDUPTHER

## 2019-01-23 ENCOUNTER — HOSPITAL ENCOUNTER (OUTPATIENT)
Dept: NON INVASIVE DIAGNOSTICS | Age: 69
Discharge: HOME | End: 2019-01-23
Attending: INTERNAL MEDICINE
Payer: MEDICARE

## 2019-01-23 DIAGNOSIS — R01.1 MURMUR: ICD-10-CM

## 2019-01-23 PROCEDURE — 93306 TTE W/DOPPLER COMPLETE: CPT

## 2019-01-25 ENCOUNTER — TELEPHONE (OUTPATIENT)
Dept: INTERNAL MEDICINE CLINIC | Age: 69
End: 2019-01-25

## 2019-01-25 NOTE — TELEPHONE ENCOUNTER
Patient is scheduled to see Domonique Sanches on March 1st at 8:20. Patient voiced understanding of appt info.

## 2019-01-25 NOTE — TELEPHONE ENCOUNTER
Discussed her echo shows slightly dec ef 50% and mild mr  Will have her see cardiology for advice on follow up  Please make appt in next month or so with dr Tasneem Peck thanks

## 2019-03-01 ENCOUNTER — OFFICE VISIT (OUTPATIENT)
Dept: CARDIOLOGY CLINIC | Age: 69
End: 2019-03-01

## 2019-03-01 VITALS
SYSTOLIC BLOOD PRESSURE: 144 MMHG | HEIGHT: 63 IN | WEIGHT: 122.4 LBS | BODY MASS INDEX: 21.69 KG/M2 | HEART RATE: 66 BPM | RESPIRATION RATE: 18 BRPM | DIASTOLIC BLOOD PRESSURE: 90 MMHG

## 2019-03-01 DIAGNOSIS — I10 HTN, GOAL BELOW 130/80: ICD-10-CM

## 2019-03-01 DIAGNOSIS — R93.1 ABNORMAL ECHOCARDIOGRAM: ICD-10-CM

## 2019-03-01 DIAGNOSIS — I34.0 NON-RHEUMATIC MITRAL REGURGITATION: Primary | ICD-10-CM

## 2019-03-01 RX ORDER — OLMESARTAN MEDOXOMIL AND HYDROCHLOROTHIAZIDE 20/12.5 20; 12.5 MG/1; MG/1
1 TABLET ORAL DAILY
Qty: 30 TAB | Refills: 12 | Status: SHIPPED | OUTPATIENT
Start: 2019-03-01 | End: 2020-01-14 | Stop reason: SDUPTHER

## 2019-03-01 NOTE — PROGRESS NOTES
Visit Vitals  /90 (BP 1 Location: Left arm)   Pulse 66   Resp 18   Ht 5' 3\" (1.6 m)   Wt 122 lb 6.4 oz (55.5 kg)   BMI 21.68 kg/m²       New patient referred for abnormal echo per Dr. Abel Momin.

## 2019-03-01 NOTE — PROGRESS NOTES
Radu Kraft MD. [facility]              Patient: Valentino Kaur  : 1950      Today's Date: 3/1/2019            HISTORY OF PRESENT ILLNESS:     History of Present Illness:  She is referred for mild MVP and MR noted on recent echo. She has a history of HTN but no other cardiac problems. No complaints. She feels well. Active physically. No CP, SOB, syncope, dizziness. She walks 30 min a day when weather is good. BP has been a little high. PAST MEDICAL HISTORY:     Past Medical History:   Diagnosis Date    Dyslipidemia     HTN, goal below 130/80 2015    Hx of bone density study 2016    Osteopenic    Hx of mammogram 07/10/2017    Negtaive     Impaired glucose tolerance 2015    Mitral regurgitation     mild MR    Osteopenia 2015    Routine Papanicolaou smear 2018    Neg pap        Past Surgical History:   Procedure Laterality Date    HX APPENDECTOMY  1963    HX  SECTION      HX COLONOSCOPY      HX ORTHOPAEDIC  13    right foot         MEDICATIONS:     Current Outpatient Medications   Medication Sig Dispense Refill    ferrous fumarate/vit Bcomp,C (SUPER B COMPLEX PO) Take  by mouth daily.  lovastatin (MEVACOR) 20 mg tablet TAKE 1 TABLET BY MOUTH NIGHTLY 90 Tab 1    irbesartan (AVAPRO) 300 mg tablet 1/2 po qd 30 Tab 2    atenolol (TENORMIN) 50 mg tablet Take 1 Tab by mouth daily. 180 Tab 1    omega-3 fatty acids-vitamin e 1,000 mg cap Take 2 Caps by mouth daily.  Cholecalciferol, Vitamin D3, (VITAMIN D3) 2,000 unit cap capsule Take 2,000 Units by mouth daily. 30 Cap 5    multivitamin, tx-iron-ca-min (THERA-M W/ IRON) 9 mg iron-400 mcg tab tablet Take 1 Tab by mouth daily.  aspirin 81 mg chewable tablet Take 81 mg by mouth daily.          Allergies   Allergen Reactions    Shellfish Derived Diarrhea and Nausea and Vomiting             SOCIAL HISTORY:     Social History     Tobacco Use    Smoking status: Former Smoker     Years: 15.00     Last attempt to quit: 2012     Years since quittin.4    Smokeless tobacco: Never Used   Substance Use Topics    Alcohol use: Yes     Alcohol/week: 0.0 oz    Drug use: No         FAMILY HISTORY:     Family History   Problem Relation Age of Onset    Hypertension Father     Hypertension Sister            REVIEW OF SYMPTOMS:     Review of Symptoms:  Constitutional: Negative for fever, chills  HEENT: Negative for nosebleeds, tinnitus, and vision changes. Respiratory: Negative for cough, wheezing  Cardiovascular: Negative for orthopnea, claudication, syncope, and PND. Gastrointestinal: Negative for abdominal pain, diarrhea, melena. Genitourinary: Negative for dysuria  Musculoskeletal: Negative for myalgias. Skin: Negative for rash  Heme: No problems bleeding. Neurological: Negative for speech change and focal weakness. PHYSICAL EXAM:     Physical Exam:  Visit Vitals  /90 (BP 1 Location: Left arm)   Pulse 66   Resp 18   Ht 5' 3\" (1.6 m)   Wt 122 lb 6.4 oz (55.5 kg)   BMI 21.68 kg/m²     Patient appears generally well, mood and affect are appropriate and pleasant. HEENT:  Hearing intact, non-icteric, normocephalic, atraumatic. Neck Exam: Supple, No JVD or carotid bruits. Lung Exam: Clear to auscultation, even breath sounds. Cardiac Exam: Regular rate and rhythm with brief, 3/6 systolic murmur at apex   Abdomen: Soft, non-tender, normal bowel sounds. No bruits or masses. Extremities: Moves all ext well. No lower extremity edema. Vascular: 2+ dorsalis pedis pulses bilaterally.   Psych: Appropriate affect  Neuro - Grossly intact        LABS / OTHER STUDIES:     Lab Results   Component Value Date/Time    Sodium 142 2019 10:50 AM    Potassium 4.6 2019 10:50 AM    Chloride 103 2019 10:50 AM    CO2 24 2019 10:50 AM    Anion gap 8 2017 10:24 PM    Glucose 101 (H) 2019 10:50 AM    BUN 10 2019 10:50 AM Creatinine 0.74 01/17/2019 10:50 AM    BUN/Creatinine ratio 14 01/17/2019 10:50 AM    GFR est AA 96 01/17/2019 10:50 AM    GFR est non-AA 84 01/17/2019 10:50 AM    Calcium 9.4 01/17/2019 10:50 AM    Bilirubin, total 0.5 01/17/2019 10:50 AM    AST (SGOT) 71 (H) 01/17/2019 10:50 AM    Alk. phosphatase 70 01/17/2019 10:50 AM    Protein, total 6.7 01/17/2019 10:50 AM    Albumin 4.5 01/17/2019 10:50 AM    Globulin 3.4 02/03/2017 10:24 PM    A-G Ratio 2.0 01/17/2019 10:50 AM    ALT (SGPT) 43 (H) 01/17/2019 10:50 AM       Lab Results   Component Value Date/Time    WBC 8.7 11/08/2018 02:15 PM    HGB 11.3 11/08/2018 02:15 PM    HCT 34.0 11/08/2018 02:15 PM    PLATELET 273 23/94/4107 02:15 PM    MCV 99 (H) 11/08/2018 02:15 PM       Lab Results   Component Value Date/Time    Cholesterol, total 177 01/17/2019 10:50 AM    HDL Cholesterol 77 01/17/2019 10:50 AM    LDL, calculated 74 01/17/2019 10:50 AM    VLDL, calculated 26 01/17/2019 10:50 AM    Triglyceride 128 01/17/2019 10:50 AM    CHOL/HDL Ratio 2.7 02/04/2017 01:13 AM       Lab Results   Component Value Date/Time    TSH 1.320 01/17/2019 10:50 AM             CARDIAC DIAGNOSTICS:     Cardiac Evaluation Includes:    EKG 3/1/19 - NSR, PVC    Echo 2/3/17 - LVEF 60%, mild LAE, Mild MR, mild AI    Echo 1/23/19 - I viewed echo myself - On my review - Normal LV size. LVEF 55%, mild post leaflet prolapse and mild-mod MR, mild AI, Asc 3.8 cm           ASSESSMENT AND PLAN:     Assessment and Plan:  1) Mild MVP and mild MR  - I viewed 1/19 echo images myself. Very mild prolapse of posterior leaflet with at most mild-mod MR.  LVEF is normal.   - She is asymptomatic   - Will follow with serial studies once a year     2) Ascending aorta minimally dilated to upper normal size (3.8 cm) on echo 1/19  - follow on serial studies   - Aim for good BP control     3) HTN  - BP still a little high in AM   - She says Irbesartan is recalled.    - Will switch irbesartan to olmesartan-HCTZ (20/12.5) - follow BP at home with long term FU with Dr. Yue Ortiz     4) See me in one year with an echo then. Patient expressed understanding of the plan - questions were answered. She is originally from [place]. She has 3 boys (one is in [facility] and another in [place] [facility]). Renetta Lay MD, [address].  [address]  Ph: [phone]   Ph [phone]

## 2019-03-01 NOTE — Clinical Note
I did tweak her BP med a little (added low dose HCTZ) - I asked that she follow-up with for BP at this time and I would see her back in one year.   Thanks, [employer]

## 2019-03-08 NOTE — PROGRESS NOTES
[facility]   [place]  [facility],  [address]  Phone: [phone] Fax: [phone]      Discharge Summary 2-15    Patient name: Alicia Katz  : 1950  Provider#: [id number]  Referral source: Frannie Rivas MD      Medical/Treatment Diagnosis: Neck pain [M54.2]     Prior Hospitalization: see medical history     Comorbidities: See Plan of Care  Prior Level of Function: See Plan of Care  Medications: Verified on Patient Summary List    Start of Care: 19      Onset Date:1 month +   Visits from Start of Care: 10     Missed Visits: 0  Reporting Period :  to 18    Assessment/Summary of care:        Progress towards goals / Updated goals:  Short Term Goals: To be accomplished in 6 weeks:MET  To demonstrate compliance with HEP  To increase cervical ROM by 10 degrees so patient can turn head to answer phone  [place] to <4/10 so that patient can knit for 20 minutes  Improve cervical strength to 4/5 so patient can hold ipad to read 20 minutes     Long Term Goals: To be accomplished in 12 weeks:MET  Decrease overall pain to <2/10 so patient can sleep through night   Improve cervical and shoulder strength to 5/5 so patient can lift purse without pain  Demonstrate compliance with ergonomic sitting to read/knit  Increase cervical ROM to [facility] so patient can turn head while driving            RECOMMENDATIONS:  [x]Discontinue therapy: [x]Patient has reached or is progressing toward set goals     []Patient is non-compliant or has abdicated     []Due to lack of appreciable progress towards set goals     []Other  Guillermina Jimenes, PT 3/8/2019

## 2019-03-26 ENCOUNTER — HOSPITAL ENCOUNTER (OUTPATIENT)
Dept: LAB | Age: 69
Discharge: HOME | End: 2019-03-26
Payer: MEDICARE

## 2019-03-26 PROCEDURE — 36415 COLL VENOUS BLD VENIPUNCTURE: CPT

## 2019-03-26 PROCEDURE — 80048 BASIC METABOLIC PNL TOTAL CA: CPT

## 2019-03-27 LAB
BUN SERPL-MCNC: 12 MG/DL (ref 8–27)
BUN/CREAT SERPL: 18 (ref 12–28)
CALCIUM SERPL-MCNC: 9.6 MG/DL (ref 8.7–10.3)
CHLORIDE SERPL-SCNC: 96 MMOL/L (ref 96–106)
CO2 SERPL-SCNC: 26 MMOL/L (ref 20–29)
CREAT SERPL-MCNC: 0.68 MG/DL (ref 0.57–1)
GLUCOSE SERPL-MCNC: 127 MG/DL (ref 65–99)
POTASSIUM SERPL-SCNC: 4 MMOL/L (ref 3.5–5.2)
SODIUM SERPL-SCNC: 139 MMOL/L (ref 134–144)

## 2019-07-11 DIAGNOSIS — E78.5 DYSLIPIDEMIA: ICD-10-CM

## 2019-07-11 RX ORDER — LOVASTATIN 20 MG/1
TABLET ORAL
Qty: 90 TAB | Refills: 1 | Status: SHIPPED | OUTPATIENT
Start: 2019-07-11 | End: 2020-01-05

## 2019-09-11 NOTE — PROGRESS NOTES
ASSESSMENT:   (R51) Facial pain  (primary encounter diagnosis)  Comment: This may be jaw joint (TMJ) strain.  Sometimes a salivary inflammation or stone could cause pain.  Dental abscess or problem is another possibility  Plan:   I expect this will improve in the next week or two.   Avoid opening mouth very wide or chewing a lot like gum or sticky candies.   IF you have worse swelling or pain or skin redness, recheck.    IF you have tooth problems, have dentist check it out.   You could try anti-inflammatory medications like ibuprofen or Aleve which may help.    (L57.0) AK (actinic keratosis)  Comment: spots on right forearm may be precancer skin spots.   Plan:Three spots treated today.   Treatment was begun with Liquid nitrogen, freeze/thaw/freeze.  Expect redness for a day or two, then possible blister or sometimes blood blister.  Then the wound may get raw and sore and scab over.  Skin spots should steadily heal and look pink for a few weeks.  New skin should be smooth and the rough irregular skin gone.  Recheck if abnormal skin does not disappear with the treatment.     (N20.0) Kidney stone  Comment: This was likely cause for urine symptoms.  Currently no symptoms.   Plan: Calculi  Urinary ([facility]), *UA reflex to         Microscopic and Culture ([place] [facility] [place]         Clinics (except [place] and [place])          Check urine as there were a few blood cells when checked in July  Send stone for analysis.  In most cases of kidney stones, drinking lots of liquids can help prevent further stones.    HISTORY OF PRESENT ILLNESS  Mesfin Moore is a 78 y.o. female. HPI  Follow up for hypertension. Feels well. On Diovan 320 and Atenolol 50 b.i.d. No recent home blood pressure checks. No headaches, chest pain or shortness of breath. Getting more activity. Dyslipidemia, on Lovastatin. No myalgias. Labs in February looked good. Preventive. Due for mammogram.  Low risk for GYN issues. Last Pap two years ago. Will repeat next year. Bone density was done last year. Up to date on shots. Review of Systems   Constitutional: Negative for chills, fever, malaise/fatigue and weight loss. Respiratory: Negative for cough, shortness of breath and wheezing. Cardiovascular: Negative for chest pain, palpitations, orthopnea, leg swelling and PND. Gastrointestinal: Negative for abdominal pain, diarrhea, heartburn and nausea. Genitourinary: Negative for dysuria. Musculoskeletal: Negative for myalgias. Skin: Positive for itching. Negative for rash. Neurological: Negative for dizziness and headaches. Physical Exam   Constitutional: She is oriented to person, place, and time. She appears well-developed and well-nourished. HENT:   Head: Normocephalic and atraumatic. Neck: Normal range of motion. Neck supple. Carotid bruit is not present. No thyromegaly present. Cardiovascular: Normal rate, regular rhythm, S1 normal, S2 normal, normal heart sounds and intact distal pulses. No murmur heard. Pulmonary/Chest: Effort normal and breath sounds normal. No respiratory distress. She has no wheezes. She has no rales. Breast exam bilaterally without masses axillary nodes or discharge. BSE reviewed      Musculoskeletal: She exhibits no edema. Neurological: She is alert and oriented to person, place, and time. Skin:   Dry patch of skin back near bra line no lesions seen   Psychiatric: She has a normal mood and affect.  Her behavior is normal.   Nursing note and vitals reviewed. ASSESSMENT and PLAN  Alex Montero was seen today for blood pressure check and annual wellness visit. Diagnoses and all orders for this visit:    HTN, goal below 130/80  Cont meds and check at home and if bp over 150 would consider adding hctz to diovan  Dyslipidemia  Stable on mevacor  Breast screening  -     ROYCE 3D GENARO W MAMMO BI SCREENING INCL CAD; Future    Routine general medical examination at a health care facility    Screening for alcoholism  Medicare wellness  I personally saw and examined the patient. I have reviewed and agree with the Nurse navigators findings including all diagnostic interpretations and plans as written. I was present during the key parts of separately billed procedures.

## 2019-10-08 DIAGNOSIS — I10 HTN, GOAL BELOW 130/80: ICD-10-CM

## 2019-10-09 RX ORDER — ATENOLOL 50 MG/1
TABLET ORAL
Qty: 180 TAB | Refills: 1 | Status: SHIPPED | OUTPATIENT
Start: 2019-10-09 | End: 2020-01-14 | Stop reason: SDUPTHER

## 2019-11-04 ENCOUNTER — HOSPITAL ENCOUNTER (OUTPATIENT)
Dept: MAMMOGRAPHY | Age: 69
Discharge: HOME | End: 2019-11-04
Attending: INTERNAL MEDICINE
Payer: MEDICARE

## 2019-11-04 DIAGNOSIS — Z12.31 VISIT FOR SCREENING MAMMOGRAM: ICD-10-CM

## 2019-11-04 PROCEDURE — 77063 BREAST TOMOSYNTHESIS BI: CPT

## 2020-01-05 DIAGNOSIS — E78.5 DYSLIPIDEMIA: ICD-10-CM

## 2020-01-05 RX ORDER — LOVASTATIN 20 MG/1
TABLET ORAL
Qty: 90 TAB | Refills: 1 | Status: SHIPPED | OUTPATIENT
Start: 2020-01-05 | End: 2020-07-01

## 2020-01-14 ENCOUNTER — OFFICE VISIT (OUTPATIENT)
Dept: INTERNAL MEDICINE CLINIC | Age: 70
End: 2020-01-14

## 2020-01-14 VITALS
HEART RATE: 74 BPM | WEIGHT: 128 LBS | DIASTOLIC BLOOD PRESSURE: 76 MMHG | RESPIRATION RATE: 16 BRPM | BODY MASS INDEX: 22.68 KG/M2 | SYSTOLIC BLOOD PRESSURE: 169 MMHG | HEIGHT: 63 IN | TEMPERATURE: 98.9 F | OXYGEN SATURATION: 94 %

## 2020-01-14 DIAGNOSIS — Z78.0 POSTMENOPAUSAL: ICD-10-CM

## 2020-01-14 DIAGNOSIS — I34.1 MITRAL VALVE PROLAPSE: ICD-10-CM

## 2020-01-14 DIAGNOSIS — Z00.00 MEDICARE ANNUAL WELLNESS VISIT, SUBSEQUENT: Primary | ICD-10-CM

## 2020-01-14 DIAGNOSIS — K76.0 NAFLD (NONALCOHOLIC FATTY LIVER DISEASE): ICD-10-CM

## 2020-01-14 DIAGNOSIS — G60.9 IDIOPATHIC PERIPHERAL NEUROPATHY: ICD-10-CM

## 2020-01-14 DIAGNOSIS — R73.01 IFG (IMPAIRED FASTING GLUCOSE): ICD-10-CM

## 2020-01-14 DIAGNOSIS — I10 HTN, GOAL BELOW 130/80: ICD-10-CM

## 2020-01-14 RX ORDER — ATENOLOL 50 MG/1
TABLET ORAL
Qty: 180 TAB | Refills: 1 | Status: SHIPPED | OUTPATIENT
Start: 2020-01-14 | End: 2020-07-01

## 2020-01-14 RX ORDER — LANOLIN ALCOHOL/MO/W.PET/CERES
400 CREAM (GRAM) TOPICAL DAILY
COMMUNITY
End: 2021-07-20 | Stop reason: ALTCHOICE

## 2020-01-14 RX ORDER — OLMESARTAN MEDOXOMIL AND HYDROCHLOROTHIAZIDE 20/12.5 20; 12.5 MG/1; MG/1
1 TABLET ORAL DAILY
Qty: 90 TAB | Refills: 3 | Status: SHIPPED | OUTPATIENT
Start: 2020-01-14 | End: 2020-03-12 | Stop reason: SDUPTHER

## 2020-01-15 ENCOUNTER — HOSPITAL ENCOUNTER (OUTPATIENT)
Dept: LAB | Age: 70
Discharge: HOME | End: 2020-01-15

## 2020-01-15 DIAGNOSIS — R73.01 IFG (IMPAIRED FASTING GLUCOSE): ICD-10-CM

## 2020-01-15 DIAGNOSIS — G60.9 IDIOPATHIC PERIPHERAL NEUROPATHY: ICD-10-CM

## 2020-01-15 DIAGNOSIS — K76.0 NAFLD (NONALCOHOLIC FATTY LIVER DISEASE): ICD-10-CM

## 2020-01-15 DIAGNOSIS — I10 HTN, GOAL BELOW 130/80: ICD-10-CM

## 2020-01-15 LAB
ALBUMIN SERPL-MCNC: 3.9 G/DL (ref 3.5–5)
ALBUMIN/GLOB SERPL: 1.3 {RATIO} (ref 1.1–2.2)
ALP SERPL-CCNC: 59 U/L (ref 45–117)
ALT SERPL-CCNC: 61 U/L (ref 12–78)
ANION GAP SERPL CALC-SCNC: 6 MMOL/L (ref 5–15)
APPEARANCE UR: CLEAR
AST SERPL-CCNC: 62 U/L (ref 15–37)
BACTERIA URNS QL MICRO: NEGATIVE /HPF
BASOPHILS # BLD: 0.1 K/UL (ref 0–0.1)
BASOPHILS NFR BLD: 2 % (ref 0–1)
BILIRUB SERPL-MCNC: 0.5 MG/DL (ref 0.2–1)
BILIRUB UR QL CFM: NEGATIVE
BUN SERPL-MCNC: 16 MG/DL (ref 6–20)
BUN/CREAT SERPL: 20 (ref 12–20)
CALCIUM SERPL-MCNC: 9.4 MG/DL (ref 8.5–10.1)
CHLORIDE SERPL-SCNC: 104 MMOL/L (ref 97–108)
CHOLEST SERPL-MCNC: 163 MG/DL
CO2 SERPL-SCNC: 31 MMOL/L (ref 21–32)
COLOR UR: ABNORMAL
CREAT SERPL-MCNC: 0.8 MG/DL (ref 0.55–1.02)
DIFFERENTIAL METHOD BLD: ABNORMAL
EOSINOPHIL # BLD: 0.2 K/UL (ref 0–0.4)
EOSINOPHIL NFR BLD: 3 % (ref 0–7)
EPITH CASTS URNS QL MICRO: ABNORMAL /LPF
ERYTHROCYTE [DISTWIDTH] IN BLOOD BY AUTOMATED COUNT: 12.4 % (ref 11.5–14.5)
EST. AVERAGE GLUCOSE BLD GHB EST-MCNC: 114 MG/DL
GLOBULIN SER CALC-MCNC: 3 G/DL (ref 2–4)
GLUCOSE SERPL-MCNC: 93 MG/DL (ref 65–100)
GLUCOSE UR STRIP.AUTO-MCNC: NEGATIVE MG/DL
HBA1C MFR BLD: 5.6 % (ref 4–5.6)
HCT VFR BLD AUTO: 38.8 % (ref 35–47)
HDLC SERPL-MCNC: 76 MG/DL
HDLC SERPL: 2.1 {RATIO} (ref 0–5)
HGB BLD-MCNC: 12.2 G/DL (ref 11.5–16)
HGB UR QL STRIP: NEGATIVE
HYALINE CASTS URNS QL MICRO: ABNORMAL /LPF (ref 0–5)
IMM GRANULOCYTES # BLD AUTO: 0 K/UL (ref 0–0.04)
IMM GRANULOCYTES NFR BLD AUTO: 0 % (ref 0–0.5)
KETONES UR QL STRIP.AUTO: NEGATIVE MG/DL
LDLC SERPL CALC-MCNC: 65.8 MG/DL (ref 0–100)
LEUKOCYTE ESTERASE UR QL STRIP.AUTO: ABNORMAL
LIPID PROFILE,FLP: NORMAL
LYMPHOCYTES # BLD: 2.3 K/UL (ref 0.8–3.5)
LYMPHOCYTES NFR BLD: 38 % (ref 12–49)
MCH RBC QN AUTO: 32.9 PG (ref 26–34)
MCHC RBC AUTO-ENTMCNC: 31.4 G/DL (ref 30–36.5)
MCV RBC AUTO: 104.6 FL (ref 80–99)
MONOCYTES # BLD: 0.8 K/UL (ref 0–1)
MONOCYTES NFR BLD: 12 % (ref 5–13)
NEUTS SEG # BLD: 2.7 K/UL (ref 1.8–8)
NEUTS SEG NFR BLD: 45 % (ref 32–75)
NITRITE UR QL STRIP.AUTO: NEGATIVE
NRBC # BLD: 0 K/UL (ref 0–0.01)
NRBC BLD-RTO: 0 PER 100 WBC
PH UR STRIP: 6.5 [PH] (ref 5–8)
PLATELET # BLD AUTO: 185 K/UL (ref 150–400)
PMV BLD AUTO: 10.8 FL (ref 8.9–12.9)
POTASSIUM SERPL-SCNC: 4.2 MMOL/L (ref 3.5–5.1)
PROT SERPL-MCNC: 6.9 G/DL (ref 6.4–8.2)
PROT UR STRIP-MCNC: NEGATIVE MG/DL
RBC # BLD AUTO: 3.71 M/UL (ref 3.8–5.2)
RBC #/AREA URNS HPF: ABNORMAL /HPF (ref 0–5)
SODIUM SERPL-SCNC: 141 MMOL/L (ref 136–145)
SP GR UR REFRACTOMETRY: 1.02 (ref 1–1.03)
TRIGL SERPL-MCNC: 106 MG/DL (ref ?–150)
TSH SERPL DL<=0.05 MIU/L-ACNC: 1.78 UIU/ML (ref 0.36–3.74)
UROBILINOGEN UR QL STRIP.AUTO: 0.2 EU/DL (ref 0.2–1)
VLDLC SERPL CALC-MCNC: 21.2 MG/DL
WBC # BLD AUTO: 6 K/UL (ref 3.6–11)
WBC URNS QL MICRO: ABNORMAL /HPF (ref 0–4)

## 2020-01-15 NOTE — PATIENT INSTRUCTIONS
Medicare Wellness Visit, Female The best way to live healthy is to have a lifestyle where you eat a well-balanced diet, exercise regularly, limit alcohol use, and quit all forms of tobacco/nicotine, if applicable. Regular preventive services are another way to keep healthy. Preventive services (vaccines, screening tests, monitoring & exams) can help personalize your care plan, which helps you manage your own care. Screening tests can find health problems at the earliest stages, when they are easiest to treat. [facility] follows the current, evidence-based guidelines published by the [facility] Gary Cowan ([facility]) when recommending preventive services for our patients. Because we follow these guidelines, sometimes recommendations change over time as research supports it. (For example, mammograms used to be recommended annually. Even though Medicare will still pay for an annual mammogram, the newer guidelines recommend a mammogram every two years for women of average risk). Of course, you and your doctor may decide to screen more often for some diseases, based on your risk and your co-morbidities (chronic disease you are already diagnosed with). Preventive services for you include: - Medicare offers their members a free annual wellness visit, which is time for you and your primary care provider to discuss and plan for your preventive service needs. Take advantage of this benefit every year! 
-All adults over the age of 72 should receive the recommended pneumonia vaccines. Current USPSTF guidelines recommend a series of two vaccines for the best pneumonia protection.  
-All adults should have a flu vaccine yearly and a tetanus vaccine every 10 years.  
-All adults age 48 and older should receive the shingles vaccines (series of two vaccines). -All adults age 38-68 who are overweight should have a diabetes screening test once every three years. -All adults born between [birth year] and 1965 should be screened once for Hepatitis C. 
-Other screening tests and preventive services for persons with diabetes include: an eye exam to screen for diabetic retinopathy, a kidney function test, a foot exam, and stricter control over your cholesterol.  
-Cardiovascular screening for adults with routine risk involves an electrocardiogram (ECG) at intervals determined by your doctor.  
-Colorectal cancer screenings should be done for adults age 54-65 with no increased risk factors for colorectal cancer. There are a number of acceptable methods of screening for this type of cancer. Each test has its own benefits and drawbacks. Discuss with your doctor what is most appropriate for you during your annual wellness visit. The different tests include: colonoscopy (considered the best screening method), a fecal occult blood test, a fecal DNA test, and sigmoidoscopy. 
 
-A bone mass density test is recommended when a woman turns 65 to screen for osteoporosis. This test is only recommended one time, as a screening. Some providers will use this same test as a disease monitoring tool if you already have osteoporosis. -Breast cancer screenings are recommended every other year for women of normal risk, age 54-69. 
-Cervical cancer screenings for women over age 72 are only recommended with certain risk factors. Here is a list of your current Health Maintenance items (your personalized list of preventive services) with a due date: 
Health Maintenance Due Topic Date Due  Glaucoma Screening   01/01/2016 [facility] Annual Well Visit  11/16/2019

## 2020-01-15 NOTE — PROGRESS NOTES
HISTORY OF PRESENT ILLNESS  Jahaira Morgan is a 72 y.o. female. HPI  Seen for wellness visit and follow up on medications, generally feeling very well, enjoys walking and travel. Issues:  1. Hypertension, on Atenolol 50 once a day with Benicar/HCTZ 20/12.5 daily. No headaches, dizzy spells or chest pain. Blood pressure is running high here and will bump to Atenolol b.i.d., try to decrease salt and contact me in one month with levels. 2. Mild to moderate mitral regurge seen on echo last spring, did see Dr. Monique Medeiros. Has follow up study this March. 3. Postmenopausal.  Last bone density four years ago. Will repeat study. 4. Leg cramps seem to have improved with addition of magnesium oxide at bedtime. Review of Systems   Constitutional: Negative for chills, diaphoresis, fever, malaise/fatigue and weight loss. HENT: Negative for hearing loss. Respiratory: Negative for cough, shortness of breath and wheezing. Cardiovascular: Negative for chest pain, palpitations, orthopnea, leg swelling and PND. Gastrointestinal: Negative for abdominal pain, constipation, diarrhea, heartburn, nausea and vomiting. Genitourinary: Negative for dysuria, frequency and urgency. Musculoskeletal: Negative for back pain, joint pain, myalgias and neck pain. Neurological: Positive for sensory change (mild sensory loss iin feet stable over time). Negative for dizziness, focal weakness and headaches. Psychiatric/Behavioral: Negative for depression and memory loss. All other systems reviewed and are negative. Physical Exam  Vitals signs and nursing note reviewed. Constitutional:       Appearance: She is well-developed. HENT:      Head: Normocephalic and atraumatic.       Right Ear: Tympanic membrane, ear canal and external ear normal.      Left Ear: Tympanic membrane, ear canal and external ear normal.      Nose: Nose normal.      Mouth/Throat:      Mouth: Mucous membranes are moist.      Pharynx: No oropharyngeal exudate or posterior oropharyngeal erythema. Eyes:      General:         Right eye: No discharge. Left eye: No discharge. Conjunctiva/sclera: Conjunctivae normal.      Pupils: Pupils are equal, round, and reactive to light. Neck:      Musculoskeletal: Normal range of motion and neck supple. Thyroid: No thyromegaly. Vascular: No carotid bruit. Cardiovascular:      Rate and Rhythm: Normal rate and regular rhythm. Heart sounds: S1 normal and S2 normal. Murmur present. Pulmonary:      Effort: Pulmonary effort is normal. No respiratory distress. Breath sounds: Normal breath sounds. No wheezing or rales. Abdominal:      General: Bowel sounds are normal.      Palpations: Abdomen is soft. There is no mass. Tenderness: There is no tenderness. There is no guarding. Hernia: No hernia is present. Genitourinary:     Comments: Breast exam bilaterally without masses axillary nodes or discharge. BSE reviewed     Musculoskeletal:      Right lower leg: No edema. Left lower leg: No edema. Lymphadenopathy:      Cervical: No cervical adenopathy. Skin:     General: Skin is warm. Findings: No erythema or rash. Neurological:      Mental Status: She is alert and oriented to person, place, and time. Psychiatric:         Mood and Affect: Mood normal.         Behavior: Behavior normal.         ASSESSMENT and PLAN  Diagnoses and all orders for this visit:    1. Medicare annual wellness visit, subsequent    2. HTN, goal below 130/80  -     atenolol (TENORMIN) 50 mg tablet; TAKE 1 TABLET BY MOUTH TWO TIMES DAILY  -     olmesartan-hydroCHLOROthiazide (BENICAR HCT) 20-12.5 mg per tablet; Take 1 Tab by mouth daily.  -     URINALYSIS W/ RFLX MICROSCOPIC; Future    3. NAFLD (nonalcoholic fatty liver disease)  -     METABOLIC PANEL, COMPREHENSIVE; Future    4. Mitral valve prolapse-for echo in march    5.  Postmenopausal  -     DEXA BONE DENSITY STUDY AXIAL; Future    6. Idiopathic peripheral neuropathy  -     CBC WITH AUTOMATED DIFF; Future  -     TSH 3RD GENERATION; Future    7. IFG (impaired fasting glucose)  -     METABOLIC PANEL, COMPREHENSIVE; Future  -     LIPID PANEL; Future  -     TSH 3RD GENERATION; Future  -     HEMOGLOBIN A1C WITH EAG; Future    This is the Subsequent Medicare Annual Wellness Exam, performed 12 months or more after the Initial AWV or the last Subsequent AWV    I have reviewed the patient's medical history in detail and updated the computerized patient record. History     Patient Active Problem List   Diagnosis Code    FHx: AAA [id number]    HTN, goal below 130/80 I10    Dyslipidemia E78.5    Impaired glucose tolerance R73.02    NAFLD (nonalcoholic fatty liver disease) K76.0    Osteopenia M85.80    Pyuria R82.81    Mitral regurgitation I34.0     Past Medical History:   Diagnosis Date    Dyslipidemia     HTN, goal below 130/80 2015    Hx of bone density study 2016    Osteopenic    Hx of mammogram 07/10/2017    Negtaive     Impaired glucose tolerance 2015    Mitral regurgitation     mild MR    Osteopenia 2015    Routine Papanicolaou smear 2018    Neg pap       Past Surgical History:   Procedure Laterality Date    HX APPENDECTOMY  1963    HX  SECTION      HX COLONOSCOPY      HX ORTHOPAEDIC  13    right foot     Current Outpatient Medications   Medication Sig Dispense Refill    magnesium oxide (MAG-OX) 400 mg tablet Take 400 mg by mouth daily.  TURMERIC ROOT EXTRACT PO Take  by mouth.  atenolol (TENORMIN) 50 mg tablet TAKE 1 TABLET BY MOUTH TWO TIMES DAILY 180 Tab 1    olmesartan-hydroCHLOROthiazide (BENICAR HCT) 20-12.5 mg per tablet Take 1 Tab by mouth daily. 90 Tab 3    lovastatin (MEVACOR) 20 mg tablet TAKE 1 TABLET BY MOUTH NIGHTLY 90 Tab 1    ferrous fumarate/vit Bcomp,C (SUPER B COMPLEX PO) Take  by mouth daily.       omega-3 fatty acids-vitamin e 1,000 mg cap Take 2 Caps by mouth daily.  Cholecalciferol, Vitamin D3, (VITAMIN D3) 2,000 unit cap capsule Take 2,000 Units by mouth daily. 30 Cap 5    multivitamin, tx-iron-ca-min (THERA-M W/ IRON) 9 mg iron-400 mcg tab tablet Take 1 Tab by mouth daily.  aspirin 81 mg chewable tablet Take 81 mg by mouth daily. Allergies   Allergen Reactions    Shellfish Derived Diarrhea and Nausea and Vomiting       Family History   Problem Relation Age of Onset    Hypertension Father     Hypertension Sister      Social History     Tobacco Use    Smoking status: Former Smoker     Years: 15.00     Last attempt to quit: 2012     Years since quittin.3    Smokeless tobacco: Never Used   Substance Use Topics    Alcohol use: Yes     Alcohol/week: 0.0 standard drinks       Depression Risk Factor Screening:     3 most recent PHQ Screens 2020   Little interest or pleasure in doing things Not at all   Feeling down, depressed, irritable, or hopeless Not at all   Total Score PHQ 2 0       Alcohol Risk Factor Screening:   Do you average 1 drink per night or more than 7 drinks a week:  No    On any one occasion in the past three months have you have had more than 3 drinks containing alcohol:  No      Functional Ability and Level of Safety:   Hearing: Hearing is good. Activities of Daily Living: The home contains: no safety equipment. Patient does total self care    Ambulation: with no difficulty    Fall Risk:  Fall Risk Assessment, last 12 mths 2020   Able to walk? Yes   Fall in past 12 months?  No       Abuse Screen:  Patient is not abused    Cognitive Screening   Has your family/caregiver stated any concerns about your memory: no      Patient Care Team   Patient Care Team:  Tulio Saldana MD as PCP - General (Internal Medicine)  Tulio Saldana MD as PCP - [facility]  Deyanira Murillo MD (Cardiology)    Assessment/Plan   Education and counseling provided:  Are appropriate based on today's review and evaluation  Pneumococcal Vaccine  Influenza Vaccine  Screening Mammography  Screening Pap and pelvic (covered once every 2 years)  Colorectal cancer screening tests  Bone mass measurement (DEXA)    Diagnoses and all orders for this visit:    1. Medicare annual wellness visit, subsequent    2. HTN, goal below 130/80  -     atenolol (TENORMIN) 50 mg tablet; TAKE 1 TABLET BY MOUTH TWO TIMES DAILY  -     olmesartan-hydroCHLOROthiazide (BENICAR HCT) 20-12.5 mg per tablet; Take 1 Tab by mouth daily.  -     URINALYSIS W/ RFLX MICROSCOPIC; Future    3. NAFLD (nonalcoholic fatty liver disease)  -     METABOLIC PANEL, COMPREHENSIVE; Future    4. Mitral valve prolapse    5. Postmenopausal  -     DEXA BONE DENSITY STUDY AXIAL; Future    6. Idiopathic peripheral neuropathy  -     CBC WITH AUTOMATED DIFF; Future  -     TSH 3RD GENERATION; Future    7. IFG (impaired fasting glucose)  -     METABOLIC PANEL, COMPREHENSIVE; Future  -     LIPID PANEL; Future  -     TSH 3RD GENERATION; Future  -     HEMOGLOBIN A1C WITH EAG;  Future        Health Maintenance Due   Topic Date Due    GLAUCOMA SCREENING Q2Y  01/01/2016    MEDICARE YEARLY EXAM  11/16/2019

## 2020-01-16 DIAGNOSIS — D75.89 MACROCYTOSIS WITHOUT ANEMIA: Primary | ICD-10-CM

## 2020-01-17 ENCOUNTER — HOSPITAL ENCOUNTER (OUTPATIENT)
Dept: LAB | Age: 70
Discharge: HOME | End: 2020-01-17

## 2020-01-17 DIAGNOSIS — D75.89 MACROCYTOSIS WITHOUT ANEMIA: ICD-10-CM

## 2020-01-17 LAB
FOLATE SERPL-MCNC: 55.3 NG/ML (ref 5–21)
VIT B12 SERPL-MCNC: 755 PG/ML (ref 193–986)

## 2020-01-24 ENCOUNTER — HOSPITAL ENCOUNTER (OUTPATIENT)
Dept: MAMMOGRAPHY | Age: 70
Discharge: HOME | End: 2020-01-24
Attending: INTERNAL MEDICINE
Payer: MEDICARE

## 2020-01-24 DIAGNOSIS — Z78.0 POSTMENOPAUSAL: ICD-10-CM

## 2020-01-24 PROCEDURE — 77080 DXA BONE DENSITY AXIAL: CPT

## 2020-03-12 ENCOUNTER — OFFICE VISIT (OUTPATIENT)
Dept: CARDIOLOGY CLINIC | Age: 70
End: 2020-03-12

## 2020-03-12 VITALS
HEIGHT: 63 IN | RESPIRATION RATE: 15 BRPM | SYSTOLIC BLOOD PRESSURE: 170 MMHG | OXYGEN SATURATION: 97 % | BODY MASS INDEX: 22.32 KG/M2 | DIASTOLIC BLOOD PRESSURE: 90 MMHG | WEIGHT: 126 LBS | HEART RATE: 64 BPM

## 2020-03-12 DIAGNOSIS — I10 HTN, GOAL BELOW 130/80: Primary | ICD-10-CM

## 2020-03-12 DIAGNOSIS — I34.0 NONRHEUMATIC MITRAL VALVE REGURGITATION: ICD-10-CM

## 2020-03-12 RX ORDER — AMLODIPINE BESYLATE 2.5 MG/1
2.5 TABLET ORAL DAILY
Qty: 90 TAB | Refills: 3 | Status: SHIPPED | OUTPATIENT
Start: 2020-03-12 | End: 2021-02-16 | Stop reason: SDUPTHER

## 2020-03-12 RX ORDER — OLMESARTAN MEDOXOMIL AND HYDROCHLOROTHIAZIDE 20/12.5 20; 12.5 MG/1; MG/1
1 TABLET ORAL DAILY
Qty: 90 TAB | Refills: 3 | Status: SHIPPED | OUTPATIENT
Start: 2020-03-12 | End: 2021-03-12 | Stop reason: SDUPTHER

## 2020-03-12 NOTE — PROGRESS NOTES
Poonam Farrell MD. [facility] [place]              Patient: Tyson Elmore  : 1950      Today's Date: 3/12/2020              HISTORY OF PRESENT ILLNESS:     History of Present Illness:  Here for follow-up. She denies complaints. No CP or SOB. -140 at home. PAST MEDICAL HISTORY:     Past Medical History:   Diagnosis Date    Dyslipidemia     HTN, goal below 130/80 2015    Hx of bone density study 2016    Osteopenic    Hx of mammogram 07/10/2017    Negtaive     Impaired glucose tolerance 2015    Mitral regurgitation     mild MR    Osteopenia 2015    Routine Papanicolaou smear 2018    Neg pap        Past Surgical History:   Procedure Laterality Date    HX APPENDECTOMY  1963    HX  SECTION      HX COLONOSCOPY      HX ORTHOPAEDIC  13    right foot         MEDICATIONS:     Current Outpatient Medications   Medication Sig Dispense Refill    magnesium oxide (MAG-OX) 400 mg tablet Take 400 mg by mouth daily.  TURMERIC ROOT EXTRACT PO Take  by mouth.  atenolol (TENORMIN) 50 mg tablet TAKE 1 TABLET BY MOUTH TWO TIMES DAILY 180 Tab 1    olmesartan-hydroCHLOROthiazide (BENICAR HCT) 20-12.5 mg per tablet Take 1 Tab by mouth daily. 90 Tab 3    lovastatin (MEVACOR) 20 mg tablet TAKE 1 TABLET BY MOUTH NIGHTLY 90 Tab 1    ferrous fumarate/vit Bcomp,C (SUPER B COMPLEX PO) Take  by mouth daily.  omega-3 fatty acids-vitamin e 1,000 mg cap Take 2 Caps by mouth daily.  Cholecalciferol, Vitamin D3, (VITAMIN D3) 2,000 unit cap capsule Take 2,000 Units by mouth daily. 30 Cap 5    multivitamin, tx-iron-ca-min (THERA-M W/ IRON) 9 mg iron-400 mcg tab tablet Take 1 Tab by mouth daily.  aspirin 81 mg chewable tablet Take 81 mg by mouth daily.          Allergies   Allergen Reactions    Shellfish Derived Diarrhea and Nausea and Vomiting             SOCIAL HISTORY:     Social History     Tobacco Use    Smoking status: Former Smoker Years: 15.00     Last attempt to quit: 2012     Years since quittin.5    Smokeless tobacco: Never Used   Substance Use Topics    Alcohol use: Yes     Alcohol/week: 0.0 standard drinks    Drug use: No         FAMILY HISTORY:     Family History   Problem Relation Age of Onset    Hypertension Father     Hypertension Sister             REVIEW OF SYMPTOMS:      Review of Symptoms:  Constitutional: Negative for fever, chills  HEENT: Negative for nosebleeds, tinnitus, and vision changes. Respiratory: Negative for cough, wheezing  Cardiovascular: Negative for orthopnea, claudication, syncope, and PND. Gastrointestinal: Negative for abdominal pain, diarrhea, melena. Genitourinary: Negative for dysuria  Musculoskeletal: Negative for myalgias. Skin: Negative for rash  Heme: No problems bleeding. Neurological: Negative for speech change and focal weakness.               PHYSICAL EXAM:      Physical Exam:  Visit Vitals  /90 (BP Patient Position: Sitting)   Pulse 64   Resp 15   Ht 5' 3\" (1.6 m)   Wt 126 lb (57.2 kg)   SpO2 97%   BMI 22.32 kg/m²       Patient appears generally well, mood and affect are appropriate and pleasant. HEENT:  Hearing intact, non-icteric, normocephalic, atraumatic. Neck Exam: Supple, No JVD    Lung Exam: Clear to auscultation, even breath sounds. Cardiac Exam: Regular rate and rhythm with brief, 3/6 systolic murmur at apex   Abdomen: Soft, non-tender, normal bowel sounds. No bruits or masses. Extremities: Moves all ext well. No lower extremity edema. Vascular: palpable dorsalis pedis pulses bilaterally.   Psych: Appropriate affect  Neuro - Grossly intact           LABS / OTHER STUDIES:     Lab Results   Component Value Date/Time    Sodium 141 01/15/2020 09:11 AM    Potassium 4.2 01/15/2020 09:11 AM    Chloride 104 01/15/2020 09:11 AM    CO2 31 01/15/2020 09:11 AM    Anion gap 6 01/15/2020 09:11 AM    Glucose 93 01/15/2020 09:11 AM    BUN 16 01/15/2020 09:11 AM Creatinine 0.80 01/15/2020 09:11 AM    BUN/Creatinine ratio 20 01/15/2020 09:11 AM    GFR est AA >60 01/15/2020 09:11 AM    GFR est non-AA >60 01/15/2020 09:11 AM    Calcium 9.4 01/15/2020 09:11 AM    Bilirubin, total 0.5 01/15/2020 09:11 AM    AST (SGOT) 62 (H) 01/15/2020 09:11 AM    Alk. phosphatase 59 01/15/2020 09:11 AM    Protein, total 6.9 01/15/2020 09:11 AM    Albumin 3.9 01/15/2020 09:11 AM    Globulin 3.0 01/15/2020 09:11 AM    A-G Ratio 1.3 01/15/2020 09:11 AM    ALT (SGPT) 61 01/15/2020 09:11 AM     Lab Results   Component Value Date/Time    WBC 6.0 01/15/2020 09:11 AM    HGB 12.2 01/15/2020 09:11 AM    HCT 38.8 01/15/2020 09:11 AM    PLATELET 842 83/99/8542 09:11 AM    .6 (H) 01/15/2020 09:11 AM     Lab Results   Component Value Date/Time    Cholesterol, total 163 01/15/2020 09:11 AM    HDL Cholesterol 76 01/15/2020 09:11 AM    LDL, calculated 65.8 01/15/2020 09:11 AM    VLDL, calculated 21.2 01/15/2020 09:11 AM    Triglyceride 106 01/15/2020 09:11 AM    CHOL/HDL Ratio 2.1 01/15/2020 09:11 AM        Lab Results   Component Value Date/Time    TSH 1.78 01/15/2020 09:11 AM                CARDIAC DIAGNOSTICS:      Cardiac Evaluation Includes:     EKG 3/1/19 - NSR, PVC     Echo 2/3/17 - LVEF 60%, mild LAE, Mild MR, mild AI     Echo 1/23/19 - I viewed echo myself - On my review - Normal LV size. LVEF 55%, mild post leaflet prolapse and mild-mod MR, mild AI, Asc 3.8 cm      Echo 3/12/20 -  LVEF 58%, Mitral valve thickening. Myxomatous mitral valve disease. Mild mitral valve prolapse. Late systolic mitral valve prolapse. Moderate mitral valve regurgitation is present. Dilated sinuses of Valsalva; diameter is 3.5 cm. Dilated ascending aorta; diameter is 3.7 cm.          ASSESSMENT AND PLAN:      Assessment and Plan:  1) Mild MVP and mild-mod MR  - I viewed 1/19 echo images myself.   Very mild prolapse of posterior leaflet with at most mild-mod MR.  LVEF is normal.   - Echo 3/12/20 stable   - She is asymptomatic - check an echo yearly      2) Ascending aorta minimally dilated to upper normal size (3.8 cm) on echo 1/19  - follow on serial studies   - Aim for good BP control      3) HTN  - BP is still high   - continue other meds, but add low dose amlodipine before increasing ARB-HCTZ further   - See me in one month to reassess      4) See me in one month. Patient expressed understanding of the plan - questions were answered. She is originally from [facility]. She has 3 boys (one is in [facility] and another in [facility]).       Lexis Vasquez MD, [address] [place] [address], [address]      [address]  [address] [facility], [address]  Ph: [phone]                               Ph [phone]

## 2020-04-23 ENCOUNTER — TELEPHONE (OUTPATIENT)
Dept: CARDIOLOGY CLINIC | Age: 70
End: 2020-04-23

## 2020-04-24 ENCOUNTER — VIRTUAL VISIT (OUTPATIENT)
Dept: CARDIOLOGY CLINIC | Age: 70
End: 2020-04-24

## 2020-04-24 DIAGNOSIS — I10 HTN, GOAL BELOW 130/80: Primary | ICD-10-CM

## 2020-04-24 NOTE — PROGRESS NOTES
Ok Green MD. [address] [facility], [address] [facility]  Ph [phone]; Fax [phone]        Patient: Nighat Benson  : 1950      Today's Date: 2020        [facility] Cardiology Telemedicine Encounter                                                         Pursuant to the emergency declaration under the [address] [place] [address] [facility] authority and the [employer] Act, this Virtual  Visit was conducted, with patient's consent, to reduce the patient's risk of exposure to COVID-19 and provide continuity of care for an established patient. Services were provided through a video synchronous discussion virtually to substitute for in-person clinic visit. HISTORY OF PRESENT ILLNESS:     History of Present Illness:    Nighat Benson is a 72 y.o. female who was seen by synchronous (real-time) audio-video technology on 2020. She is doing OK. BP has been OK. She has been feeling OK. No CP or SOB.           PAST MEDICAL HISTORY:     Past Medical History:   Diagnosis Date    Dyslipidemia     HTN, goal below 130/80 2015    Hx of bone density study 2016    Osteopenic    Hx of mammogram 07/10/2017    Negtaive     Impaired glucose tolerance 2015    Mitral regurgitation     mild MR    Osteopenia 2015    Routine Papanicolaou smear 2018    Neg pap            Past Surgical History:   Procedure Laterality Date    HX APPENDECTOMY  1963    HX  SECTION      HX COLONOSCOPY      HX ORTHOPAEDIC  13    right foot           Patient Active Problem List   Diagnosis Code    FHx: AAA [id number]    HTN, goal below 130/80 I10    Dyslipidemia E78.5    Impaired glucose tolerance R73.02    NAFLD (nonalcoholic fatty liver disease) K76.0    Osteopenia M85.80    Pyuria R82.81    Mitral regurgitation I34.0             MEDICATIONS:     Current Outpatient Medications   Medication Sig Dispense Refill    amLODIPine (NORVASC) 2.5 mg tablet Take 1 Tab by mouth daily. 90 Tab 3    olmesartan-hydroCHLOROthiazide (BENICAR HCT) 20-12.5 mg per tablet Take 1 Tab by mouth daily. 90 Tab 3    magnesium oxide (MAG-OX) 400 mg tablet Take 400 mg by mouth daily.  TURMERIC ROOT EXTRACT PO Take  by mouth.  atenolol (TENORMIN) 50 mg tablet TAKE 1 TABLET BY MOUTH TWO TIMES DAILY 180 Tab 1    lovastatin (MEVACOR) 20 mg tablet TAKE 1 TABLET BY MOUTH NIGHTLY 90 Tab 1    ferrous fumarate/vit Bcomp,C (SUPER B COMPLEX PO) Take  by mouth daily.  omega-3 fatty acids-vitamin e 1,000 mg cap Take 2 Caps by mouth daily.  Cholecalciferol, Vitamin D3, (VITAMIN D3) 2,000 unit cap capsule Take 2,000 Units by mouth daily. 30 Cap 5    multivitamin, tx-iron-ca-min (THERA-M W/ IRON) 9 mg iron-400 mcg tab tablet Take 1 Tab by mouth daily.  aspirin 81 mg chewable tablet Take 81 mg by mouth daily. Allergies   Allergen Reactions    Shellfish Derived Diarrhea and Nausea and Vomiting               SOCIAL HISTORY:     Social History     Tobacco Use    Smoking status: Former Smoker     Years: 15.00     Last attempt to quit: 2012     Years since quittin.6    Smokeless tobacco: Never Used   Substance Use Topics    Alcohol use: Yes     Alcohol/week: 0.0 standard drinks    Drug use: No               FAMILY HISTORY:     Family History   Problem Relation Age of Onset    Hypertension Father     Hypertension Sister                REVIEW OF SYMPTOMS:     Review of Symptoms:  Constitutional: Negative for fever   HEENT: Negative for vision changes. Respiratory: Negative for productive cough  Cardiovascular: Negative for syncope    Gastrointestinal: Negative for abdominal pain, melena  Genitourinary: Negative for dysuria  Skin: Negative for rash  Heme: No problems bleeding.   Neuro - no speech changes or focal weaknesses              PHYSICAL EXAM: Physical Exam:    Due to this being a TeleHealth evaluation, many elements of the physical examination are unable to be assessed. General: Well developed, in no acute distress, cooperative and alert  HEENT: Pupils equal/round. No marked JVD visible on video. Respiratory: No audible wheezing, no signs of respiratory distress, lips non cyanotic  Extremities:  No edema  Neuro: A&Ox3, speech clear, no facial droop, answering questions appropriately  Skin: Skin color is normal. No rashes or lesions. Non diaphoretic on visible skin during exam        LABS / OTHER STUDIES:         Lab Results   Component Value Date/Time    Cholesterol, total 163 01/15/2020 09:11 AM    HDL Cholesterol 76 01/15/2020 09:11 AM    LDL, calculated 65.8 01/15/2020 09:11 AM    Triglyceride 106 01/15/2020 09:11 AM    CHOL/HDL Ratio 2.1 01/15/2020 09:11 AM       Lab Results   Component Value Date/Time    Sodium 141 01/15/2020 09:11 AM    Potassium 4.2 01/15/2020 09:11 AM    Chloride 104 01/15/2020 09:11 AM    CO2 31 01/15/2020 09:11 AM    Anion gap 6 01/15/2020 09:11 AM    Glucose 93 01/15/2020 09:11 AM    BUN 16 01/15/2020 09:11 AM    Creatinine 0.80 01/15/2020 09:11 AM    BUN/Creatinine ratio 20 01/15/2020 09:11 AM    GFR est AA >60 01/15/2020 09:11 AM    GFR est non-AA >60 01/15/2020 09:11 AM    Calcium 9.4 01/15/2020 09:11 AM    Bilirubin, total 0.5 01/15/2020 09:11 AM    AST (SGOT) 62 (H) 01/15/2020 09:11 AM    Alk.  phosphatase 59 01/15/2020 09:11 AM    Protein, total 6.9 01/15/2020 09:11 AM    Albumin 3.9 01/15/2020 09:11 AM    Globulin 3.0 01/15/2020 09:11 AM    A-G Ratio 1.3 01/15/2020 09:11 AM    ALT (SGPT) 61 01/15/2020 09:11 AM        Lab Results   Component Value Date/Time    WBC 6.0 01/15/2020 09:11 AM    HGB 12.2 01/15/2020 09:11 AM    HCT 38.8 01/15/2020 09:11 AM    PLATELET 459 14/11/3555 09:11 AM    .6 (H) 01/15/2020 09:11 AM       Lab Results   Component Value Date/Time    TSH 1.78 01/15/2020 09:11 AM CARDIAC DIAGNOSTICS:     Cardiac Evaluation Includes:    EKG 3/1/19 - NSR, PVC     Echo 2/3/17 - LVEF 60%, mild LAE, Mild MR, mild AI     Echo 1/23/19 - I viewed echo myself - On my review - Normal LV size.  LVEF 55%, mild post leaflet prolapse and mild-mod MR, mild AI, Asc 3.8 cm      Echo 3/12/20 -  LVEF 58%, Mitral valve thickening. Myxomatous mitral valve disease. Mild mitral valve prolapse. Late systolic mitral valve prolapse. Moderate mitral valve regurgitation is present. Dilated sinuses of Valsalva; diameter is 3.5 cm. Dilated ascending aorta; diameter is 3.7 cm.           ASSESSMENT AND PLAN:      Assessment and Plan:  1) Mild MVP and mild-mod MR  - I viewed 1/19 echo images myself.  Very mild prolapse of posterior leaflet with at most mild-mod MR.  LVEF is normal.   - Echo 3/12/20 stable   - She is asymptomatic - check an echo yearly      2) Ascending aorta minimally dilated to upper normal size (3.8 cm) on echo 1/19  - follow on serial studies   - Aim for good BP control      3) HTN  - On 4/24/20 - her BP is doing better - continue meds.    4) See me in 6 months.  Patient expressed understanding of the plan - questions were answered.      She is originally from [facility]. Jd Elias has 3 boys (one is in [facility] and another in [facility]). [address] Espinosa MD, [address] [place], [address]      [address] [place] [address], [address] [place] [address]  Ph: [phone]                                [phone]             We discussed the expected course, resolution and complications of the diagnosis(es) in detail. Medication risks, benefits, costs, interactions, and alternatives were discussed as indicated. I advised her to contact the office if her condition worsens, changes or fails to improve as anticipated.  She expressed understanding with the diagnosis(es) and plan    Srinivasan Rosen MD    Greater than 20 minutes was spent in direct video patient care, planning and chart review. This visit was conducted using [employer] telemedicine services.

## 2020-07-01 DIAGNOSIS — E78.5 DYSLIPIDEMIA: ICD-10-CM

## 2020-07-01 DIAGNOSIS — I10 HTN, GOAL BELOW 130/80: ICD-10-CM

## 2020-07-01 RX ORDER — LOVASTATIN 20 MG/1
TABLET ORAL
Qty: 90 TAB | Refills: 1 | Status: SHIPPED | OUTPATIENT
Start: 2020-07-01 | End: 2020-12-30

## 2020-07-01 RX ORDER — ATENOLOL 50 MG/1
TABLET ORAL
Qty: 180 TAB | Refills: 1 | Status: SHIPPED | OUTPATIENT
Start: 2020-07-01 | End: 2020-12-30

## 2020-07-14 ENCOUNTER — VIRTUAL VISIT (OUTPATIENT)
Dept: INTERNAL MEDICINE CLINIC | Age: 70
End: 2020-07-14

## 2020-07-14 DIAGNOSIS — K76.0 NAFLD (NONALCOHOLIC FATTY LIVER DISEASE): ICD-10-CM

## 2020-07-14 DIAGNOSIS — I10 HTN, GOAL BELOW 130/80: Primary | ICD-10-CM

## 2020-07-14 DIAGNOSIS — I34.1 MITRAL VALVE PROLAPSE: ICD-10-CM

## 2020-07-14 DIAGNOSIS — E78.5 DYSLIPIDEMIA: ICD-10-CM

## 2020-07-14 NOTE — PROGRESS NOTES
Evan Wick is a 72 y.o. female    Chief Complaint   Patient presents with    Hypertension     6 mo f/u    Mitral Valve Prolapse/regurgitation/insufficiency

## 2020-07-14 NOTE — PROGRESS NOTES
Consent: Farrukh Beltran, who was seen by synchronous (real-time) audio-video technology, and/or her healthcare decision maker, is aware that this patient-initiated, Telehealth encounter on 7/14/2020 is a billable service, with coverage as determined by her insurance carrier. She is aware that she may receive a bill and has provided verbal consent to proceed: YES  712  Subjective:   Farrukh Beltran is a 72 y.o. female who was seen for Hypertension (6 mo f/u) and Mitral Valve Prolapse/regurgitation/insufficiency      Follow up visit. She is feeling generally well. Issues:  1. Hypertension. Blood pressure at home ranging 120-140, occasionally 150/61-85. Heart rate is consistently in the 60s. No headaches, dizzy spells, chest pain, shortness of breath or edema. 2. Mitral valve prolapse insufficiency. Did see Dr. July Mcnulty in the spring and had an echo, stable disease. She will see him in the fall. 3. Dyslipidemia. Remains on Lovastatin, no myalgias. Labs looked good in January. She prefers to hold off on doing blood today and will plan on repeat this coming winter. Discussed a flu shot in September with possibly a booster in January. Discussed increasing walking. Current Outpatient Medications   Medication Sig    lovastatin (MEVACOR) 20 mg tablet TAKE 1 TABLET BY MOUTH NIGHTLY    atenoloL (TENORMIN) 50 mg tablet TAKE 1 TABLET BY MOUTH TWO TIMES DAILY    amLODIPine (NORVASC) 2.5 mg tablet Take 1 Tab by mouth daily.  olmesartan-hydroCHLOROthiazide (BENICAR HCT) 20-12.5 mg per tablet Take 1 Tab by mouth daily.  magnesium oxide (MAG-OX) 400 mg tablet Take 400 mg by mouth daily.  TURMERIC ROOT EXTRACT PO Take  by mouth.  ferrous fumarate/vit Bcomp,C (SUPER B COMPLEX PO) Take  by mouth daily.  omega-3 fatty acids-vitamin e 1,000 mg cap Take 2 Caps by mouth daily.  Cholecalciferol, Vitamin D3, (VITAMIN D3) 2,000 unit cap capsule Take 2,000 Units by mouth daily.     multivitamin, tx-iron-ca-min (THERA-M W/ IRON) 9 mg iron-400 mcg tab tablet Take 1 Tab by mouth daily.  aspirin 81 mg chewable tablet Take 81 mg by mouth daily. No current facility-administered medications for this visit. Allergies   Allergen Reactions    Shellfish Derived Diarrhea and Nausea and Vomiting       Past Medical History:   Diagnosis Date    Dyslipidemia     HTN, goal below 130/80 11/9/2015    Hx of bone density study 06/08/2016    Osteopenic    Hx of mammogram 07/10/2017    Negtaive     Impaired glucose tolerance 11/9/2015    Mitral regurgitation     mild MR    Osteopenia 11/9/2015    Routine Papanicolaou smear 07/24/2018    Neg pap        ROS  All other systems reviewed and negative, unless mentioned in HPI    Objective:   Vital Signs: (As obtained by patient/caregiver at home)  There were no vitals taken for this visit.      [INSTRUCTIONS:  \"[x]\" Indicates a positive item  \"[]\" Indicates a negative item  -- DELETE ALL ITEMS NOT EXAMINED]    Constitutional: [x] Appears well-developed and well-nourished [x] No apparent distress      [] Abnormal -     Mental status: [x] Alert and awake  [x] Oriented to person/place/time [x] Able to follow commands    [] Abnormal -     Eyes:   EOM    [x]  Normal    [] Abnormal -   Sclera  [x]  Normal    [] Abnormal -          Discharge [x]  None visible   [] Abnormal -     HENT: [x] Normocephalic, atraumatic  [] Abnormal -       External Ears [x] Normal  [] Abnormal -    Neck: [x] No visualized mass [] Abnormal -     Pulmonary/Chest: [x] Respiratory effort normal   [x] No visualized signs of difficulty breathing or respiratory distress        [] Abnormal -      Musculoskeletal:            [x] Normal range of motion of neck        [] Abnormal -     Neurological:        [x] No Facial Asymmetry (Cranial nerve 7 motor function) (limited exam due to video visit)          [x] No gaze palsy        [] Abnormal -          Skin:        [x] No significant exanthematous lesions or discoloration noted on facial skin         [] Abnormal -            Psychiatric:       [x] Normal Affect [] Abnormal -           Other pertinent observable physical exam findings:-        Assessment & Plan:   Diagnoses and all orders for this visit:    1. HTN, goal below 130/80  Cont meds controlled  2. Dyslipidemia  Cont mevacor   Prefers to wait on labs now and will do again in 1/21  3. NAFLD (nonalcoholic fatty liver disease)    4. Mitral valve prolapse  Echo stable in 3/20 no sxs  Flu shot in sept          We discussed the expected course, resolution and complications of the diagnosis(es) in detail. Medication risks, benefits, costs, interactions, and alternatives were discussed as indicated. I advised her to contact the office if her condition worsens, changes or fails to improve as anticipated. She expressed understanding with the diagnosis(es) and plan. Antonio Patel is a 72 y.o. female being evaluated by a video visit encounter for concerns as above. A caregiver was present when appropriate. Due to this being a TeleHealth encounter (During [address] emergency), evaluation of the following organ systems was limited: Vitals/Constitutional/EENT/Resp/CV/GI//MS/Neuro/Skin/Heme-Lymph-Imm. Pursuant to the emergency declaration under the [facility], 1135 waiver authority and the [employer] Act, this Virtual  Visit was conducted, with patient's (and/or legal guardian's) consent, to reduce the patient's risk of exposure to COVID-19 and provide necessary medical care. Services were provided through a video synchronous discussion virtually to substitute for in-person clinic visit. Patient and provider were located at their individual homes.

## 2020-10-19 ENCOUNTER — TRANSCRIBE ORDER (OUTPATIENT)
Dept: SCHEDULING | Age: 70
End: 2020-10-19

## 2020-10-19 DIAGNOSIS — Z12.31 VISIT FOR SCREENING MAMMOGRAM: Primary | ICD-10-CM

## 2020-12-30 DIAGNOSIS — E78.5 DYSLIPIDEMIA: ICD-10-CM

## 2020-12-30 DIAGNOSIS — I10 HTN, GOAL BELOW 130/80: ICD-10-CM

## 2020-12-30 RX ORDER — ATENOLOL 50 MG/1
TABLET ORAL
Qty: 180 TAB | Refills: 1 | Status: SHIPPED | OUTPATIENT
Start: 2020-12-30 | End: 2021-06-30

## 2020-12-30 RX ORDER — LOVASTATIN 20 MG/1
TABLET ORAL
Qty: 90 TAB | Refills: 1 | Status: SHIPPED | OUTPATIENT
Start: 2020-12-30 | End: 2021-06-30

## 2021-01-19 ENCOUNTER — OFFICE VISIT (OUTPATIENT)
Dept: INTERNAL MEDICINE CLINIC | Age: 71
End: 2021-01-19
Payer: MEDICARE

## 2021-01-19 VITALS
HEART RATE: 62 BPM | TEMPERATURE: 97.8 F | BODY MASS INDEX: 21.97 KG/M2 | RESPIRATION RATE: 18 BRPM | HEIGHT: 63 IN | WEIGHT: 124 LBS | DIASTOLIC BLOOD PRESSURE: 74 MMHG | OXYGEN SATURATION: 98 % | SYSTOLIC BLOOD PRESSURE: 136 MMHG

## 2021-01-19 DIAGNOSIS — R73.01 IFG (IMPAIRED FASTING GLUCOSE): ICD-10-CM

## 2021-01-19 DIAGNOSIS — Z00.00 MEDICARE ANNUAL WELLNESS VISIT, SUBSEQUENT: ICD-10-CM

## 2021-01-19 DIAGNOSIS — D75.89 MACROCYTOSIS WITHOUT ANEMIA: ICD-10-CM

## 2021-01-19 DIAGNOSIS — K76.0 NAFLD (NONALCOHOLIC FATTY LIVER DISEASE): ICD-10-CM

## 2021-01-19 DIAGNOSIS — I34.1 MITRAL VALVE PROLAPSE: ICD-10-CM

## 2021-01-19 DIAGNOSIS — I10 HTN, GOAL BELOW 130/80: Primary | ICD-10-CM

## 2021-01-19 DIAGNOSIS — G60.9 IDIOPATHIC PERIPHERAL NEUROPATHY: ICD-10-CM

## 2021-01-19 PROCEDURE — G8752 SYS BP LESS 140: HCPCS | Performed by: INTERNAL MEDICINE

## 2021-01-19 PROCEDURE — G8510 SCR DEP NEG, NO PLAN REQD: HCPCS | Performed by: INTERNAL MEDICINE

## 2021-01-19 PROCEDURE — G8420 CALC BMI NORM PARAMETERS: HCPCS | Performed by: INTERNAL MEDICINE

## 2021-01-19 PROCEDURE — G8754 DIAS BP LESS 90: HCPCS | Performed by: INTERNAL MEDICINE

## 2021-01-19 PROCEDURE — G0463 HOSPITAL OUTPT CLINIC VISIT: HCPCS | Performed by: INTERNAL MEDICINE

## 2021-01-19 PROCEDURE — 99214 OFFICE O/P EST MOD 30 MIN: CPT | Performed by: INTERNAL MEDICINE

## 2021-01-19 PROCEDURE — 3017F COLORECTAL CA SCREEN DOC REV: CPT | Performed by: INTERNAL MEDICINE

## 2021-01-19 PROCEDURE — 1101F PT FALLS ASSESS-DOCD LE1/YR: CPT | Performed by: INTERNAL MEDICINE

## 2021-01-19 PROCEDURE — G8536 NO DOC ELDER MAL SCRN: HCPCS | Performed by: INTERNAL MEDICINE

## 2021-01-19 PROCEDURE — G8399 PT W/DXA RESULTS DOCUMENT: HCPCS | Performed by: INTERNAL MEDICINE

## 2021-01-19 PROCEDURE — G9899 SCRN MAM PERF RSLTS DOC: HCPCS | Performed by: INTERNAL MEDICINE

## 2021-01-19 PROCEDURE — G8427 DOCREV CUR MEDS BY ELIG CLIN: HCPCS | Performed by: INTERNAL MEDICINE

## 2021-01-19 PROCEDURE — 1090F PRES/ABSN URINE INCON ASSESS: CPT | Performed by: INTERNAL MEDICINE

## 2021-01-19 PROCEDURE — G0439 PPPS, SUBSEQ VISIT: HCPCS | Performed by: INTERNAL MEDICINE

## 2021-01-19 RX ORDER — BISMUTH SUBSALICYLATE 262 MG
1 TABLET,CHEWABLE ORAL DAILY
COMMUNITY

## 2021-01-19 NOTE — PATIENT INSTRUCTIONS
[phone] to schedule  A vaccine appt with [facility] in about 1 month consider calling to get a visit  I would recommend a visit with dr Jenifer Payton in the march for follow up  Medicare Wellness Visit, Female     The best way to live healthy is to have a lifestyle where you eat a well-balanced diet, exercise regularly, limit alcohol use, and quit all forms of tobacco/nicotine, if applicable. Regular preventive services are another way to keep healthy. Preventive services (vaccines, screening tests, monitoring & exams) can help personalize your care plan, which helps you manage your own care. Screening tests can find health problems at the earliest stages, when they are easiest to treat. [facility] follows the current, evidence-based guidelines published by the [facility] States Gary Cowan ([facility]) when recommending preventive services for our patients. Because we follow these guidelines, sometimes recommendations change over time as research supports it. (For example, mammograms used to be recommended annually. Even though Medicare will still pay for an annual mammogram, the newer guidelines recommend a mammogram every two years for women of average risk). Of course, you and your doctor may decide to screen more often for some diseases, based on your risk and your co-morbidities (chronic disease you are already diagnosed with). Preventive services for you include:  - Medicare offers their members a free annual wellness visit, which is time for you and your primary care provider to discuss and plan for your preventive service needs. Take advantage of this benefit every year!  -All adults over the age of 72 should receive the recommended pneumonia vaccines.  Current USPSTF guidelines recommend a series of two vaccines for the best pneumonia protection.   -All adults should have a flu vaccine yearly and a tetanus vaccine every 10 years.   -All adults age 48 and older should receive the shingles vaccines (series of two vaccines). -All adults age 38-68 who are overweight should have a diabetes screening test once every three years.   -All adults born between [birth year] and 1964 should be screened once for Hepatitis C.  -Other screening tests and preventive services for persons with diabetes include: an eye exam to screen for diabetic retinopathy, a kidney function test, a foot exam, and stricter control over your cholesterol.   -Cardiovascular screening for adults with routine risk involves an electrocardiogram (ECG) at intervals determined by your doctor.   -Colorectal cancer screenings should be done for adults age 54-65 with no increased risk factors for colorectal cancer. There are a number of acceptable methods of screening for this type of cancer. Each test has its own benefits and drawbacks. Discuss with your doctor what is most appropriate for you during your annual wellness visit. The different tests include: colonoscopy (considered the best screening method), a fecal occult blood test, a fecal DNA test, and sigmoidoscopy.    -A bone mass density test is recommended when a woman turns 65 to screen for osteoporosis. This test is only recommended one time, as a screening. Some providers will use this same test as a disease monitoring tool if you already have osteoporosis. -Breast cancer screenings are recommended every other year for women of normal risk, age 54-69.  -Cervical cancer screenings for women over age 72 are only recommended with certain risk factors.      Here is a list of your current Health Maintenance items (your personalized list of preventive services) with a due date:  Health Maintenance Due   Topic Date Due    Annual Well Visit  01/14/2021    Cholesterol Test   01/15/2021

## 2021-01-19 NOTE — PROGRESS NOTES
HISTORY OF PRESENT ILLNESS  Rebecca Dee is a 80 y.o. female. HPI  Seen for Medicare wellness visit and follow up on some issues:  1. Hypertension. Pressure is checked faithfully at home, ranging 115-143/60-70 with heart rate mostly in the 60s on current blood pressure pills. Feels well. 2. 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 Echo done in March. Denies dizzy spells, edema or dyspnea on exertion. Walks primarily on flat ground. 3. Dyslipidemia, on Lovastatin 20 mg. Due for labs. 4. Preventive care. Mammogram scheduled. Bone density due next January. We discussed COVID vaccine. Otherwise up to date. Review of Systems   Constitutional: Negative for chills, fever, malaise/fatigue and weight loss. HENT: Positive for hearing loss. Eyes: Negative for blurred vision. Respiratory: Negative for cough, shortness of breath and wheezing. Cardiovascular: Negative for chest pain, palpitations, orthopnea, leg swelling and PND. Gastrointestinal: Negative for abdominal pain, diarrhea, heartburn and nausea. Genitourinary: Negative for dysuria and urgency. Musculoskeletal: Negative for falls and myalgias. Neurological: Positive for sensory change. Negative for dizziness and headaches. Speech change: feet. Psychiatric/Behavioral: Negative for memory loss. All other systems reviewed and are negative. Physical Exam  Vitals signs and nursing note reviewed. Constitutional:       Appearance: She is well-developed. HENT:      Head: Normocephalic and atraumatic. Right Ear: Tympanic membrane and ear canal normal.      Left Ear: Tympanic membrane and ear canal normal.      Mouth/Throat:      Mouth: Mucous membranes are moist.   Eyes:      Extraocular Movements: Extraocular movements intact. Pupils: Pupils are equal, round, and reactive to light. Neck:      Musculoskeletal: Normal range of motion and neck supple. Thyroid: No thyromegaly. Vascular: No carotid bruit. Cardiovascular:      Rate and Rhythm: Normal rate and regular rhythm. Heart sounds: Normal heart sounds, S1 normal and S2 normal. No murmur. Pulmonary:      Effort: Pulmonary effort is normal. No respiratory distress. Breath sounds: Normal breath sounds. No wheezing or rales. Abdominal:      General: Bowel sounds are normal. There is no distension. Palpations: Abdomen is soft. There is no mass. Tenderness: There is no abdominal tenderness. Genitourinary:     Comments: Breast exam bilaterally without masses axillary nodes or discharge. BSE reviewed     Musculoskeletal:         General: No swelling. Right lower leg: No edema. Left lower leg: No edema. Comments: Some darkening of right great toenail   Lymphadenopathy:      Cervical: No cervical adenopathy. Skin:     Findings: No erythema or rash. Neurological:      General: No focal deficit present. Mental Status: She is alert and oriented to person, place, and time. Psychiatric:         Mood and Affect: Mood normal.         Behavior: Behavior normal.         ASSESSMENT and PLAN  Diagnoses and all orders for this visit:    1. HTN, goal below 686/86  -     METABOLIC PANEL, COMPREHENSIVE; Future    2. NAFLD (nonalcoholic fatty liver disease)  -     LIPID PANEL; Future    3. Mitral valve prolapse-no sxs advised appt with dr Ayaan Mcpherson this spring for follow up echo    4. Medicare annual wellness visit, subsequent    5. Idiopathic peripheral neuropathy  -     VITAMIN D, 25 HYDROXY; Future  -     TSH 3RD GENERATION; Future    6. IFG (impaired fasting glucose)  -     HEMOGLOBIN A1C WITH EAG; Future    7. Macrocytosis without anemia-b12 and tsh normal-follow  -     CBC WITH AUTOMATED DIFF;  Future    This is the Subsequent Medicare Annual Wellness Exam, performed 12 months or more after the Initial AWV or the last Subsequent AWV    I have reviewed the patient's medical history in detail and updated the computerized patient record. Depression Risk Factor Screening:     3 most recent PHQ Screens 1/19/2021   Little interest or pleasure in doing things Not at all   Feeling down, depressed, irritable, or hopeless Not at all   Total Score PHQ 2 0       Alcohol Risk Screen    Do you average more than 1 drink per night or more than 7 drinks a week:  No    On any one occasion in the past three months have you have had more than 3 drinks containing alcohol:  No        Functional Ability and Level of Safety:    Hearing: The patient needs further evaluation. Activities of Daily Living: The home contains: no safety equipment. Patient does total self care      Ambulation: with no difficulty     Fall Risk:  Fall Risk Assessment, last 12 mths 1/19/2021   Able to walk? Yes   Fall in past 12 months? 0   Do you feel unsteady? 0   Are you worried about falling 0      Abuse Screen:  Patient is not abused       Cognitive Screening    Has your family/caregiver stated any concerns about your memory: no     Cognitive Screening: Normal - Verbal Fluency Test    Assessment/Plan   Education and counseling provided:  Are appropriate based on today's review and evaluation  Pneumococcal Vaccine  Influenza Vaccine  Screening Mammography  Bone mass measurement (DEXA)  Screening for glaucoma    Diagnoses and all orders for this visit:    1. HTN, goal below 300/05  -     METABOLIC PANEL, COMPREHENSIVE; Future    2. NAFLD (nonalcoholic fatty liver disease)  -     LIPID PANEL; Future    3. Mitral valve prolapse    4. Medicare annual wellness visit, subsequent    5. Idiopathic peripheral neuropathy  -     VITAMIN D, 25 HYDROXY; Future  -     TSH 3RD GENERATION; Future    6. IFG (impaired fasting glucose)  -     HEMOGLOBIN A1C WITH EAG; Future    7. Macrocytosis without anemia  -     CBC WITH AUTOMATED DIFF;  Future        Health Maintenance Due     Health Maintenance Due   Topic Date Due    Medicare Yearly Exam  01/14/2021    Lipid Screen  01/15/2021 Patient Care Team   Patient Care Team:  Lou Sargent MD as PCP - General (Internal Medicine)  Lou Sargent MD as PCP - [facility] Provider  Lencho Woody MD (Cardiology)    History     Patient Active Problem List   Diagnosis Code    FHx: AAA [id number]    HTN, goal below 130/80 I10    Dyslipidemia E78.5    Impaired glucose tolerance R73.02    NAFLD (nonalcoholic fatty liver disease) K76.0    Osteopenia M85.80    Pyuria R82.81    Mitral regurgitation I34.0     Past Medical History:   Diagnosis Date    Dyslipidemia     HTN, goal below 130/80 2015    Hx of bone density study 2016    Osteopenic    Hx of mammogram 07/10/2017    Negtaive     Impaired glucose tolerance 2015    Mitral regurgitation     mild MR    Osteopenia 2015    Routine Papanicolaou smear 2018    Neg pap       Past Surgical History:   Procedure Laterality Date    HX APPENDECTOMY  1963    HX  SECTION      HX COLONOSCOPY      HX ORTHOPAEDIC  13    right foot     Current Outpatient Medications   Medication Sig Dispense Refill    multivitamin (ONE A DAY) tablet Take 1 Tab by mouth daily. Ultimate calcium and coq10      atenoloL (TENORMIN) 50 mg tablet TAKE 1 TABLET BY MOUTH TWO TIMES DAILY 180 Tab 1    lovastatin (MEVACOR) 20 mg tablet TAKE 1 TABLET BY MOUTH NIGHTLY 90 Tab 1    amLODIPine (NORVASC) 2.5 mg tablet Take 1 Tab by mouth daily. 90 Tab 3    olmesartan-hydroCHLOROthiazide (BENICAR HCT) 20-12.5 mg per tablet Take 1 Tab by mouth daily. 90 Tab 3    TURMERIC ROOT EXTRACT PO Take  by mouth.  ferrous fumarate/vit Bcomp,C (SUPER B COMPLEX PO) Take  by mouth daily.  omega-3 fatty acids-vitamin e 1,000 mg cap Take 2 Caps by mouth daily.  Cholecalciferol, Vitamin D3, (VITAMIN D3) 2,000 unit cap capsule Take 2,000 Units by mouth daily. 30 Cap 5    multivitamin, tx-iron-ca-min (THERA-M W/ IRON) 9 mg iron-400 mcg tab tablet Take 1 Tab by mouth daily.       aspirin 81 mg chewable tablet Take 81 mg by mouth daily.  magnesium oxide (MAG-OX) 400 mg tablet Take 400 mg by mouth daily. Allergies   Allergen Reactions    Shellfish Derived Diarrhea and Nausea and Vomiting       Family History   Problem Relation Age of Onset    Hypertension Father     Hypertension Sister      Social History     Tobacco Use    Smoking status: Former Smoker     Packs/day: 0.75     Years: 15.00     Pack years: 11.25     Quit date: 2012     Years since quittin.3    Smokeless tobacco: Never Used   Substance Use Topics    Alcohol use:  Yes     Alcohol/week: 0.0 standard drinks

## 2021-01-20 ENCOUNTER — PATIENT MESSAGE (OUTPATIENT)
Dept: INTERNAL MEDICINE CLINIC | Age: 71
End: 2021-01-20

## 2021-01-20 LAB
25(OH)D3 SERPL-MCNC: 65.8 NG/ML (ref 30–100)
ALBUMIN SERPL-MCNC: 4 G/DL (ref 3.5–5)
ALBUMIN/GLOB SERPL: 1.3 {RATIO} (ref 1.1–2.2)
ALP SERPL-CCNC: 61 U/L (ref 45–117)
ALT SERPL-CCNC: 45 U/L (ref 12–78)
ANION GAP SERPL CALC-SCNC: 5 MMOL/L (ref 5–15)
AST SERPL-CCNC: 39 U/L (ref 15–37)
BASOPHILS # BLD: 0.1 K/UL (ref 0–0.1)
BASOPHILS NFR BLD: 1 % (ref 0–1)
BILIRUB SERPL-MCNC: 0.4 MG/DL (ref 0.2–1)
BUN SERPL-MCNC: 12 MG/DL (ref 6–20)
BUN/CREAT SERPL: 16 (ref 12–20)
CALCIUM SERPL-MCNC: 9.8 MG/DL (ref 8.5–10.1)
CHLORIDE SERPL-SCNC: 101 MMOL/L (ref 97–108)
CHOLEST SERPL-MCNC: 143 MG/DL
CO2 SERPL-SCNC: 31 MMOL/L (ref 21–32)
CREAT SERPL-MCNC: 0.75 MG/DL (ref 0.55–1.02)
DIFFERENTIAL METHOD BLD: NORMAL
EOSINOPHIL # BLD: 0.2 K/UL (ref 0–0.4)
EOSINOPHIL NFR BLD: 2 % (ref 0–7)
ERYTHROCYTE [DISTWIDTH] IN BLOOD BY AUTOMATED COUNT: 11.8 % (ref 11.5–14.5)
EST. AVERAGE GLUCOSE BLD GHB EST-MCNC: 103 MG/DL
GLOBULIN SER CALC-MCNC: 3 G/DL (ref 2–4)
GLUCOSE SERPL-MCNC: 97 MG/DL (ref 65–100)
HBA1C MFR BLD: 5.2 % (ref 4–5.6)
HCT VFR BLD AUTO: 39 % (ref 35–47)
HDLC SERPL-MCNC: 67 MG/DL
HDLC SERPL: 2.1 {RATIO} (ref 0–5)
HGB BLD-MCNC: 12.9 G/DL (ref 11.5–16)
IMM GRANULOCYTES # BLD AUTO: 0 K/UL (ref 0–0.04)
IMM GRANULOCYTES NFR BLD AUTO: 0 % (ref 0–0.5)
LDLC SERPL CALC-MCNC: 54.6 MG/DL (ref 0–100)
LIPID PROFILE,FLP: NORMAL
LYMPHOCYTES # BLD: 2.4 K/UL (ref 0.8–3.5)
LYMPHOCYTES NFR BLD: 25 % (ref 12–49)
MCH RBC QN AUTO: 32.2 PG (ref 26–34)
MCHC RBC AUTO-ENTMCNC: 33.1 G/DL (ref 30–36.5)
MCV RBC AUTO: 97.3 FL (ref 80–99)
MONOCYTES # BLD: 0.8 K/UL (ref 0–1)
MONOCYTES NFR BLD: 9 % (ref 5–13)
NEUTS SEG # BLD: 5.9 K/UL (ref 1.8–8)
NEUTS SEG NFR BLD: 63 % (ref 32–75)
NRBC # BLD: 0 K/UL (ref 0–0.01)
NRBC BLD-RTO: 0 PER 100 WBC
PLATELET # BLD AUTO: 261 K/UL (ref 150–400)
PMV BLD AUTO: 10.8 FL (ref 8.9–12.9)
POTASSIUM SERPL-SCNC: 3.5 MMOL/L (ref 3.5–5.1)
PROT SERPL-MCNC: 7 G/DL (ref 6.4–8.2)
RBC # BLD AUTO: 4.01 M/UL (ref 3.8–5.2)
SODIUM SERPL-SCNC: 137 MMOL/L (ref 136–145)
TRIGL SERPL-MCNC: 107 MG/DL (ref ?–150)
TSH SERPL DL<=0.05 MIU/L-ACNC: 1.19 UIU/ML (ref 0.36–3.74)
VLDLC SERPL CALC-MCNC: 21.4 MG/DL
WBC # BLD AUTO: 9.4 K/UL (ref 3.6–11)

## 2021-01-21 ENCOUNTER — HOSPITAL ENCOUNTER (OUTPATIENT)
Dept: MAMMOGRAPHY | Age: 71
Discharge: HOME | End: 2021-01-21
Attending: INTERNAL MEDICINE
Payer: MEDICARE

## 2021-01-21 DIAGNOSIS — Z12.31 VISIT FOR SCREENING MAMMOGRAM: ICD-10-CM

## 2021-01-21 DIAGNOSIS — I34.1 MITRAL VALVE PROLAPSE: Primary | ICD-10-CM

## 2021-01-21 PROCEDURE — 77063 BREAST TOMOSYNTHESIS BI: CPT

## 2021-01-26 ENCOUNTER — IMMUNIZATION (OUTPATIENT)
Dept: INTERNAL MEDICINE CLINIC | Age: 71
End: 2021-01-26
Payer: MEDICARE

## 2021-01-26 DIAGNOSIS — Z23 ENCOUNTER FOR IMMUNIZATION: Primary | ICD-10-CM

## 2021-01-26 PROCEDURE — 91301 COVID-19, MRNA, LNP-S, PF, 100MCG/0.5ML DOSE(MODERNA): CPT | Performed by: FAMILY MEDICINE

## 2021-01-26 PROCEDURE — 0011A PR IMM ADMN SARSCOV2 100 MCG/0.5 ML 1ST DOSE: CPT | Performed by: FAMILY MEDICINE

## 2021-02-16 RX ORDER — AMLODIPINE BESYLATE 2.5 MG/1
2.5 TABLET ORAL DAILY
Qty: 90 TAB | Refills: 0 | Status: SHIPPED | OUTPATIENT
Start: 2021-02-16 | End: 2021-05-14 | Stop reason: SDUPTHER

## 2021-02-16 NOTE — TELEPHONE ENCOUNTER
Refill per VO of Dr. Ladn Patience:  Last appt: 4/24/20  Future Appointments   Date Time Provider Alfred Josei   2/23/2021 11:00 AM [facility] VAC INITIAL [facility] BS AMB   3/16/2021 10:30 AM ECHO LAB [facility]   3/17/2021  1:20 PM Agustin Ambrose MD [facility] BS AMB   7/20/2021  9:30 AM Lazaro Marin MD [facility] BS AMB       Requested Prescriptions     Signed Prescriptions Disp Refills    amLODIPine (NORVASC) 2.5 mg tablet 90 Tab 0     Sig: Take 1 Tab by mouth daily.      Authorizing Provider: Michael Persaud     Ordering User: Aye Brochure

## 2021-02-23 ENCOUNTER — IMMUNIZATION (OUTPATIENT)
Dept: INTERNAL MEDICINE CLINIC | Age: 71
End: 2021-02-23
Payer: MEDICARE

## 2021-02-23 DIAGNOSIS — Z23 ENCOUNTER FOR IMMUNIZATION: Primary | ICD-10-CM

## 2021-02-23 PROCEDURE — 0012A COVID-19, MRNA, LNP-S, PF, 100MCG/0.5ML DOSE(MODERNA): CPT | Performed by: FAMILY MEDICINE

## 2021-02-23 PROCEDURE — 91301 COVID-19, MRNA, LNP-S, PF, 100MCG/0.5ML DOSE(MODERNA): CPT | Performed by: FAMILY MEDICINE

## 2021-03-12 DIAGNOSIS — I10 HTN, GOAL BELOW 130/80: ICD-10-CM

## 2021-03-12 RX ORDER — OLMESARTAN MEDOXOMIL AND HYDROCHLOROTHIAZIDE 20/12.5 20; 12.5 MG/1; MG/1
1 TABLET ORAL DAILY
Qty: 90 TAB | Refills: 0 | Status: SHIPPED | OUTPATIENT
Start: 2021-03-12 | End: 2021-06-01

## 2021-03-12 NOTE — TELEPHONE ENCOUNTER
Refill per VO of Dr. Marivel Corley:  Last appt: Visit date not found  Future Appointments   Date Time Provider Alfred Lisset   3/16/2021 10:30 AM ECHO LAB [facility] [address]   3/17/2021  3:20 PM Javier Herrera MD [facility] BS AMB   7/20/2021  9:30 AM Neelam Nguyen MD [facility] BS AMB       Requested Prescriptions     Signed Prescriptions Disp Refills    olmesartan-hydroCHLOROthiazide (BENICAR HCT) 20-12.5 mg per tablet 90 Tab 0     Sig: Take 1 Tab by mouth daily.      Authorizing Provider: Hu Oviedo     Ordering User: Sarah Mess

## 2021-03-16 ENCOUNTER — HOSPITAL ENCOUNTER (OUTPATIENT)
Dept: NON INVASIVE DIAGNOSTICS | Age: 71
Discharge: HOME | End: 2021-03-16
Attending: INTERNAL MEDICINE
Payer: MEDICARE

## 2021-03-16 VITALS
DIASTOLIC BLOOD PRESSURE: 90 MMHG | SYSTOLIC BLOOD PRESSURE: 164 MMHG | HEIGHT: 63 IN | BODY MASS INDEX: 21.97 KG/M2 | WEIGHT: 124 LBS

## 2021-03-16 DIAGNOSIS — I34.1 MITRAL VALVE PROLAPSE: ICD-10-CM

## 2021-03-16 LAB
ECHO AO ASC DIAM: 3.58 CM
ECHO AO ROOT DIAM: 3.58 CM
ECHO AR MAX VEL PISA: 358.59 CENTIMETER/SECOND
ECHO AV AREA PEAK VELOCITY: 2.93 CM2
ECHO AV AREA VTI: 3.42 CM2
ECHO AV AREA/BSA PEAK VELOCITY: 1.9 CM2/M2
ECHO AV AREA/BSA VTI: 2.2 CM2/M2
ECHO AV MEAN GRADIENT: 3.07 MMHG
ECHO AV PEAK GRADIENT: 5.13 MMHG
ECHO AV PEAK VELOCITY: 113.2 CM/S
ECHO AV REGURGITANT PHT: 758.73 MS
ECHO AV VTI: 21.67 CM
ECHO IVC PROX: 1.24 CM
ECHO LA AREA 4C: 16.68 CM2
ECHO LA MAJOR AXIS: 3.65 CM
ECHO LA MINOR AXIS: 2.31 CM
ECHO LA VOL 2C: 37.7 ML (ref 22–52)
ECHO LA VOL 4C: 41.88 ML (ref 22–52)
ECHO LA VOL BP: 45.22 ML (ref 22–52)
ECHO LA VOL/BSA BIPLANE: 28.65 ML/M2 (ref 16–28)
ECHO LA VOLUME INDEX A2C: 23.89 ML/M2 (ref 16–28)
ECHO LA VOLUME INDEX A4C: 26.54 ML/M2 (ref 16–28)
ECHO LV E' LATERAL VELOCITY: 9.49 CENTIMETER/SECOND
ECHO LV E' SEPTAL VELOCITY: 7.8 CENTIMETER/SECOND
ECHO LV INTERNAL DIMENSION DIASTOLIC: 4.67 CM (ref 3.9–5.3)
ECHO LV INTERNAL DIMENSION SYSTOLIC: 3.22 CM
ECHO LV IVSD: 0.99 CM (ref 0.6–0.9)
ECHO LV MASS 2D: 160.5 G (ref 67–162)
ECHO LV MASS INDEX 2D: 101.7 G/M2 (ref 43–95)
ECHO LV POSTERIOR WALL DIASTOLIC: 0.99 CM (ref 0.6–0.9)
ECHO LVOT DIAM: 2.04 CM
ECHO LVOT PEAK GRADIENT: 4.15 MMHG
ECHO LVOT PEAK VELOCITY: 101.84 CM/S
ECHO LVOT SV: 74.1 ML
ECHO LVOT VTI: 22.76 CM
ECHO MV A VELOCITY: 86.74 CENTIMETER/SECOND
ECHO MV AREA PHT: 3.57 CM2
ECHO MV E DECELERATION TIME (DT): 212.4 MS
ECHO MV E VELOCITY: 81.37 CENTIMETER/SECOND
ECHO MV PRESSURE HALF TIME (PHT): 61.6 MS
ECHO PV MAX VELOCITY: 84.23 CM/S
ECHO PV PEAK INSTANTANEOUS GRADIENT SYSTOLIC: 2.84 MMHG
ECHO RV INTERNAL DIMENSION: 3.15 CM
ECHO RV TAPSE: 2.32 CM (ref 1.5–2)
ECHO TV REGURGITANT MAX VELOCITY: 246.63 CM/S
ECHO TV REGURGITANT PEAK GRADIENT: 24.33 MMHG
LA VOL DISK BP: 42.05 ML (ref 22–52)
LVOT MG: 2.02 MMHG

## 2021-03-16 PROCEDURE — 93306 TTE W/DOPPLER COMPLETE: CPT

## 2021-03-16 PROCEDURE — 93306 TTE W/DOPPLER COMPLETE: CPT | Performed by: INTERNAL MEDICINE

## 2021-03-17 ENCOUNTER — OFFICE VISIT (OUTPATIENT)
Dept: CARDIOLOGY CLINIC | Age: 71
End: 2021-03-17
Payer: MEDICARE

## 2021-03-17 VITALS
HEIGHT: 63 IN | HEART RATE: 67 BPM | BODY MASS INDEX: 22.68 KG/M2 | SYSTOLIC BLOOD PRESSURE: 150 MMHG | WEIGHT: 128 LBS | DIASTOLIC BLOOD PRESSURE: 68 MMHG | OXYGEN SATURATION: 97 %

## 2021-03-17 DIAGNOSIS — I34.0 NONRHEUMATIC MITRAL VALVE REGURGITATION: Primary | ICD-10-CM

## 2021-03-17 DIAGNOSIS — I10 HTN, GOAL BELOW 130/80: ICD-10-CM

## 2021-03-17 DIAGNOSIS — E78.5 DYSLIPIDEMIA: ICD-10-CM

## 2021-03-17 PROCEDURE — 1090F PRES/ABSN URINE INCON ASSESS: CPT | Performed by: SPECIALIST

## 2021-03-17 PROCEDURE — 3017F COLORECTAL CA SCREEN DOC REV: CPT | Performed by: SPECIALIST

## 2021-03-17 PROCEDURE — G8420 CALC BMI NORM PARAMETERS: HCPCS | Performed by: SPECIALIST

## 2021-03-17 PROCEDURE — 93005 ELECTROCARDIOGRAM TRACING: CPT | Performed by: SPECIALIST

## 2021-03-17 PROCEDURE — G0463 HOSPITAL OUTPT CLINIC VISIT: HCPCS | Performed by: SPECIALIST

## 2021-03-17 PROCEDURE — 99214 OFFICE O/P EST MOD 30 MIN: CPT | Performed by: SPECIALIST

## 2021-03-17 PROCEDURE — G8427 DOCREV CUR MEDS BY ELIG CLIN: HCPCS | Performed by: SPECIALIST

## 2021-03-17 PROCEDURE — 1101F PT FALLS ASSESS-DOCD LE1/YR: CPT | Performed by: SPECIALIST

## 2021-03-17 PROCEDURE — G8754 DIAS BP LESS 90: HCPCS | Performed by: SPECIALIST

## 2021-03-17 PROCEDURE — G8753 SYS BP > OR = 140: HCPCS | Performed by: SPECIALIST

## 2021-03-17 PROCEDURE — G8399 PT W/DXA RESULTS DOCUMENT: HCPCS | Performed by: SPECIALIST

## 2021-03-17 PROCEDURE — G9899 SCRN MAM PERF RSLTS DOC: HCPCS | Performed by: SPECIALIST

## 2021-03-17 PROCEDURE — G8432 DEP SCR NOT DOC, RNG: HCPCS | Performed by: SPECIALIST

## 2021-03-17 PROCEDURE — 93010 ELECTROCARDIOGRAM REPORT: CPT | Performed by: SPECIALIST

## 2021-03-17 PROCEDURE — G8536 NO DOC ELDER MAL SCRN: HCPCS | Performed by: SPECIALIST

## 2021-03-17 NOTE — PROGRESS NOTES
Her heart looks good and she sees cardiology today as well can you let her know the echo looks  good

## 2021-03-17 NOTE — PROGRESS NOTES
Fabian Marrero MD. [facility] [place]              Patient: Katie Stephens  : 1950      Today's Date: 3/17/2021          HISTORY OF PRESENT ILLNESS:     History of Present Illness:  Here for follow-up. She is doing OK overall. She has no complaints. No CP or SOB. PAST MEDICAL HISTORY:     Past Medical History:   Diagnosis Date    Dyslipidemia     HTN, goal below 130/80 2015    Hx of bone density study 2016    Osteopenic    Hx of mammogram 07/10/2017    Negtaive     Impaired glucose tolerance 2015    Mitral regurgitation     mild MR    Osteopenia 2015    Routine Papanicolaou smear 2018    Neg pap        Past Surgical History:   Procedure Laterality Date    HX APPENDECTOMY  1963    HX  SECTION      HX COLONOSCOPY      HX ORTHOPAEDIC  13    right foot         MEDICATIONS:     Current Outpatient Medications   Medication Sig Dispense Refill    olmesartan-hydroCHLOROthiazide (BENICAR HCT) 20-12.5 mg per tablet Take 1 Tab by mouth daily. 90 Tab 0    amLODIPine (NORVASC) 2.5 mg tablet Take 1 Tab by mouth daily. 90 Tab 0    multivitamin (ONE A DAY) tablet Take 1 Tab by mouth daily. Ultimate calcium and coq10      atenoloL (TENORMIN) 50 mg tablet TAKE 1 TABLET BY MOUTH TWO TIMES DAILY 180 Tab 1    lovastatin (MEVACOR) 20 mg tablet TAKE 1 TABLET BY MOUTH NIGHTLY 90 Tab 1    TURMERIC ROOT EXTRACT PO Take  by mouth.  ferrous fumarate/vit Bcomp,C (SUPER B COMPLEX PO) Take  by mouth daily.  omega-3 fatty acids-vitamin e 1,000 mg cap Take 2 Caps by mouth daily.  Cholecalciferol, Vitamin D3, (VITAMIN D3) 2,000 unit cap capsule Take 2,000 Units by mouth daily. 30 Cap 5    aspirin 81 mg chewable tablet Take 81 mg by mouth daily.  magnesium oxide (MAG-OX) 400 mg tablet Take 400 mg by mouth daily.  multivitamin, tx-iron-ca-min (THERA-M W/ IRON) 9 mg iron-400 mcg tab tablet Take 1 Tab by mouth daily.          Allergies   Allergen Reactions    Shellfish Derived Diarrhea and Nausea and Vomiting           SOCIAL HISTORY:     Social History     Tobacco Use    Smoking status: Former Smoker     Packs/day: 0.75     Years: 15.00     Pack years: 11.25     Quit date: 2012     Years since quittin.5    Smokeless tobacco: Never Used   Substance Use Topics    Alcohol use: Yes     Alcohol/week: 0.0 standard drinks    Drug use: No         FAMILY HISTORY:     Family History   Problem Relation Age of Onset    Hypertension Father     Hypertension Sister            REVIEW OF SYMPTOMS:     Review of Symptoms:  Constitutional: Negative for fever, chills  HEENT: Negative for nosebleeds, tinnitus, and vision changes. Respiratory: Negative for cough, wheezing  Cardiovascular: Negative for orthopnea, claudication, syncope, and PND. Gastrointestinal: Negative for abdominal pain, diarrhea, melena. Genitourinary: Negative for dysuria  Musculoskeletal: Negative for myalgias. Skin: Negative for rash  Heme: No problems bleeding. Neurological: Negative for speech change and focal weakness. PHYSICAL EXAM:     Physical Exam:  Visit Vitals  BP (!) 150/68 (BP 1 Location: Left upper arm, BP Patient Position: Sitting, BP Cuff Size: Adult)   Pulse 67   Ht 5' 3\" (1.6 m)   Wt 128 lb (58.1 kg)   SpO2 97%   BMI 22.67 kg/m²     Patient appears generally well, mood and affect are appropriate and pleasant. HEENT:  Hearing intact, non-icteric, normocephalic, atraumatic. Neck Exam: Supple, No JVD or carotid bruits. Lung Exam: Clear to auscultation, even breath sounds. Cardiac Exam: Regular rate and rhythm with no murmur or rub  Abdomen: Soft, non-tender, normal bowel sounds. No bruits or masses. Extremities: Moves all ext well. No lower extremity edema. MSKTL: Overall good ROM ext  Skin: No significant rashes  Vascular: 2+ dorsalis pedis pulses bilaterally.   Psych: Appropriate affect  Neuro - Grossly intact        LABS / OTHER STUDIES reviewed: Lab Results   Component Value Date/Time    Sodium 137 01/19/2021 09:35 AM    Potassium 3.5 01/19/2021 09:35 AM    Chloride 101 01/19/2021 09:35 AM    CO2 31 01/19/2021 09:35 AM    Anion gap 5 01/19/2021 09:35 AM    Glucose 97 01/19/2021 09:35 AM    BUN 12 01/19/2021 09:35 AM    Creatinine 0.75 01/19/2021 09:35 AM    BUN/Creatinine ratio 16 01/19/2021 09:35 AM    GFR est AA >60 01/19/2021 09:35 AM    GFR est non-AA >60 01/19/2021 09:35 AM    Calcium 9.8 01/19/2021 09:35 AM    Bilirubin, total 0.4 01/19/2021 09:35 AM    Alk. phosphatase 61 01/19/2021 09:35 AM    Protein, total 7.0 01/19/2021 09:35 AM    Albumin 4.0 01/19/2021 09:35 AM    Globulin 3.0 01/19/2021 09:35 AM    A-G Ratio 1.3 01/19/2021 09:35 AM    ALT (SGPT) 45 01/19/2021 09:35 AM    AST (SGOT) 39 (H) 01/19/2021 09:35 AM     Lab Results   Component Value Date/Time    WBC 9.4 01/19/2021 09:35 AM    HGB 12.9 01/19/2021 09:35 AM    HCT 39.0 01/19/2021 09:35 AM    PLATELET 711 37/59/0109 09:35 AM    MCV 97.3 01/19/2021 09:35 AM       Lab Results   Component Value Date/Time    Cholesterol, total 143 01/19/2021 09:35 AM    HDL Cholesterol 67 01/19/2021 09:35 AM    LDL, calculated 54.6 01/19/2021 09:35 AM    VLDL, calculated 21.4 01/19/2021 09:35 AM    Triglyceride 107 01/19/2021 09:35 AM    CHOL/HDL Ratio 2.1 01/19/2021 09:35 AM       Lab Results   Component Value Date/Time    TSH 1.19 01/19/2021 09:35 AM             CARDIAC DIAGNOSTICS:     Cardiac Evaluation Includes:  I reviewed the results below. EKG 3/1/19 - NSR, PVC     Echo 2/3/17 - LVEF 60%, mild LAE, Mild MR, mild AI     Echo 1/23/19 - I viewed echo myself - On my review - Normal LV size.  LVEF 55%, mild post leaflet prolapse and mild-mod MR, mild AI, Asc 3.8 cm      Echo 3/12/20 -  LVEF 58%, Mitral valve thickening. Myxomatous mitral valve disease. Mild mitral valve prolapse. Late systolic mitral valve prolapse.  Moderate mitral valve regurgitation is present.  Dilated sinuses of Valsalva; diameter is 3.5 cm. Dilated ascending aorta; diameter is 3.7 cm.     Echo 3/17/21 - LVEF 55-60%. Mild MVP and mild MR. Ao 3.58         ASSESSMENT AND PLAN:      Assessment and Plan:  1) Mild MVP and mild-mod MR  - I viewed 1/19 echo images myself.  Very mild prolapse of posterior leaflet with at most mild-mod MR.  LVEF is normal.   - Echo 2021 stable   - She is asymptomatic - check an echo yearly      2) Ascending aorta minimally dilated to upper normal size (3.8 cm) on echo 1/19  - follow on serial studies   - Aim for good BP control   - follow on echoes      3) HTN  - Her BP is usually good at home - BP < 130/80  - continue meds and follow BP at home      4) See me in 12 months.  Patient expressed understanding of the plan - questions were answered.      She is originally from [facility] [place] [facility].  She has 3 boys (one is in [facility] and another in [facility]). [address], [address]  [address] [facility], [address]      [address] [facility] [address]  [address]  Ph: [phone]                                [phone]       ADDENDUM   11/22/2021  Dr. Zuleika Churchill that his mom went into Afib a few nights ago ('s) - converted to NSR overnight on extra atenolol. He started her on Eliquis. Will send an event monitor to see how much Afib she is having and how well HR is controlled. Will check an echo with her visit with me soon. Agree with Eliquis 5 mg BID--- would stop ASA.

## 2021-03-17 NOTE — PROGRESS NOTES
Chief Complaint   Patient presents with    Annual Exam    Hypertension    Cholesterol Problem    Abdominal Aortic Aneurysm     Visit Vitals  BP (!) 150/68 (BP 1 Location: Left upper arm, BP Patient Position: Sitting, BP Cuff Size: Adult)   Pulse 67   Ht 5' 3\" (1.6 m)   Wt 128 lb (58.1 kg)   SpO2 97%   BMI 22.67 kg/m²     Chest pain denied   SOB denied   Palpitations denied   Swelling in hands/feet denied   Dizziness denied   Recent hospital stays denied   Refills denied     Echo yesterday in hospital.  Dr. Leidy Bernal called today with normal results.

## 2021-05-14 RX ORDER — AMLODIPINE BESYLATE 2.5 MG/1
2.5 TABLET ORAL DAILY
Qty: 90 TAB | Refills: 3 | Status: SHIPPED | OUTPATIENT
Start: 2021-05-14 | End: 2022-05-09 | Stop reason: SDUPTHER

## 2021-05-14 NOTE — TELEPHONE ENCOUNTER
Refill per VO of Dr. Lopez Credit:  Last appt: 3/17/2021  Future Appointments   Date Time Provider Alfred Darling   7/20/2021  9:30 AM Kane Valentin MD [facility] BS AMB   3/22/2022 10:00 AM JONATHAN LIZ BS [facility]   3/22/2022 10:40 AM Julianne Najjar, MD [facility] BS AMB       Requested Prescriptions     Signed Prescriptions Disp Refills    amLODIPine (NORVASC) 2.5 mg tablet 90 Tab 3     Sig: Take 1 Tab by mouth daily.      Authorizing Provider: Cali Munguia User: Nolan Carpenter

## 2021-05-31 DIAGNOSIS — I10 HTN, GOAL BELOW 130/80: ICD-10-CM

## 2021-06-01 RX ORDER — OLMESARTAN MEDOXOMIL AND HYDROCHLOROTHIAZIDE 20/12.5 20; 12.5 MG/1; MG/1
TABLET ORAL
Qty: 90 TABLET | Refills: 3 | Status: SHIPPED | OUTPATIENT
Start: 2021-06-01 | End: 2022-05-31

## 2021-06-01 NOTE — TELEPHONE ENCOUNTER
Per Dr. Doris Malone VO:  LOV:3/17/2021  Future Appointments   Date Time Provider Alfred Josei   7/20/2021  9:30 AM Dread Cedeno MD [facility] BS AMB   3/22/2022 10:00 AM JONATHAN LIZ BS AMB   3/22/2022 10:40 AM Henry Patel MD [facility] BS AMB     Requested Prescriptions     Pending Prescriptions Disp Refills    olmesartan-hydroCHLOROthiazide (BENICAR HCT) 20-12.5 mg per tablet [Pharmacy Med Name: OLMESARTAN-HCTZ 20-12.5 MG TAB] 90 Tablet 0     Sig: TAKE 1 TABLET BY MOUTH EVERY DAY

## 2021-06-30 DIAGNOSIS — E78.5 DYSLIPIDEMIA: ICD-10-CM

## 2021-06-30 DIAGNOSIS — I10 HTN, GOAL BELOW 130/80: ICD-10-CM

## 2021-06-30 RX ORDER — ATENOLOL 50 MG/1
TABLET ORAL
Qty: 180 TABLET | Refills: 1 | Status: SHIPPED | OUTPATIENT
Start: 2021-06-30 | End: 2021-12-27

## 2021-06-30 RX ORDER — LOVASTATIN 20 MG/1
TABLET ORAL
Qty: 90 TABLET | Refills: 1 | Status: SHIPPED | OUTPATIENT
Start: 2021-06-30 | End: 2021-12-27

## 2021-07-20 ENCOUNTER — OFFICE VISIT (OUTPATIENT)
Dept: INTERNAL MEDICINE CLINIC | Age: 71
End: 2021-07-20
Payer: MEDICARE

## 2021-07-20 VITALS
WEIGHT: 125.2 LBS | OXYGEN SATURATION: 96 % | TEMPERATURE: 97.6 F | BODY MASS INDEX: 22.18 KG/M2 | SYSTOLIC BLOOD PRESSURE: 142 MMHG | HEART RATE: 70 BPM | RESPIRATION RATE: 16 BRPM | HEIGHT: 63 IN | DIASTOLIC BLOOD PRESSURE: 80 MMHG

## 2021-07-20 DIAGNOSIS — I34.0 NONRHEUMATIC MITRAL VALVE REGURGITATION: ICD-10-CM

## 2021-07-20 DIAGNOSIS — E78.5 DYSLIPIDEMIA: ICD-10-CM

## 2021-07-20 DIAGNOSIS — I10 HTN, GOAL BELOW 130/80: Primary | ICD-10-CM

## 2021-07-20 PROCEDURE — G8399 PT W/DXA RESULTS DOCUMENT: HCPCS | Performed by: INTERNAL MEDICINE

## 2021-07-20 PROCEDURE — G9899 SCRN MAM PERF RSLTS DOC: HCPCS | Performed by: INTERNAL MEDICINE

## 2021-07-20 PROCEDURE — 1090F PRES/ABSN URINE INCON ASSESS: CPT | Performed by: INTERNAL MEDICINE

## 2021-07-20 PROCEDURE — G8420 CALC BMI NORM PARAMETERS: HCPCS | Performed by: INTERNAL MEDICINE

## 2021-07-20 PROCEDURE — G8753 SYS BP > OR = 140: HCPCS | Performed by: INTERNAL MEDICINE

## 2021-07-20 PROCEDURE — G8754 DIAS BP LESS 90: HCPCS | Performed by: INTERNAL MEDICINE

## 2021-07-20 PROCEDURE — G0463 HOSPITAL OUTPT CLINIC VISIT: HCPCS | Performed by: INTERNAL MEDICINE

## 2021-07-20 PROCEDURE — G8510 SCR DEP NEG, NO PLAN REQD: HCPCS | Performed by: INTERNAL MEDICINE

## 2021-07-20 PROCEDURE — G8427 DOCREV CUR MEDS BY ELIG CLIN: HCPCS | Performed by: INTERNAL MEDICINE

## 2021-07-20 PROCEDURE — 99214 OFFICE O/P EST MOD 30 MIN: CPT | Performed by: INTERNAL MEDICINE

## 2021-07-20 PROCEDURE — 3017F COLORECTAL CA SCREEN DOC REV: CPT | Performed by: INTERNAL MEDICINE

## 2021-07-20 PROCEDURE — 1101F PT FALLS ASSESS-DOCD LE1/YR: CPT | Performed by: INTERNAL MEDICINE

## 2021-07-20 PROCEDURE — G8536 NO DOC ELDER MAL SCRN: HCPCS | Performed by: INTERNAL MEDICINE

## 2021-07-20 NOTE — PROGRESS NOTES
HISTORY OF PRESENT ILLNESS  Shilo Saleem is a 80 y.o. female. HPI  Six month follow up, doing very well. Continues to be active, independent, recently had closet structures, guardado, and has been involved in repairing this and painting walls. Issues:  1. Hypertension, on Atenolol 50, Olmesartan 20/12.5 and Amlodipine 2.5. No constipation, no edema, no chest pain or shortness of breath. Echo in March showed mild mitral and aortic regurge, but EF of 55-60%. No syncopal spells. 2. Dyslipidemia, on Lovastatin 20. No myalgias. Review of Systems   Constitutional: Negative for chills, fever, malaise/fatigue and weight loss. Respiratory: Negative for cough, shortness of breath and wheezing. Cardiovascular: Negative for chest pain, palpitations, orthopnea, leg swelling and PND. Gastrointestinal: Negative for abdominal pain, heartburn and nausea. Musculoskeletal: Negative for myalgias. Neurological: Negative for dizziness, loss of consciousness and headaches. Physical Exam  Vitals and nursing note reviewed. Constitutional:       Appearance: She is well-developed. HENT:      Head: Normocephalic and atraumatic. Neck:      Thyroid: No thyromegaly. Vascular: No carotid bruit. Cardiovascular:      Rate and Rhythm: Normal rate and regular rhythm. Heart sounds: S1 normal and S2 normal. Murmur heard. Pulmonary:      Effort: Pulmonary effort is normal. No respiratory distress. Breath sounds: Normal breath sounds. No wheezing or rales. Musculoskeletal:      Cervical back: Normal range of motion and neck supple. Right lower leg: No edema. Left lower leg: No edema. Neurological:      Mental Status: She is alert and oriented to person, place, and time. Psychiatric:         Behavior: Behavior normal.         ASSESSMENT and PLAN  Diagnoses and all orders for this visit:    1. HTN, goal below 130/80  bp at home [id number] systolic will cont current meds  2. Dyslipidemia-tolerates mevacor well  -     METABOLIC PANEL, COMPREHENSIVE; Future  -     LIPID PANEL; Future    3.  Nonrheumatic mitral valve regurgitation-ef good in 3/21 and no sxs from mild mr and ar  Cont annual echo

## 2021-07-26 LAB
ALBUMIN SERPL-MCNC: 4.1 G/DL (ref 3.5–5)
ALBUMIN/GLOB SERPL: 1.5 {RATIO} (ref 1.1–2.2)
ALP SERPL-CCNC: 56 U/L (ref 45–117)
ALT SERPL-CCNC: 54 U/L (ref 12–78)
ANION GAP SERPL CALC-SCNC: 4 MMOL/L (ref 5–15)
AST SERPL-CCNC: 52 U/L (ref 15–37)
BILIRUB SERPL-MCNC: 0.7 MG/DL (ref 0.2–1)
BUN SERPL-MCNC: 14 MG/DL (ref 6–20)
BUN/CREAT SERPL: 21 (ref 12–20)
CALCIUM SERPL-MCNC: 9 MG/DL (ref 8.5–10.1)
CHLORIDE SERPL-SCNC: 103 MMOL/L (ref 97–108)
CHOLEST SERPL-MCNC: 185 MG/DL
CO2 SERPL-SCNC: 30 MMOL/L (ref 21–32)
CREAT SERPL-MCNC: 0.66 MG/DL (ref 0.55–1.02)
GLOBULIN SER CALC-MCNC: 2.8 G/DL (ref 2–4)
GLUCOSE SERPL-MCNC: 103 MG/DL (ref 65–100)
HDLC SERPL-MCNC: 88 MG/DL
HDLC SERPL: 2.1 {RATIO} (ref 0–5)
LDLC SERPL CALC-MCNC: 73 MG/DL (ref 0–100)
POTASSIUM SERPL-SCNC: 4 MMOL/L (ref 3.5–5.1)
PROT SERPL-MCNC: 6.9 G/DL (ref 6.4–8.2)
SODIUM SERPL-SCNC: 137 MMOL/L (ref 136–145)
TRIGL SERPL-MCNC: 120 MG/DL (ref ?–150)
VLDLC SERPL CALC-MCNC: 24 MG/DL

## 2021-11-18 ENCOUNTER — TELEPHONE (OUTPATIENT)
Dept: INTERNAL MEDICINE CLINIC | Age: 71
End: 2021-11-18

## 2021-11-18 NOTE — TELEPHONE ENCOUNTER
Per [facility] , patient just left their office, requesting a hearing testing order to be faxed tot [facility] at fax # [phone]

## 2021-11-22 ENCOUNTER — TELEPHONE (OUTPATIENT)
Dept: CARDIOLOGY CLINIC | Age: 71
End: 2021-11-22

## 2021-11-22 DIAGNOSIS — R06.02 SOB (SHORTNESS OF BREATH): ICD-10-CM

## 2021-11-22 DIAGNOSIS — I49.9 IRREGULAR HEART RATE: ICD-10-CM

## 2021-11-22 DIAGNOSIS — R00.0 TACHYCARDIA: Primary | ICD-10-CM

## 2021-11-22 NOTE — TELEPHONE ENCOUNTER
Jenni from Dr. Leidy Bernal office([address]) called to schedule an appt for the patient the first week of December. States that the patient has a new case of AFIB.        (first available is 12/17)      Phone: [phone]

## 2021-11-23 NOTE — TELEPHONE ENCOUNTER
Called pt,  Per Dr. Gloria Kong: [facility] - can you please call MsKatie Anahi Pelayo and give her a heads up on the monitor. Also, can you send in Rx for eliquis 5 mg BID. She should stop her aspirin. Can you please also add an echo to her next visit. \"    Was able to schedule her for echo and ov tomorrow.

## 2021-11-24 ENCOUNTER — OFFICE VISIT (OUTPATIENT)
Dept: CARDIOLOGY CLINIC | Age: 71
End: 2021-11-24
Payer: MEDICARE

## 2021-11-24 ENCOUNTER — ANCILLARY PROCEDURE (OUTPATIENT)
Dept: CARDIOLOGY CLINIC | Age: 71
End: 2021-11-24
Payer: MEDICARE

## 2021-11-24 VITALS
WEIGHT: 120 LBS | BODY MASS INDEX: 21.26 KG/M2 | SYSTOLIC BLOOD PRESSURE: 130 MMHG | DIASTOLIC BLOOD PRESSURE: 80 MMHG | HEIGHT: 63 IN

## 2021-11-24 VITALS
OXYGEN SATURATION: 100 % | SYSTOLIC BLOOD PRESSURE: 130 MMHG | HEART RATE: 55 BPM | HEIGHT: 63 IN | WEIGHT: 120 LBS | BODY MASS INDEX: 21.26 KG/M2 | DIASTOLIC BLOOD PRESSURE: 80 MMHG

## 2021-11-24 DIAGNOSIS — I49.9 IRREGULAR HEART RATE: ICD-10-CM

## 2021-11-24 DIAGNOSIS — R06.02 SOB (SHORTNESS OF BREATH): ICD-10-CM

## 2021-11-24 DIAGNOSIS — I34.0 NONRHEUMATIC MITRAL VALVE REGURGITATION: ICD-10-CM

## 2021-11-24 DIAGNOSIS — R00.0 TACHYCARDIA: ICD-10-CM

## 2021-11-24 DIAGNOSIS — I48.0 PAF (PAROXYSMAL ATRIAL FIBRILLATION) (HCC): Primary | ICD-10-CM

## 2021-11-24 LAB
ECHO AO ROOT DIAM: 2.62 CM
ECHO AV AREA PEAK VELOCITY: 2.9 CM2
ECHO AV AREA VTI: 2.85 CM2
ECHO AV AREA/BSA PEAK VELOCITY: 1.9 CM2/M2
ECHO AV AREA/BSA VTI: 1.8 CM2/M2
ECHO AV MEAN GRADIENT: 2.56 MMHG
ECHO AV PEAK GRADIENT: 4.24 MMHG
ECHO AV PEAK VELOCITY: 102.97 CM/S
ECHO AV VTI: 21.41 CM
ECHO EST RA PRESSURE: 3 MMHG
ECHO LA AREA 4C: 21.7 CM2
ECHO LA MAJOR AXIS: 3.95 CM
ECHO LA MINOR AXIS: 2.53 CM
ECHO LA VOL 2C: 58.54 ML (ref 22–52)
ECHO LA VOL 4C: 58.93 ML (ref 22–52)
ECHO LA VOL BP: 65.31 ML (ref 22–52)
ECHO LA VOL/BSA BIPLANE: 41.87 ML/M2 (ref 16–28)
ECHO LA VOLUME INDEX A2C: 37.53 ML/M2 (ref 16–28)
ECHO LA VOLUME INDEX A4C: 37.78 ML/M2 (ref 16–28)
ECHO LV E' LATERAL VELOCITY: 7.24 CM/S
ECHO LV E' SEPTAL VELOCITY: 6.48 CM/S
ECHO LV EDV A2C: 74.47 ML
ECHO LV EDV A4C: 95.24 ML
ECHO LV EDV BP: 85.84 ML (ref 56–104)
ECHO LV EDV INDEX A4C: 61.1 ML/M2
ECHO LV EDV INDEX BP: 55 ML/M2
ECHO LV EDV NDEX A2C: 47.7 ML/M2
ECHO LV EJECTION FRACTION A2C: 55 PERCENT
ECHO LV EJECTION FRACTION A4C: 61 PERCENT
ECHO LV EJECTION FRACTION BIPLANE: 59.1 PERCENT (ref 55–100)
ECHO LV ESV A2C: 33.51 ML
ECHO LV ESV A4C: 36.88 ML
ECHO LV ESV BP: 35.13 ML (ref 19–49)
ECHO LV ESV INDEX A2C: 21.5 ML/M2
ECHO LV ESV INDEX A4C: 23.6 ML/M2
ECHO LV ESV INDEX BP: 22.5 ML/M2
ECHO LV INTERNAL DIMENSION DIASTOLIC: 5.17 CM (ref 3.9–5.3)
ECHO LV INTERNAL DIMENSION SYSTOLIC: 3.61 CM
ECHO LV IVSD: 0.76 CM (ref 0.6–0.9)
ECHO LV MASS 2D: 140.4 G (ref 67–162)
ECHO LV MASS INDEX 2D: 90 G/M2 (ref 43–95)
ECHO LV POSTERIOR WALL DIASTOLIC: 0.81 CM (ref 0.6–0.9)
ECHO LVOT DIAM: 1.96 CM
ECHO LVOT PEAK GRADIENT: 3.94 MMHG
ECHO LVOT PEAK VELOCITY: 99.21 CM/S
ECHO LVOT SV: 61 ML
ECHO LVOT VTI: 20.26 CM
ECHO MV A VELOCITY: 78.18 CM/S
ECHO MV AREA PHT: 3.69 CM2
ECHO MV E DECELERATION TIME (DT): 205.63 MS
ECHO MV E VELOCITY: 81.32 CM/S
ECHO MV E/A RATIO: 1.04
ECHO MV E/E' LATERAL: 11.23
ECHO MV E/E' RATIO (AVERAGED): 11.89
ECHO MV E/E' SEPTAL: 12.55
ECHO MV EROA PISA: 0.23 CM2
ECHO MV PRESSURE HALF TIME (PHT): 59.63 MS
ECHO MV REGURGITANT RADIUS PISA: 0.8 CM
ECHO MV REGURGITANT VOLUME: 34.55 ML
ECHO MV REGURGITANT VTIA: 149.98 CM
ECHO RA AREA 4C: 15.99 CM2
ECHO RIGHT VENTRICULAR SYSTOLIC PRESSURE (RVSP): 19.64 MMHG
ECHO RV INTERNAL DIMENSION: 3.09 CM
ECHO RV TAPSE: 2.16 CM (ref 1.5–2)
ECHO TV REGURGITANT MAX VELOCITY: 203.97 CM/S
ECHO TV REGURGITANT PEAK GRADIENT: 16.64 MMHG
LA VOL DISK BP: 60.79 ML (ref 22–52)
MR PISA PV: 646.95 CM/S

## 2021-11-24 PROCEDURE — 93306 TTE W/DOPPLER COMPLETE: CPT | Performed by: SPECIALIST

## 2021-11-24 PROCEDURE — 99215 OFFICE O/P EST HI 40 MIN: CPT | Performed by: SPECIALIST

## 2021-11-24 PROCEDURE — 93010 ELECTROCARDIOGRAM REPORT: CPT | Performed by: SPECIALIST

## 2021-11-24 PROCEDURE — 1090F PRES/ABSN URINE INCON ASSESS: CPT | Performed by: SPECIALIST

## 2021-11-24 PROCEDURE — 93005 ELECTROCARDIOGRAM TRACING: CPT | Performed by: SPECIALIST

## 2021-11-24 NOTE — PROGRESS NOTES
Erica Negron MD. [facility] [place]              Patient: Vicki Castaneda  : 1950      Today's Date: 2021          HISTORY OF PRESENT ILLNESS:     History of Present Illness: On 21 - Dr. Milton Israeli that his mom went into Afib a few nights ago ('s) - converted to NSR overnight on extra atenolol. He started her on Eliquis. He had a rapid HR again day after but normal since then. Has been compliant with her atenolol. PAST MEDICAL HISTORY:     Past Medical History:   Diagnosis Date    Dyslipidemia     HTN, goal below 130/80 2015    Hx of bone density study 2016    Osteopenic    Hx of mammogram 07/10/2017    Negtaive     Impaired glucose tolerance 2015    Mitral regurgitation     mild MR    Osteopenia 2015    Routine Papanicolaou smear 2018    Neg pap        Past Surgical History:   Procedure Laterality Date    HX APPENDECTOMY  1963    HX  SECTION      HX COLONOSCOPY      HX ORTHOPAEDIC  13    right foot         MEDICATIONS:     Current Outpatient Medications   Medication Sig Dispense Refill    apixaban (ELIQUIS) 5 mg tablet Take 1 Tablet by mouth two (2) times a day. 180 Tablet 3    atenoloL (TENORMIN) 50 mg tablet TAKE 1 TABLET BY MOUTH TWO TIMES DAILY 180 Tablet 1    lovastatin (MEVACOR) 20 mg tablet TAKE 1 TABLET BY MOUTH NIGHTLY 90 Tablet 1    olmesartan-hydroCHLOROthiazide (BENICAR HCT) 20-12.5 mg per tablet TAKE 1 TABLET BY MOUTH EVERY DAY 90 Tablet 3    amLODIPine (NORVASC) 2.5 mg tablet Take 1 Tab by mouth daily. 90 Tab 3    multivitamin (ONE A DAY) tablet Take 1 Tab by mouth daily. Ultimate calcium and coq10      TURMERIC ROOT EXTRACT PO Take  by mouth.  ferrous fumarate/vit Bcomp,C (SUPER B COMPLEX PO) Take  by mouth daily.  omega-3 fatty acids-vitamin e 1,000 mg cap Take 2 Caps by mouth daily.       Cholecalciferol, Vitamin D3, (VITAMIN D3) 2,000 unit cap capsule Take 2,000 Units by mouth daily. 30 Cap 5       Allergies   Allergen Reactions    Shellfish Derived Diarrhea and Nausea and Vomiting           SOCIAL HISTORY:     Social History     Tobacco Use    Smoking status: Former Smoker     Packs/day: 0.75     Years: 15.00     Pack years: 11.25     Quit date: 2012     Years since quittin.2    Smokeless tobacco: Never Used   Vaping Use    Vaping Use: Never used   Substance Use Topics    Alcohol use: Yes     Alcohol/week: 0.0 standard drinks    Drug use: No         FAMILY HISTORY:     Family History   Problem Relation Age of Onset    Hypertension Father     Hypertension Sister            REVIEW OF SYMPTOMS:     Review of Symptoms:  Constitutional: Negative for fever, chills  HEENT: Negative for nosebleeds, tinnitus, and vision changes. Respiratory: Negative for cough, wheezing  Cardiovascular: Negative for orthopnea, claudication, syncope, and PND. Gastrointestinal: Negative for abdominal pain, diarrhea, melena. Genitourinary: Negative for dysuria  Musculoskeletal: Negative for myalgias. Skin: Negative for rash  Heme: No problems bleeding. Neurological: Negative for speech change and focal weakness. PHYSICAL EXAM:     Physical Exam:  Visit Vitals  /80   Pulse (!) 55   Ht 5' 3\" (1.6 m)   Wt 120 lb (54.4 kg)   SpO2 100%   BMI 21.26 kg/m²     Patient appears generally well, mood and affect are appropriate and pleasant. HEENT:  Hearing intact, non-icteric, normocephalic, atraumatic. Neck Exam: Supple, No JVD  Lung Exam: Clear to auscultation, even breath sounds. Cardiac Exam: Regular rate and rhythm with 2/6 systolic murmur at apex   Abdomen: Soft, non-tender   Extremities: Moves all ext well. No lower extremity edema.   MSKTL: Overall good ROM ext  Skin: No significant rashes  Psych: Appropriate affect  Neuro - Grossly intact        LABS / OTHER STUDIES reviewed:     Lab Results   Component Value Date/Time    Sodium 137 2021 08:39 AM    Potassium 4.0 07/26/2021 08:39 AM    Chloride 103 07/26/2021 08:39 AM    CO2 30 07/26/2021 08:39 AM    Anion gap 4 (L) 07/26/2021 08:39 AM    Glucose 103 (H) 07/26/2021 08:39 AM    BUN 14 07/26/2021 08:39 AM    Creatinine 0.66 07/26/2021 08:39 AM    BUN/Creatinine ratio 21 (H) 07/26/2021 08:39 AM    GFR est AA >60 07/26/2021 08:39 AM    GFR est non-AA >60 07/26/2021 08:39 AM    Calcium 9.0 07/26/2021 08:39 AM    Bilirubin, total 0.7 07/26/2021 08:39 AM    Alk. phosphatase 56 07/26/2021 08:39 AM    Protein, total 6.9 07/26/2021 08:39 AM    Albumin 4.1 07/26/2021 08:39 AM    Globulin 2.8 07/26/2021 08:39 AM    A-G Ratio 1.5 07/26/2021 08:39 AM    ALT (SGPT) 54 07/26/2021 08:39 AM    AST (SGOT) 52 (H) 07/26/2021 08:39 AM     Lab Results   Component Value Date/Time    WBC 9.4 01/19/2021 09:35 AM    HGB 12.9 01/19/2021 09:35 AM    HCT 39.0 01/19/2021 09:35 AM    PLATELET 307 65/46/0858 09:35 AM    MCV 97.3 01/19/2021 09:35 AM       Lab Results   Component Value Date/Time    Cholesterol, total 185 07/26/2021 08:39 AM    HDL Cholesterol 88 07/26/2021 08:39 AM    LDL, calculated 73 07/26/2021 08:39 AM    VLDL, calculated 24 07/26/2021 08:39 AM    Triglyceride 120 07/26/2021 08:39 AM    CHOL/HDL Ratio 2.1 07/26/2021 08:39 AM       Lab Results   Component Value Date/Time    TSH 1.19 01/19/2021 09:35 AM             CARDIAC DIAGNOSTICS:     Cardiac Evaluation Includes:  I reviewed the results below. EKG 3/1/19 - NSR, PVC  EKG - Apple Watch 11/20/21 - Afib,   EKG 11/24/21 - sinus elba, normal        Echo 2/3/17 - LVEF 60%, mild LAE, Mild MR, mild AI    Stress Echo 2/20/17 - walked 5:40 (10 METS), normal stress EKG and stress Echo      Echo 1/23/19 - I viewed echo myself - On my review - Normal LV size.  LVEF 55%, mild post leaflet prolapse and mild-mod MR, mild AI, Asc 3.8 cm      Echo 3/12/20 -  LVEF 58%, Mitral valve thickening. Myxomatous mitral valve disease. Mild mitral valve prolapse. Late systolic mitral valve prolapse. Moderate mitral valve regurgitation is present.  Dilated sinuses of Valsalva; diameter is 3.5 cm. Dilated ascending aorta; diameter is 3.7 cm.     Echo 3/17/21 - LVEF 55-60%. Mild MVP and mild MR. Ao 3.58     Echo 11/24/21 - LVEF 60%, MVP with moderate MR        ASSESSMENT AND PLAN:      Assessment and Plan:  1) PAF  - she had Afib one time in distant past in setting of PNA  - On 11/22/21 - Dr. Oswald Dixon that his mom went into Afib a few nights ago ('s) - converted to NSR overnight on extra atenolol. He started her on Eliquis. - Will send an event monitor to see how much Afib she is having and how well HR is controlled. She is also getting a [employer] Mobile Device.    - Looks like she has had a couple of spells of Afib with RVR despite high dose atenolol  ---> If her event monitor shows more spells of Afib, then would plan to evaluate her MV more closely (with a NATHALY) and consider MV repair if she has severe MR.    --->  I will also plug her into see Dr. Farrukh Lofton in a couple of months to have EP on board in case we need to proceed with Afib ablation down the road   - Will check an exercise cardiolite to rule out obstructive CAD --> need to rule out CAD so that we could add flecainide if needed   - Continue with Eliquis 5 mg BID    2) Mild MVP and mild-mod MR  - I viewed 1/19 echo images myself.  Very mild prolapse of posterior leaflet with at most mild-mod MR.  LVEF is normal.   - Echo 11/24/21 with mod MR ----> if she is having frequent episodes of PAF, then would plan a NATHALY to see if she has severe MR and needs MV surgery      3) Ascending aorta minimally dilated to upper normal size (3.8 cm) on echo 1/19  - follow on serial studies   - Aim for good BP control   - follow on echoes      4) HTN  - Her BP is usually good at home - BP < 130/80  - continue meds and follow BP at home      5) See me in 6 weeks.  Patient expressed understanding of the plan - questions were answered.      She is originally from [facility]. Xavier Aggarwal has 3 boys (one is in [facility] and another in [facility]). [address], [address] [place]  [address] [facility], [address]      [address] [facility] [address]  Gurwinder Granadoss John, [address] [place] [address]  Ph: [phone]                               [phone]      Total time (preparing to see the patient, reviewing data, seeing patient face to fine, counseling patient, documenting information, etc) was 45  min.       ADDENDUM   12/10/2021      Exercise Cardiolite 12/10/21 - Walked 5:54 min (7 METS). Normal stress EKG. Normal MPI. LVEF 70%    I called patient       ADDENDUM   1/9/2022  30 day Event Monitor 12/4/21 - NSR, HR , Avg 64 bpm.  Single 6 beat run of NSVT. No Afib. No Afib - will see her soon. Continue medical management as is. Consider NATHALY to look at MR if she has more afib or symptoms. She is to see EP eventually also.

## 2021-11-24 NOTE — PROGRESS NOTES
Identified pt with two pt identifiers(name and ). Reviewed record in preparation for visit and have obtained necessary documentation. Chief Complaint   Patient presents with    Follow-up        Vitals:    21 1146   BP: 130/80   Pulse: (!) 55   SpO2: 100%   Weight: 120 lb (54.4 kg)   Height: 5' 3\" (1.6 m)   PainSc:   0 - No pain       Health Maintenance Due   Topic    DTaP/Tdap/Td series (2 - Td or Tdap)    Colorectal Cancer Screening Combo     Flu Vaccine (1)       Coordination of Care Questionnaire:  :   1) Have you been to an emergency room, urgent care, or hospitalized since your last visit? If yes, where when, and reason for visit? No      2. Have seen or consulted any other health care provider since your last visit? If yes, where when, and reason for visit?   No

## 2021-12-02 NOTE — ADDENDUM NOTE
Addended by: Connor Mackey on: 12/2/2021 12:27 PM     Modules accepted: Orders Quality 111:Pneumonia Vaccination Status For Older Adults: Pneumococcal Vaccination not Administered or Previously Received, Reason not Otherwise Specified Quality 134: Screening For Clinical Depression And Follow-Up Plan: The patient was screened for depression and the screen was positive and follow up plan documented Quality 226: Preventive Care And Screening: Tobacco Use: Screening And Cessation Intervention: Patient screened for tobacco use and is an ex/non-smoker Quality 110: Preventive Care And Screening: Influenza Immunization: Influenza Immunization Administered during Influenza season Quality 47: Advance Care Plan: Advance care planning not documented, reason not otherwise specified. Quality 431: Preventive Care And Screening: Unhealthy Alcohol Use - Screening: Unhealthy alcohol use screening not performed, reason not otherwise specified Quality 130: Documentation Of Current Medications In The Medical Record: Current Medications Documented Detail Level: Detailed

## 2021-12-02 NOTE — PROGRESS NOTES
Orders for Exercise cardiolite when possible   See Dr. Temitope Ren when possible in next couple of months   See me in 6 weeks per Dr. Antonino No VO.   Dx: paf, mvp, mr, dilated aa

## 2021-12-09 ENCOUNTER — ANCILLARY PROCEDURE (OUTPATIENT)
Dept: CARDIOLOGY CLINIC | Age: 71
End: 2021-12-09
Payer: MEDICARE

## 2021-12-09 VITALS — WEIGHT: 120 LBS | BODY MASS INDEX: 21.26 KG/M2 | HEIGHT: 63 IN

## 2021-12-09 DIAGNOSIS — I34.0 NONRHEUMATIC MITRAL VALVE REGURGITATION: ICD-10-CM

## 2021-12-09 DIAGNOSIS — I49.9 IRREGULAR HEART RATE: ICD-10-CM

## 2021-12-09 DIAGNOSIS — I48.0 PAF (PAROXYSMAL ATRIAL FIBRILLATION) (HCC): ICD-10-CM

## 2021-12-09 PROCEDURE — 93017 CV STRESS TEST TRACING ONLY: CPT | Performed by: SPECIALIST

## 2021-12-09 PROCEDURE — A9500 TC99M SESTAMIBI: HCPCS | Performed by: SPECIALIST

## 2021-12-09 RX ORDER — TETRAKIS(2-METHOXYISOBUTYLISOCYANIDE)COPPER(I) TETRAFLUOROBORATE 1 MG/ML
23.5 INJECTION, POWDER, LYOPHILIZED, FOR SOLUTION INTRAVENOUS ONCE
Status: COMPLETED | OUTPATIENT
Start: 2021-12-09 | End: 2021-12-09

## 2021-12-09 RX ORDER — TETRAKIS(2-METHOXYISOBUTYLISOCYANIDE)COPPER(I) TETRAFLUOROBORATE 1 MG/ML
8.2 INJECTION, POWDER, LYOPHILIZED, FOR SOLUTION INTRAVENOUS ONCE
Status: COMPLETED | OUTPATIENT
Start: 2021-12-09 | End: 2021-12-09

## 2021-12-09 RX ADMIN — TECHNETIUM TC 99M SESTAMIBI 23.5 MILLICURIE: 1 INJECTION, POWDER, FOR SOLUTION INTRAVENOUS at 14:14

## 2021-12-09 RX ADMIN — TECHNETIUM TC 99M SESTAMIBI 8.2 MILLICURIE: 1 INJECTION, POWDER, FOR SOLUTION INTRAVENOUS at 12:35

## 2021-12-10 LAB
STRESS ANGINA INDEX: 0
STRESS BASELINE DIAS BP: 82 MMHG
STRESS BASELINE HR: 71 BPM
STRESS BASELINE SYS BP: 160 MMHG
STRESS ESTIMATED WORKLOAD: 7 METS
STRESS EXERCISE DUR MIN: NORMAL
STRESS O2 SAT PEAK: 96 %
STRESS O2 SAT REST: 98 %
STRESS PEAK DIAS BP: 74 MMHG
STRESS PEAK SYS BP: 194 MMHG
STRESS PERCENT HR ACHIEVED: 85 %
STRESS POST PEAK HR: 127 BPM
STRESS RATE PRESSURE PRODUCT: NORMAL BPM*MMHG
STRESS ST DEPRESSION: 0 MM
STRESS ST ELEVATION: 0 MM
STRESS TARGET HR: 150 BPM

## 2021-12-10 PROCEDURE — 93018 CV STRESS TEST I&R ONLY: CPT | Performed by: SPECIALIST

## 2021-12-10 PROCEDURE — 78452 HT MUSCLE IMAGE SPECT MULT: CPT | Performed by: SPECIALIST

## 2021-12-10 PROCEDURE — 93016 CV STRESS TEST SUPVJ ONLY: CPT | Performed by: SPECIALIST

## 2021-12-15 DIAGNOSIS — I49.9 IRREGULAR HEART RATE: ICD-10-CM

## 2021-12-15 DIAGNOSIS — R06.02 SOB (SHORTNESS OF BREATH): ICD-10-CM

## 2021-12-15 DIAGNOSIS — R00.0 TACHYCARDIA: ICD-10-CM

## 2021-12-27 DIAGNOSIS — I10 HTN, GOAL BELOW 130/80: ICD-10-CM

## 2021-12-27 DIAGNOSIS — E78.5 DYSLIPIDEMIA: ICD-10-CM

## 2021-12-27 RX ORDER — LOVASTATIN 20 MG/1
TABLET ORAL
Qty: 90 TABLET | Refills: 1 | Status: SHIPPED | OUTPATIENT
Start: 2021-12-27 | End: 2022-06-23

## 2021-12-27 RX ORDER — ATENOLOL 50 MG/1
TABLET ORAL
Qty: 180 TABLET | Refills: 1 | Status: SHIPPED | OUTPATIENT
Start: 2021-12-27 | End: 2022-06-23

## 2022-01-13 ENCOUNTER — VIRTUAL VISIT (OUTPATIENT)
Dept: CARDIOLOGY CLINIC | Age: 72
End: 2022-01-13
Payer: MEDICARE

## 2022-01-13 DIAGNOSIS — I48.0 PAF (PAROXYSMAL ATRIAL FIBRILLATION) (HCC): Primary | ICD-10-CM

## 2022-01-13 DIAGNOSIS — I34.0 NONRHEUMATIC MITRAL VALVE REGURGITATION: ICD-10-CM

## 2022-01-13 PROCEDURE — 99214 OFFICE O/P EST MOD 30 MIN: CPT | Performed by: SPECIALIST

## 2022-01-13 PROCEDURE — 1090F PRES/ABSN URINE INCON ASSESS: CPT | Performed by: SPECIALIST

## 2022-01-13 PROCEDURE — G8756 NO BP MEASURE DOC: HCPCS | Performed by: SPECIALIST

## 2022-01-13 PROCEDURE — G8420 CALC BMI NORM PARAMETERS: HCPCS | Performed by: SPECIALIST

## 2022-01-13 PROCEDURE — G0463 HOSPITAL OUTPT CLINIC VISIT: HCPCS | Performed by: SPECIALIST

## 2022-01-13 PROCEDURE — 3017F COLORECTAL CA SCREEN DOC REV: CPT | Performed by: SPECIALIST

## 2022-01-13 PROCEDURE — G8432 DEP SCR NOT DOC, RNG: HCPCS | Performed by: SPECIALIST

## 2022-01-13 PROCEDURE — G8536 NO DOC ELDER MAL SCRN: HCPCS | Performed by: SPECIALIST

## 2022-01-13 PROCEDURE — G8427 DOCREV CUR MEDS BY ELIG CLIN: HCPCS | Performed by: SPECIALIST

## 2022-01-13 PROCEDURE — 1101F PT FALLS ASSESS-DOCD LE1/YR: CPT | Performed by: SPECIALIST

## 2022-01-13 PROCEDURE — G9899 SCRN MAM PERF RSLTS DOC: HCPCS | Performed by: SPECIALIST

## 2022-01-13 PROCEDURE — G8399 PT W/DXA RESULTS DOCUMENT: HCPCS | Performed by: SPECIALIST

## 2022-01-13 NOTE — PROGRESS NOTES
Brenda Redding MD. [place] [id number] [employer] [facility], [address] [place] [address] [phone]; Fax [phone]        Patient: Disha Morrison  : 1950      Today's Date: 2022        [facility] Cardiology Telemedicine Encounter                                                         Pursuant to the emergency declaration under the [address] [place] [address] waiver authority and the [employer] Act, this Virtual  Visit was conducted, with patient's consent, to reduce the patient's risk of exposure to COVID-19 and provide continuity of care for an established patient. Services were provided through a video synchronous discussion virtually to substitute for in-person clinic visit. HISTORY OF PRESENT ILLNESS:     History of Present Illness:    Disha Morrison is a 71 y.o. female who was seen by synchronous (real-time) audio-video technology on 2022. Had recent cough - doing better. No recent heart racing. BP has been OK. Other than cough she feels \"perfectly normal\".          PAST MEDICAL HISTORY:     Past Medical History:   Diagnosis Date    Dyslipidemia     HTN, goal below 130/80 2015    Hx of bone density study 2016    Osteopenic    Hx of mammogram 07/10/2017    Negtaive     Impaired glucose tolerance 2015    Mitral regurgitation     mild MR    Osteopenia 2015    PAF (paroxysmal atrial fibrillation) (HCC)     Routine Papanicolaou smear 2018    Neg pap            Past Surgical History:   Procedure Laterality Date    HX APPENDECTOMY  1963    HX  SECTION      HX COLONOSCOPY      HX ORTHOPAEDIC  13    right foot           Patient Active Problem List   Diagnosis Code    FHx: AAA [id number]    HTN, goal below 130/80 I10    Dyslipidemia E78.5    Impaired glucose tolerance R73.02    NAFLD (nonalcoholic fatty liver disease) K76.0    Osteopenia M85.80    Pyuria R82.81    Mitral regurgitation I34.0    PAF (paroxysmal atrial fibrillation) ([facility]) I48.0             MEDICATIONS:     Current Outpatient Medications   Medication Sig Dispense Refill    atenoloL (TENORMIN) 50 mg tablet TAKE 1 TABLET BY MOUTH TWO TIMES DAILY 180 Tablet 1    lovastatin (MEVACOR) 20 mg tablet TAKE 1 TABLET BY MOUTH NIGHTLY 90 Tablet 1    apixaban (ELIQUIS) 5 mg tablet Take 1 Tablet by mouth two (2) times a day. 180 Tablet 3    olmesartan-hydroCHLOROthiazide (BENICAR HCT) 20-12.5 mg per tablet TAKE 1 TABLET BY MOUTH EVERY DAY 90 Tablet 3    amLODIPine (NORVASC) 2.5 mg tablet Take 1 Tab by mouth daily. 90 Tab 3    multivitamin (ONE A DAY) tablet Take 1 Tab by mouth daily. Ultimate calcium and coq10      TURMERIC ROOT EXTRACT PO Take  by mouth.  ferrous fumarate/vit Bcomp,C (SUPER B COMPLEX PO) Take  by mouth daily.  omega-3 fatty acids-vitamin e 1,000 mg cap Take 2 Caps by mouth daily.  Cholecalciferol, Vitamin D3, (VITAMIN D3) 2,000 unit cap capsule Take 2,000 Units by mouth daily. 30 Cap 5           Allergies   Allergen Reactions    Shellfish Derived Diarrhea and Nausea and Vomiting               SOCIAL HISTORY:     Social History     Tobacco Use    Smoking status: Former Smoker     Packs/day: 0.75     Years: 15.00     Pack years: 11.25     Quit date: 2012     Years since quittin.3    Smokeless tobacco: Never Used   Vaping Use    Vaping Use: Never used   Substance Use Topics    Alcohol use: Yes     Alcohol/week: 0.0 standard drinks    Drug use: No               FAMILY HISTORY:     Family History   Problem Relation Age of Onset    Hypertension Father     Hypertension Sister                REVIEW OF SYMPTOMS:     Review of Symptoms:  Constitutional: Negative for fever, chills  HEENT: Negative for nosebleeds, vision changes.    Respiratory: Negative for cough, wheezing  Cardiovascular: Negative for claudication, syncope  Gastrointestinal: Negative for abdominal pain, diarrhea, melena. Genitourinary: Negative for dysuria  Musculoskeletal: Negative for myalgias. Skin: Negative for rash  Heme: No problems bleeding. Neurological: Negative for speech change and focal weakness. PHYSICAL EXAM:       Physical Exam:    Due to this being a TeleHealth evaluation, many elements of the physical examination are unable to be assessed. General: Well developed, in no acute distress, cooperative and alert  HEENT: Hearing intact, non-icteric, normocephalic, atraumatic. Pupils equal/round. Moist mucous membranes. Lungs / Respiratory: No audible wheezing, no signs of respiratory distress, lips non cyanotic  Cardiac: No marked JVD visible on video. Extremities:  No edema  Neuro: A&Ox3, speech clear, no facial droop, answering questions appropriately  Skin: Skin color is normal. No rashes or lesions. Non diaphoretic on visible skin during exam  Psych: Appropriate affect         LABS / OTHER STUDIES reviewed:         Lab Results   Component Value Date/Time    Cholesterol, total 185 07/26/2021 08:39 AM    HDL Cholesterol 88 07/26/2021 08:39 AM    LDL, calculated 73 07/26/2021 08:39 AM    Triglyceride 120 07/26/2021 08:39 AM    CHOL/HDL Ratio 2.1 07/26/2021 08:39 AM       Lab Results   Component Value Date/Time    Sodium 137 07/26/2021 08:39 AM    Potassium 4.0 07/26/2021 08:39 AM    Chloride 103 07/26/2021 08:39 AM    CO2 30 07/26/2021 08:39 AM    Anion gap 4 (L) 07/26/2021 08:39 AM    Glucose 103 (H) 07/26/2021 08:39 AM    BUN 14 07/26/2021 08:39 AM    Creatinine 0.66 07/26/2021 08:39 AM    BUN/Creatinine ratio 21 (H) 07/26/2021 08:39 AM    GFR est AA >60 07/26/2021 08:39 AM    GFR est non-AA >60 07/26/2021 08:39 AM    Calcium 9.0 07/26/2021 08:39 AM    Bilirubin, total 0.7 07/26/2021 08:39 AM    Alk.  phosphatase 56 07/26/2021 08:39 AM    Protein, total 6.9 07/26/2021 08:39 AM    Albumin 4.1 07/26/2021 08:39 AM    Globulin 2.8 07/26/2021 08:39 AM    A-G Ratio 1.5 07/26/2021 08:39 AM    ALT (SGPT) 54 07/26/2021 08:39 AM        Lab Results   Component Value Date/Time    WBC 9.4 01/19/2021 09:35 AM    HGB 12.9 01/19/2021 09:35 AM    HCT 39.0 01/19/2021 09:35 AM    PLATELET 785 15/47/7637 09:35 AM    MCV 97.3 01/19/2021 09:35 AM       Lab Results   Component Value Date/Time    TSH 1.19 01/19/2021 09:35 AM               CARDIAC DIAGNOSTICS:      Cardiac Evaluation Includes:    I reviewed the results below. EKG 3/1/19 - NSR, PVC  EKG - Apple Watch 11/20/21 - Afib,   EKG 11/24/21 - sinus elba, normal         Echo 2/3/17 - LVEF 60%, mild LAE, Mild MR, mild AI     Stress Echo 2/20/17 - walked 5:40 (10 METS), normal stress EKG and stress Echo      Echo 1/23/19 - I viewed echo myself - On my review - Normal LV size.  LVEF 55%, mild post leaflet prolapse and mild-mod MR, mild AI, Asc 3.8 cm      Echo 3/12/20 -  LVEF 58%, Mitral valve thickening. Myxomatous mitral valve disease. Mild mitral valve prolapse. Late systolic mitral valve prolapse. Moderate mitral valve regurgitation is present.  Dilated sinuses of Valsalva; diameter is 3.5 cm. Dilated ascending aorta; diameter is 3.7 cm.     Echo 3/17/21 - LVEF 55-60%. Mild MVP and mild MR. Ao 3.58      Echo 11/24/21 - LVEF 60%, MVP with moderate MR, mild LAE     Exercise Cardiolite 12/10/21 - Walked 5:54 min (7 METS). Normal stress EKG. Normal MPI. LVEF 70%     30 day Event Monitor 12/4/21 - NSR, HR , Avg 64 bpm.  Single 6 beat run of NSVT. No Afib.            ASSESSMENT AND PLAN:     Assessment and Plan:    1) PAF  - she had Afib one time in distant past in setting of PNA  - On 11/22/21 - Dr. Mirna Lewis that his mom went into Afib a few nights ago ('s) - converted to NSR overnight on extra atenolol. He started her on Eliquis.   Sounds like she has had a couple of spells of Afib with RVR despite high dose atenolol  - 30 day Event monitor 12/21 was normal (no Afib)   - On 1/13/22 she says she feels perfectly fine.  ---> For now will continue atenolol and OAC. If she has more Afib, then would plan to evaluate her MV more closely (with a NATHALY) and consider MV repair if she has severe MR. Could also start an AAD then and consider Afib ablation (with or without valve repair accordingly)   - Continue with Eliquis 5 mg BID     2) Mild MVP and mild-mod MR  - I viewed 1/19 echo images myself.  Very mild prolapse of posterior leaflet with at most mild-mod MR.  LVEF is normal.   - Echo 11/24/21 with mod MR and mild LAE   - On 1/13/22 she says she is feeling \"perfectly fine\"   ----> if she has more episodes of AF, then would plan a NATHALY to see if she has severe MR and needs MV surgery (as MR could be underlying cause for her Afib). - will plan to check an echo every 6 months      3) Ascending aorta minimally dilated to upper normal size (3.8 cm) on echo 1/19  - follow on serial studies   - Aim for good BP control   - follow on echoes      4) HTN  - Her BP is usually good at home - BP < 130/80  - continue meds and follow BP at home      5) See me in 6 months with an echo.  Patient expressed understanding of the plan - questions were answered.      She is originally from [facility] [place].  She has 3 boys (one is in [facility] and another in [facility]).        ADDENDUM   6/21/2022  Echo 6/21/22 - LVEF 55-60%, AV sclerosis. Mitral Valve: Mild leaflet prolapse noted of the posterior leaflet. Moderate regurgitation. Mild LAE. Mildly dilated ascending aorta, 3.8cm. Will send a message - echo shows stable findings       Tita Riley MD, [address], [address]  [place] [address] [address], [address]  Ph: [phone]   Ph [phone]          We discussed the expected course, resolution and complications of the diagnosis(es) in detail.   Medication risks, benefits, costs, interactions, and alternatives were discussed as indicated. I advised her to contact the office if her condition worsens, changes or fails to improve as anticipated. She expressed understanding with the diagnosis(es) and plan    Elyse Llamas MD    This visit was conducted using [employer] telemedicine services.

## 2022-03-20 PROBLEM — R82.81 PYURIA: Status: ACTIVE | Noted: 2017-02-04

## 2022-03-31 ENCOUNTER — OFFICE VISIT (OUTPATIENT)
Dept: INTERNAL MEDICINE CLINIC | Age: 72
End: 2022-03-31
Payer: MEDICARE

## 2022-03-31 VITALS
WEIGHT: 118.8 LBS | TEMPERATURE: 97.9 F | OXYGEN SATURATION: 96 % | SYSTOLIC BLOOD PRESSURE: 138 MMHG | DIASTOLIC BLOOD PRESSURE: 80 MMHG | RESPIRATION RATE: 16 BRPM | HEIGHT: 63 IN | HEART RATE: 67 BPM | BODY MASS INDEX: 21.05 KG/M2

## 2022-03-31 DIAGNOSIS — I48.0 PAF (PAROXYSMAL ATRIAL FIBRILLATION) (HCC): ICD-10-CM

## 2022-03-31 DIAGNOSIS — Z12.31 BREAST CANCER SCREENING BY MAMMOGRAM: ICD-10-CM

## 2022-03-31 DIAGNOSIS — I10 HTN, GOAL BELOW 130/80: ICD-10-CM

## 2022-03-31 DIAGNOSIS — Z23 ENCOUNTER FOR IMMUNIZATION: ICD-10-CM

## 2022-03-31 DIAGNOSIS — K76.0 NAFLD (NONALCOHOLIC FATTY LIVER DISEASE): ICD-10-CM

## 2022-03-31 DIAGNOSIS — Z00.00 MEDICARE ANNUAL WELLNESS VISIT, SUBSEQUENT: Primary | ICD-10-CM

## 2022-03-31 DIAGNOSIS — I34.0 NONRHEUMATIC MITRAL VALVE REGURGITATION: ICD-10-CM

## 2022-03-31 PROCEDURE — G8754 DIAS BP LESS 90: HCPCS | Performed by: INTERNAL MEDICINE

## 2022-03-31 PROCEDURE — 1090F PRES/ABSN URINE INCON ASSESS: CPT | Performed by: INTERNAL MEDICINE

## 2022-03-31 PROCEDURE — 90715 TDAP VACCINE 7 YRS/> IM: CPT | Performed by: INTERNAL MEDICINE

## 2022-03-31 PROCEDURE — G8420 CALC BMI NORM PARAMETERS: HCPCS | Performed by: INTERNAL MEDICINE

## 2022-03-31 PROCEDURE — 99214 OFFICE O/P EST MOD 30 MIN: CPT | Performed by: INTERNAL MEDICINE

## 2022-03-31 PROCEDURE — G8510 SCR DEP NEG, NO PLAN REQD: HCPCS | Performed by: INTERNAL MEDICINE

## 2022-03-31 PROCEDURE — G8399 PT W/DXA RESULTS DOCUMENT: HCPCS | Performed by: INTERNAL MEDICINE

## 2022-03-31 PROCEDURE — G9899 SCRN MAM PERF RSLTS DOC: HCPCS | Performed by: INTERNAL MEDICINE

## 2022-03-31 PROCEDURE — 3017F COLORECTAL CA SCREEN DOC REV: CPT | Performed by: INTERNAL MEDICINE

## 2022-03-31 PROCEDURE — G8536 NO DOC ELDER MAL SCRN: HCPCS | Performed by: INTERNAL MEDICINE

## 2022-03-31 PROCEDURE — G0439 PPPS, SUBSEQ VISIT: HCPCS | Performed by: INTERNAL MEDICINE

## 2022-03-31 PROCEDURE — G8752 SYS BP LESS 140: HCPCS | Performed by: INTERNAL MEDICINE

## 2022-03-31 PROCEDURE — G8427 DOCREV CUR MEDS BY ELIG CLIN: HCPCS | Performed by: INTERNAL MEDICINE

## 2022-03-31 PROCEDURE — 1101F PT FALLS ASSESS-DOCD LE1/YR: CPT | Performed by: INTERNAL MEDICINE

## 2022-03-31 PROCEDURE — G0463 HOSPITAL OUTPT CLINIC VISIT: HCPCS | Performed by: INTERNAL MEDICINE

## 2022-03-31 NOTE — PATIENT INSTRUCTIONS

## 2022-03-31 NOTE — PROGRESS NOTES
HISTORY OF PRESENT ILLNESS  Aidan Mac is a 71 y.o. female. HPI  Seen for Medicare wellness visit, but does have some issues. She developed paroxysmal a-fib in the fall with heart rate in the 140s on an Apple watch, was seen by Dr. Kashif Resendiz. Subsequent monitor did not show recurrent episodes. Echo in November showed moderate mitral regurge. She is now on Eliquis and Atenolol and has not had any palpitations or tachycardia. Plan is that if she has recurrence then she will likely have a NATHALY. She will see Dr. Kashif Resendiz in June. Currently feels well. Cataract recently diagnosed with some decreased vision in left eye, anticipates surgical repair this spring. Hypertension. Blood pressure at home ranges 101-142/59-74, averaging in the 940 range systolic. On Olmesartan/HCTZ and Atenolol. Dyslipidemia. Tolerates Lovastatin 20. Does have a history of NAFLD. Fairly careful with her diet. Preventive care. Due for Tdap. Due for mammogram.  Bone density in January, 2020 showed mild bone loss, will repeat in a year. Discussed fourth COVID shot. Review of Systems   Constitutional: Negative for chills, diaphoresis, fever, malaise/fatigue and weight loss. HENT: Negative for hearing loss. Eyes: Positive for blurred vision. Negative for double vision and pain. Respiratory: Negative for cough, shortness of breath and wheezing. Cardiovascular: Negative for chest pain, palpitations, orthopnea, leg swelling and PND. Gastrointestinal: Negative for abdominal pain, diarrhea, heartburn, nausea and vomiting. Genitourinary: Negative for dysuria. Musculoskeletal: Negative for falls, joint pain and myalgias. Skin: Negative for rash. Neurological: Negative for dizziness, sensory change, focal weakness and headaches. Psychiatric/Behavioral: Negative for depression and memory loss. The patient does not have insomnia. Physical Exam  Vitals and nursing note reviewed.    Constitutional: Appearance: She is well-developed. HENT:      Head: Normocephalic and atraumatic. Right Ear: Tympanic membrane normal.      Left Ear: Tympanic membrane normal.      Mouth/Throat:      Mouth: Mucous membranes are dry. Neck:      Thyroid: No thyromegaly. Vascular: No carotid bruit. Cardiovascular:      Rate and Rhythm: Normal rate and regular rhythm. Heart sounds: S1 normal and S2 normal. Murmur heard. Pulmonary:      Effort: Pulmonary effort is normal. No respiratory distress. Breath sounds: Normal breath sounds. No wheezing or rales. Abdominal:      General: There is no distension. Palpations: Abdomen is soft. There is no mass. Tenderness: There is no abdominal tenderness. Hernia: No hernia is present. Genitourinary:     Comments: Breast exam bilaterally without masses axillary nodes or discharge. BSE reviewed     Musculoskeletal:      Cervical back: Normal range of motion and neck supple. Right lower leg: No edema. Left lower leg: No edema. Lymphadenopathy:      Cervical: No cervical adenopathy. Skin:     Findings: No rash. Neurological:      General: No focal deficit present. Mental Status: She is alert and oriented to person, place, and time. Psychiatric:         Mood and Affect: Mood normal.         Behavior: Behavior normal.         ASSESSMENT and PLAN  Diagnoses and all orders for this visit:    1. Medicare annual wellness visit, subsequent    2. PAF (paroxysmal atrial fibrillation) (HCC)  -     CBC WITH AUTOMATED DIFF; Future  -     TSH 3RD GENERATION; Future    3. Nonrheumatic mitral valve regurgitation-plans for repeat echo in june    4. Breast cancer screening by mammogram  -     [facility] 3D GENARO W MAMMO BI SCREENING INCL CAD; Future    5. HTN, goal below 130/80-home readings 101-143/ 60-74 will not change meds at this time    6. NAFLD (nonalcoholic fatty liver disease)  -     METABOLIC PANEL, COMPREHENSIVE;  Future  -     LIPID PANEL; Future  -     HEMOGLOBIN A1C WITH EAG; Future    7. Encounter for immunization  -     TETANUS, DIPHTHERIA TOXOIDS AND ACELLULAR PERTUSSIS VACCINE (TDAP), IN INDIVIDS. >=7, IM    This is the Subsequent Medicare Annual Wellness Exam, performed 12 months or more after the Initial AWV or the last Subsequent AWV    I have reviewed the patient's medical history in detail and updated the computerized patient record. Assessment/Plan   Education and counseling provided:  Are appropriate based on today's review and evaluation  Pneumococcal Vaccine  Influenza Vaccine  Screening Mammography  Bone mass measurement (DEXA)  Screening for glaucoma    1. Medicare annual wellness visit, subsequent  2. PAF (paroxysmal atrial fibrillation) (HCC)  -     CBC WITH AUTOMATED DIFF; Future  -     TSH 3RD GENERATION; Future  3. Nonrheumatic mitral valve regurgitation  4. Breast cancer screening by mammogram  -     [facility] 3D GENARO W MAMMO BI SCREENING INCL CAD; Future  5. HTN, goal below 130/80  6. NAFLD (nonalcoholic fatty liver disease)  -     METABOLIC PANEL, COMPREHENSIVE; Future  -     LIPID PANEL; Future  -     HEMOGLOBIN A1C WITH EAG; Future  7. Encounter for immunization  -     TETANUS, DIPHTHERIA TOXOIDS AND ACELLULAR PERTUSSIS VACCINE (TDAP), IN INDIVIDS. >=7, IM       Depression Risk Factor Screening     3 most recent PHQ Screens 3/31/2022   Little interest or pleasure in doing things Not at all   Feeling down, depressed, irritable, or hopeless Not at all   Total Score PHQ 2 0       Alcohol & Drug Abuse Risk Screen   Do you average more than 1 drink per night or more than 7 drinks a week?: (P) No  On any one occasion in the past three months have you had more than 3 drinks containing alcohol?: (P) No            Functional Ability and Level of Safety   Hearing:  Hearing: (P) additional comments below  Hearing comments: (P) I started to have some difficulties      Activities of Daily Living:   The home contains: (P) no safety equipment,rugs  Functional ADLs: (P) Patient does total self care     Ambulation:  Patient ambulates: (P) with no difficulty     Fall Risk:  Fall Risk Assessment, last 12 mths 3/31/2022   Able to walk? Yes   Fall in past 12 months? 0   Do you feel unsteady?  0   Are you worried about falling 0     Abuse Screen:  Do you ever feel afraid of your partner?: (P) No  Are you in a relationship with someone who physically or mentally threatens you?: (P) No  Is it safe for you to go home?: (P) Yes        Cognitive Screening   Has your family/caregiver stated any concerns about your memory?: (P) No     Cognitive Screening: Normal - Verbal Fluency Test    Health Maintenance Due     Health Maintenance Due   Topic Date Due    Colorectal Cancer Screening Combo  2021    Medicare Yearly Exam  2022       Patient Care Team   Patient Care Team:  Gasper Deal MD as PCP - General (Internal Medicine)  Gasper Deal MD as PCP - [facility]  Errol White MD (Cardiology)    History     Patient Active Problem List   Diagnosis Code    FHx: AAA [id number]    HTN, goal below 130/80 I10    Dyslipidemia E78.5    Impaired glucose tolerance R73.02    NAFLD (nonalcoholic fatty liver disease) K76.0    Osteopenia M85.80    Pyuria R82.81    Mitral regurgitation I34.0    PAF (paroxysmal atrial fibrillation) ([facility] I48.0     Past Medical History:   Diagnosis Date    Dyslipidemia     HTN, goal below 130/80 2015    Hx of bone density study 2016    Osteopenic    Hx of mammogram 07/10/2017    Negtaive     Impaired glucose tolerance 2015    Mitral regurgitation     mild MR    Osteopenia 2015    PAF (paroxysmal atrial fibrillation) ([facility]     Routine Papanicolaou smear 2018    Neg pap       Past Surgical History:   Procedure Laterality Date    HX APPENDECTOMY      HX  SECTION      HX COLONOSCOPY      HX ORTHOPAEDIC  13    right foot     Current Outpatient Medications   Medication Sig Dispense Refill    atenoloL (TENORMIN) 50 mg tablet TAKE 1 TABLET BY MOUTH TWO TIMES DAILY 180 Tablet 1    lovastatin (MEVACOR) 20 mg tablet TAKE 1 TABLET BY MOUTH NIGHTLY 90 Tablet 1    apixaban (ELIQUIS) 5 mg tablet Take 1 Tablet by mouth two (2) times a day. 180 Tablet 3    olmesartan-hydroCHLOROthiazide (BENICAR HCT) 20-12.5 mg per tablet TAKE 1 TABLET BY MOUTH EVERY DAY 90 Tablet 3    amLODIPine (NORVASC) 2.5 mg tablet Take 1 Tab by mouth daily. 90 Tab 3    multivitamin (ONE A DAY) tablet Take 1 Tab by mouth daily. Ultimate calcium and coq10      TURMERIC ROOT EXTRACT PO Take  by mouth.  ferrous fumarate/vit Bcomp,C (SUPER B COMPLEX PO) Take  by mouth daily.  omega-3 fatty acids-vitamin e 1,000 mg cap Take 2 Caps by mouth daily.  Cholecalciferol, Vitamin D3, (VITAMIN D3) 2,000 unit cap capsule Take 2,000 Units by mouth daily. 30 Cap 5     Allergies   Allergen Reactions    Shellfish Derived Diarrhea and Nausea and Vomiting       Family History   Problem Relation Age of Onset    Hypertension Father     Hypertension Sister      Social History     Tobacco Use    Smoking status: Former Smoker     Packs/day: 0.75     Years: 15.00     Pack years: 11.25     Quit date: 2012     Years since quittin.5    Smokeless tobacco: Never Used   Substance Use Topics    Alcohol use:  Yes     Alcohol/week: 0.0 standard drinks         Filbert Boast, MD

## 2022-04-01 LAB
ALBUMIN SERPL-MCNC: 4.3 G/DL (ref 3.5–5)
ALBUMIN/GLOB SERPL: 1.4 {RATIO} (ref 1.1–2.2)
ALP SERPL-CCNC: 58 U/L (ref 45–117)
ALT SERPL-CCNC: 48 U/L (ref 12–78)
ANION GAP SERPL CALC-SCNC: 5 MMOL/L (ref 5–15)
AST SERPL-CCNC: 43 U/L (ref 15–37)
BASOPHILS # BLD: 0.1 K/UL (ref 0–0.1)
BASOPHILS NFR BLD: 1 % (ref 0–1)
BILIRUB SERPL-MCNC: 0.6 MG/DL (ref 0.2–1)
BUN SERPL-MCNC: 12 MG/DL (ref 6–20)
BUN/CREAT SERPL: 17 (ref 12–20)
CALCIUM SERPL-MCNC: 9.6 MG/DL (ref 8.5–10.1)
CHLORIDE SERPL-SCNC: 103 MMOL/L (ref 97–108)
CHOLEST SERPL-MCNC: 181 MG/DL
CO2 SERPL-SCNC: 29 MMOL/L (ref 21–32)
CREAT SERPL-MCNC: 0.71 MG/DL (ref 0.55–1.02)
DIFFERENTIAL METHOD BLD: ABNORMAL
EOSINOPHIL # BLD: 0.1 K/UL (ref 0–0.4)
EOSINOPHIL NFR BLD: 2 % (ref 0–7)
ERYTHROCYTE [DISTWIDTH] IN BLOOD BY AUTOMATED COUNT: 14 % (ref 11.5–14.5)
EST. AVERAGE GLUCOSE BLD GHB EST-MCNC: 103 MG/DL
GLOBULIN SER CALC-MCNC: 3.1 G/DL (ref 2–4)
GLUCOSE SERPL-MCNC: 101 MG/DL (ref 65–100)
HBA1C MFR BLD: 5.2 % (ref 4–5.6)
HCT VFR BLD AUTO: 39.5 % (ref 35–47)
HDLC SERPL-MCNC: 83 MG/DL
HDLC SERPL: 2.2 {RATIO} (ref 0–5)
HGB BLD-MCNC: 12.7 G/DL (ref 11.5–16)
IMM GRANULOCYTES # BLD AUTO: 0 K/UL (ref 0–0.04)
IMM GRANULOCYTES NFR BLD AUTO: 0 % (ref 0–0.5)
LDLC SERPL CALC-MCNC: 73.2 MG/DL (ref 0–100)
LYMPHOCYTES # BLD: 2.3 K/UL (ref 0.8–3.5)
LYMPHOCYTES NFR BLD: 34 % (ref 12–49)
MCH RBC QN AUTO: 32.2 PG (ref 26–34)
MCHC RBC AUTO-ENTMCNC: 32.2 G/DL (ref 30–36.5)
MCV RBC AUTO: 100 FL (ref 80–99)
MONOCYTES # BLD: 0.7 K/UL (ref 0–1)
MONOCYTES NFR BLD: 10 % (ref 5–13)
NEUTS SEG # BLD: 3.6 K/UL (ref 1.8–8)
NEUTS SEG NFR BLD: 53 % (ref 32–75)
NRBC # BLD: 0 K/UL (ref 0–0.01)
NRBC BLD-RTO: 0 PER 100 WBC
PLATELET # BLD AUTO: 201 K/UL (ref 150–400)
PMV BLD AUTO: 10.8 FL (ref 8.9–12.9)
POTASSIUM SERPL-SCNC: 3.6 MMOL/L (ref 3.5–5.1)
PROT SERPL-MCNC: 7.4 G/DL (ref 6.4–8.2)
RBC # BLD AUTO: 3.95 M/UL (ref 3.8–5.2)
SODIUM SERPL-SCNC: 137 MMOL/L (ref 136–145)
TRIGL SERPL-MCNC: 124 MG/DL (ref ?–150)
TSH SERPL DL<=0.05 MIU/L-ACNC: 1.23 UIU/ML (ref 0.36–3.74)
VLDLC SERPL CALC-MCNC: 24.8 MG/DL
WBC # BLD AUTO: 6.8 K/UL (ref 3.6–11)

## 2022-05-09 RX ORDER — AMLODIPINE BESYLATE 2.5 MG/1
2.5 TABLET ORAL DAILY
Qty: 90 TABLET | Refills: 3 | Status: SHIPPED | OUTPATIENT
Start: 2022-05-09

## 2022-05-09 NOTE — TELEPHONE ENCOUNTER
Refill per VO of Dr. Dandy Chavez  Last appt: 1/13/2022  Future Appointments   Date Time Provider Alfred Darling   5/31/2022  9:00 AM [facility]   6/20/2022  9:00 AM JONATHAN DÍAZ   6/20/2022  9:40 AM Anna , NP [facility] BS AMB       Requested Prescriptions     Signed Prescriptions Disp Refills    amLODIPine (NORVASC) 2.5 mg tablet 90 Tablet 3     Sig: Take 1 Tablet by mouth daily.      Authorizing Provider: Jaquelin Pyle     Ordering User: Bennett Rodriguez

## 2022-05-31 ENCOUNTER — HOSPITAL ENCOUNTER (OUTPATIENT)
Dept: MAMMOGRAPHY | Age: 72
Discharge: HOME | End: 2022-05-31
Attending: INTERNAL MEDICINE
Payer: MEDICARE

## 2022-05-31 DIAGNOSIS — Z12.31 BREAST CANCER SCREENING BY MAMMOGRAM: ICD-10-CM

## 2022-05-31 DIAGNOSIS — I10 HTN, GOAL BELOW 130/80: ICD-10-CM

## 2022-05-31 PROCEDURE — 77063 BREAST TOMOSYNTHESIS BI: CPT

## 2022-05-31 RX ORDER — OLMESARTAN MEDOXOMIL AND HYDROCHLOROTHIAZIDE 20/12.5 20; 12.5 MG/1; MG/1
TABLET ORAL
Qty: 90 TABLET | Refills: 3 | Status: SHIPPED | OUTPATIENT
Start: 2022-05-31

## 2022-05-31 NOTE — TELEPHONE ENCOUNTER
Refill per VO of Dr. Bradly Perez  Last appt: 1/13/22  Future Appointments   Date Time Provider Alfred Darling   6/20/2022  9:00 AM JONATHAN DÍAZ   6/20/2022  9:40 AM Lettie Alpers, NP [facility] AMB       Requested Prescriptions     Signed Prescriptions Disp Refills    olmesartan-hydroCHLOROthiazide (BENICAR HCT) 20-12.5 mg per tablet 90 Tablet 3     Sig: TAKE 1 TABLET BY MOUTH EVERY DAY     Authorizing Provider: Leonard Joyce     Ordering User: Lashay Tejada

## 2022-06-20 ENCOUNTER — OFFICE VISIT (OUTPATIENT)
Dept: CARDIOLOGY CLINIC | Age: 72
End: 2022-06-20
Payer: MEDICARE

## 2022-06-20 ENCOUNTER — ANCILLARY PROCEDURE (OUTPATIENT)
Dept: CARDIOLOGY CLINIC | Age: 72
End: 2022-06-20
Payer: MEDICARE

## 2022-06-20 VITALS
HEART RATE: 81 BPM | WEIGHT: 120 LBS | BODY MASS INDEX: 21.26 KG/M2 | HEIGHT: 63 IN | SYSTOLIC BLOOD PRESSURE: 130 MMHG | OXYGEN SATURATION: 98 % | DIASTOLIC BLOOD PRESSURE: 80 MMHG

## 2022-06-20 VITALS
HEIGHT: 63 IN | WEIGHT: 120 LBS | BODY MASS INDEX: 21.26 KG/M2 | DIASTOLIC BLOOD PRESSURE: 80 MMHG | SYSTOLIC BLOOD PRESSURE: 150 MMHG

## 2022-06-20 DIAGNOSIS — E78.5 DYSLIPIDEMIA: ICD-10-CM

## 2022-06-20 DIAGNOSIS — I34.1 MITRAL VALVE PROLAPSE: ICD-10-CM

## 2022-06-20 DIAGNOSIS — I48.0 PAROXYSMAL ATRIAL FIBRILLATION (HCC): Primary | ICD-10-CM

## 2022-06-20 DIAGNOSIS — I34.0 NONRHEUMATIC MITRAL VALVE REGURGITATION: ICD-10-CM

## 2022-06-20 DIAGNOSIS — I48.0 PAROXYSMAL ATRIAL FIBRILLATION (HCC): ICD-10-CM

## 2022-06-20 DIAGNOSIS — Z79.01 ANTICOAGULATED: ICD-10-CM

## 2022-06-20 DIAGNOSIS — I10 HTN, GOAL BELOW 130/80: ICD-10-CM

## 2022-06-20 PROCEDURE — G8420 CALC BMI NORM PARAMETERS: HCPCS | Performed by: NURSE PRACTITIONER

## 2022-06-20 PROCEDURE — G8536 NO DOC ELDER MAL SCRN: HCPCS | Performed by: NURSE PRACTITIONER

## 2022-06-20 PROCEDURE — G8432 DEP SCR NOT DOC, RNG: HCPCS | Performed by: NURSE PRACTITIONER

## 2022-06-20 PROCEDURE — 99214 OFFICE O/P EST MOD 30 MIN: CPT | Performed by: NURSE PRACTITIONER

## 2022-06-20 PROCEDURE — G8754 DIAS BP LESS 90: HCPCS | Performed by: NURSE PRACTITIONER

## 2022-06-20 PROCEDURE — 1101F PT FALLS ASSESS-DOCD LE1/YR: CPT | Performed by: NURSE PRACTITIONER

## 2022-06-20 PROCEDURE — 1123F ACP DISCUSS/DSCN MKR DOCD: CPT | Performed by: NURSE PRACTITIONER

## 2022-06-20 PROCEDURE — G9899 SCRN MAM PERF RSLTS DOC: HCPCS | Performed by: NURSE PRACTITIONER

## 2022-06-20 PROCEDURE — 3017F COLORECTAL CA SCREEN DOC REV: CPT | Performed by: NURSE PRACTITIONER

## 2022-06-20 PROCEDURE — 1090F PRES/ABSN URINE INCON ASSESS: CPT | Performed by: NURSE PRACTITIONER

## 2022-06-20 PROCEDURE — G8399 PT W/DXA RESULTS DOCUMENT: HCPCS | Performed by: NURSE PRACTITIONER

## 2022-06-20 PROCEDURE — G8427 DOCREV CUR MEDS BY ELIG CLIN: HCPCS | Performed by: NURSE PRACTITIONER

## 2022-06-20 PROCEDURE — 93306 TTE W/DOPPLER COMPLETE: CPT | Performed by: SPECIALIST

## 2022-06-20 PROCEDURE — G8752 SYS BP LESS 140: HCPCS | Performed by: NURSE PRACTITIONER

## 2022-06-20 PROCEDURE — G0463 HOSPITAL OUTPT CLINIC VISIT: HCPCS | Performed by: NURSE PRACTITIONER

## 2022-06-20 NOTE — PROGRESS NOTES
TIANA Carrillo  [address], [address] [place]  [place], [address] [facility]    Office [phone]  Fax [phone]          Patient: Rosanna Ackerman  : 1950      Today's Date: 2022          HISTORY OF PRESENT ILLNESS:     History of Present Illness:  Here for f/u of afib as well as MR. Echo today (previsit). Pt has been feeling well and without c/o. No recent palpitations. Patient denies any exertional chest pain, dyspnea, palpitations, syncope, edema, or paroxysmal nocturnal dyspnea. No dizziness or bleeding issues on eliquis. Hm BP 100s-130s/60s (mostly 120s/60s). Higher in AM prior to meds 140s. PAST MEDICAL HISTORY:     Past Medical History:   Diagnosis Date    Dyslipidemia     HTN, goal below 130/80 2015    Hx of bone density study 2016    Osteopenic    Hx of mammogram 07/10/2017    Negtaive     Impaired glucose tolerance 2015    Mitral regurgitation     mild MR    Osteopenia 2015    PAF [facility]oxysmal [facility] fibrillation) ([facility])     Routine Papanicolaou smear 2018    Neg pap        Past Surgical History:   Procedure Laterality Date    HX APPENDECTOMY  1963    HX  SECTION      HX COLONOSCOPY      HX ORTHOPAEDIC  13    right foot         MEDICATIONS:     Current Outpatient Medications   Medication Sig Dispense Refill    olmesartan-hydroCHLOROthiazide (BENICAR HCT) 20-12.5 mg per tablet TAKE 1 TABLET BY MOUTH EVERY DAY 90 Tablet 3    amLODIPine (NORVASC) 2.5 mg tablet Take 1 Tablet by mouth daily. 90 Tablet 3    atenoloL (TENORMIN) 50 mg tablet TAKE 1 TABLET BY MOUTH TWO TIMES DAILY 180 Tablet 1    lovastatin (MEVACOR) 20 mg tablet TAKE 1 TABLET BY MOUTH NIGHTLY 90 Tablet 1    apixaban (ELIQUIS) 5 mg tablet Take 1 Tablet by mouth two (2) times a day. 180 Tablet 3    multivitamin (ONE A DAY) tablet Take 1 Tab by mouth daily. Ultimate calcium and coq10      TURMERIC ROOT EXTRACT PO Take  by mouth.  ferrous fumarate/vit Bcomp,C (SUPER B COMPLEX PO) Take  by mouth daily.  omega-3 fatty acids-vitamin e 1,000 mg cap Take 2 Caps by mouth daily.  Cholecalciferol, Vitamin D3, (VITAMIN D3) 2,000 unit cap capsule Take 2,000 Units by mouth daily. 30 Cap 5       Allergies   Allergen Reactions    Shellfish Derived Diarrhea and Nausea and Vomiting           SOCIAL HISTORY:     Social History     Tobacco Use    Smoking status: Former Smoker     Packs/day: 0.75     Years: 15.00     Pack years: 11.25     Quit date: 2012     Years since quittin.8    Smokeless tobacco: Never Used   Vaping Use    Vaping Use: Never used   Substance Use Topics    Alcohol use: Yes     Alcohol/week: 0.0 standard drinks    Drug use: No         FAMILY HISTORY:     Family History   Problem Relation Age of Onset    Hypertension Father     Hypertension Sister            REVIEW OF SYMPTOMS:     Review of Symptoms:  Constitutional: Negative for fever, chills  HEENT: Negative for nosebleeds, tinnitus, and vision changes. Respiratory: Negative for cough, wheezing  Cardiovascular: Negative for orthopnea, claudication, syncope, and PND. Gastrointestinal: Negative for abdominal pain, diarrhea, melena. Genitourinary: Negative for dysuria  Musculoskeletal: Negative for myalgias. Skin: Negative for rash  Heme: No problems bleeding. Neurological: Negative for speech change and focal weakness. PHYSICAL EXAM:     Physical Exam:  Visit Vitals  /84 (BP 1 Location: Right upper arm, BP Patient Position: Sitting)   Pulse 81   Ht 5' 3\" (1.6 m)   Wt 120 lb (54.4 kg)   SpO2 98%   BMI 21.26 kg/m²     Patient appears generally well, mood and affect are appropriate and pleasant. HEENT:  Hearing intact, non-icteric, normocephalic, atraumatic. Neck Exam: Supple, No JVD  Lung Exam: Clear to auscultation, even breath sounds.    Cardiac Exam: Regular rate and rhythm with 2/6 systolic murmur at apex   Abdomen: Soft, non-tender Extremities: Moves all ext well. No lower extremity edema. MSKTL: Overall good ROM ext  Skin: No significant rashes  Psych: Appropriate affect  Neuro - Grossly intact        LABS / OTHER STUDIES reviewed:     Lab Results   Component Value Date/Time    Sodium 137 03/31/2022 04:05 PM    Potassium 3.6 03/31/2022 04:05 PM    Chloride 103 03/31/2022 04:05 PM    CO2 29 03/31/2022 04:05 PM    Anion gap 5 03/31/2022 04:05 PM    Glucose 101 (H) 03/31/2022 04:05 PM    BUN 12 03/31/2022 04:05 PM    Creatinine 0.71 03/31/2022 04:05 PM    BUN/Creatinine ratio 17 03/31/2022 04:05 PM    GFR est AA >60 03/31/2022 04:05 PM    GFR est non-AA >60 03/31/2022 04:05 PM    Calcium 9.6 03/31/2022 04:05 PM    Bilirubin, total 0.6 03/31/2022 04:05 PM    Alk. phosphatase 58 03/31/2022 04:05 PM    Protein, total 7.4 03/31/2022 04:05 PM    Albumin 4.3 03/31/2022 04:05 PM    Globulin 3.1 03/31/2022 04:05 PM    A-G Ratio 1.4 03/31/2022 04:05 PM    ALT (SGPT) 48 03/31/2022 04:05 PM    AST (SGOT) 43 (H) 03/31/2022 04:05 PM     Lab Results   Component Value Date/Time    WBC 6.8 03/31/2022 04:05 PM    HGB 12.7 03/31/2022 04:05 PM    HCT 39.5 03/31/2022 04:05 PM    PLATELET 340 81/00/8692 04:05 PM    .0 (H) 03/31/2022 04:05 PM       Lab Results   Component Value Date/Time    Cholesterol, total 181 03/31/2022 04:05 PM    HDL Cholesterol 83 03/31/2022 04:05 PM    LDL, calculated 73.2 03/31/2022 04:05 PM    VLDL, calculated 24.8 03/31/2022 04:05 PM    Triglyceride 124 03/31/2022 04:05 PM    CHOL/HDL Ratio 2.2 03/31/2022 04:05 PM       Lab Results   Component Value Date/Time    TSH 1.23 03/31/2022 04:05 PM             CARDIAC DIAGNOSTICS:     Cardiac Evaluation Includes:  I reviewed the results below.      EKG 3/1/19 - NSR, PVC  EKG - Apple Watch 11/20/21 - Afib,   EKG 11/24/21 - sinus elba, normal        Echo 2/3/17 - LVEF 60%, mild LAE, Mild MR, mild AI    Stress Echo 2/20/17 - walked 5:40 (10 METS), normal stress EKG and stress Echo    Echo 1/23/19 - I viewed echo myself - On my review - Normal LV size.  LVEF 55%, mild post leaflet prolapse and mild-mod MR, mild AI, Asc 3.8 cm      Echo 3/12/20 -  LVEF 58%, Mitral valve thickening. Myxomatous mitral valve disease. Mild mitral valve prolapse. Late systolic mitral valve prolapse. Moderate mitral valve regurgitation is present.  Dilated sinuses of Valsalva; diameter is 3.5 cm. Dilated ascending aorta; diameter is 3.7 cm.     Echo 3/17/21 - LVEF 55-60%. Mild MVP and mild MR. Ao 3.58     Echo 11/24/21 - LVEF 60%, MVP with moderate MR     Exercise Cardiolite 12/10/21 - Walked 5:54 min (7 METS). Normal stress EKG. Normal MPI. LVEF 70%    30 day Event Monitor 12/4/21 - NSR, HR , Avg 64 bpm.  Single 6 beat run of NSVT. No Afib.      ASSESSMENT AND PLAN:      Assessment and Plan:  1) PAF - symptomatic   - 30 day Event Monitor 12/4/21 - NSR, HR , Avg 64 bpm.  Single 6 beat run of NSVT. No Afib. - no symptomatic c/o this year - has been feeling well   - cont on atenolol for rate control and Eliquis 5 mg BID for stroke ppx     2) Mild MVP and mild-mod MR  - Per Dr. Memo Dasilva read: Very mild prolapse of posterior leaflet with at most mild-mod MR.  LVEF is normal.   - Echo 11/24/21 with mod MR ----> if she is having frequent episodes of PAF, then would plan a NATHALY to see if she has severe MR and needs MV surgery   - repeat echo today (results pending)     3) Ascending aorta minimally dilated to upper normal size (3.8 cm) on echo 1/19  - Aim for good BP control   - follow on echoes      4) HTN  - Her BP is usually good at home - BP goal < 130/80  - continue meds and follow BP at home      5) fu in 6mo or PRN; phone fu with echo results    Patient expressed understanding of the plan - questions were answered.      She is originally from [facility] [place] [facility].  She has 3 boys (one is in [place] and another in [facility]).           Izola Kayser, ANP

## 2022-06-20 NOTE — PROGRESS NOTES
No chief complaint on file. There were no vitals taken for this visit.   Chest pain denied   SOB denied   Palpitations denied   Swelling in hands/feet denied   Dizziness denied   Recent hospital stays denied   Refills denied   Vitals:    06/20/22 0908 06/20/22 0946 06/20/22 0957 06/20/22 0958   BP: (!) 150/80 130/84 (!) 148/80 130/80   BP 1 Location: Left upper arm Right upper arm Left upper arm Right upper arm   BP Patient Position: Sitting Sitting Sitting Sitting   Pulse:  81     Height: 5' 3\" (1.6 m)      Weight: 120 lb (54.4 kg)      SpO2:  98%         Vitals:    06/20/22 0908 06/20/22 0946   BP: (!) 150/80 130/84   BP 1 Location: Left upper arm Right upper arm   BP Patient Position: Sitting Sitting   Pulse:  81   Height: 5' 3\" (1.6 m)    Weight: 120 lb (54.4 kg)    SpO2:  98%

## 2022-06-20 NOTE — LETTER
6/20/2022    Patient: Meredith Dhaliwal   YOB: 1950   Date of Visit: 6/20/2022     Alicia Ho MD  [address]  [address]  [facility] [address]  [address] [facility], [address]  [address] [facility] [address]  [address] [facility]  [address] [facility] [address]    Dear MD Irma Freed MD,      Thank you for referring Ms. Meredith Dhaliwal to [address] for evaluation. My notes for this consultation are attached. If you have questions, please do not hesitate to call me. I look forward to following your patient along with you.       Sincerely,    Malik Shell NP

## 2022-06-21 LAB
ECHO AO ASC DIAM: 3.8 CM
ECHO AO ASCENDING AORTA INDEX: 2.44 CM/M2
ECHO AO ROOT DIAM: 3.4 CM
ECHO AO ROOT INDEX: 2.18 CM/M2
ECHO AR MAX VEL PISA: 3.5 M/S
ECHO AV MEAN GRADIENT: 3 MMHG
ECHO AV MEAN VELOCITY: 0.8 M/S
ECHO AV PEAK GRADIENT: 5 MMHG
ECHO AV PEAK VELOCITY: 1.1 M/S
ECHO AV REGURGITANT PHT: 669.7 MILLISECOND
ECHO AV VTI: 22.6 CM
ECHO EST RA PRESSURE: 3 MMHG
ECHO LA DIAMETER INDEX: 2.82 CM/M2
ECHO LA DIAMETER: 4.4 CM
ECHO LA TO AORTIC ROOT RATIO: 1.29
ECHO LA VOL 2C: 52 ML (ref 22–52)
ECHO LA VOL 4C: 63 ML (ref 22–52)
ECHO LA VOL BP: 59 ML (ref 22–52)
ECHO LA VOL/BSA BIPLANE: 38 ML/M2 (ref 16–34)
ECHO LA VOLUME AREA LENGTH: 63 ML
ECHO LA VOLUME INDEX A2C: 33 ML/M2 (ref 16–34)
ECHO LA VOLUME INDEX A4C: 40 ML/M2 (ref 16–34)
ECHO LA VOLUME INDEX AREA LENGTH: 40 ML/M2 (ref 16–34)
ECHO LV E' LATERAL VELOCITY: 9 CM/S
ECHO LV E' SEPTAL VELOCITY: 7 CM/S
ECHO LV EDV A2C: 86 ML
ECHO LV EDV A4C: 98 ML
ECHO LV EDV BP: 92 ML (ref 56–104)
ECHO LV EDV INDEX A4C: 63 ML/M2
ECHO LV EDV INDEX BP: 59 ML/M2
ECHO LV EDV NDEX A2C: 55 ML/M2
ECHO LV EJECTION FRACTION A2C: 56 %
ECHO LV EJECTION FRACTION A4C: 60 %
ECHO LV EJECTION FRACTION BIPLANE: 58 % (ref 55–100)
ECHO LV ESV A2C: 38 ML
ECHO LV ESV A4C: 39 ML
ECHO LV ESV BP: 39 ML (ref 19–49)
ECHO LV ESV INDEX A2C: 24 ML/M2
ECHO LV ESV INDEX A4C: 25 ML/M2
ECHO LV ESV INDEX BP: 25 ML/M2
ECHO LV FRACTIONAL SHORTENING: 30 % (ref 28–44)
ECHO LV INTERNAL DIMENSION DIASTOLE INDEX: 2.76 CM/M2
ECHO LV INTERNAL DIMENSION DIASTOLIC: 4.3 CM (ref 3.9–5.3)
ECHO LV INTERNAL DIMENSION SYSTOLIC INDEX: 1.92 CM/M2
ECHO LV INTERNAL DIMENSION SYSTOLIC: 3 CM
ECHO LV IVSD: 0.9 CM (ref 0.6–0.9)
ECHO LV MASS 2D: 114.2 G (ref 67–162)
ECHO LV MASS INDEX 2D: 73.2 G/M2 (ref 43–95)
ECHO LV POSTERIOR WALL DIASTOLIC: 0.8 CM (ref 0.6–0.9)
ECHO LV RELATIVE WALL THICKNESS RATIO: 0.37
ECHO MV A VELOCITY: 0.88 M/S
ECHO MV AREA PHT: 3.4 CM2
ECHO MV E DECELERATION TIME (DT): 224.4 MS
ECHO MV E VELOCITY: 1.02 M/S
ECHO MV E/A RATIO: 1.16
ECHO MV E/E' LATERAL: 11.33
ECHO MV E/E' RATIO (AVERAGED): 12.95
ECHO MV E/E' SEPTAL: 14.57
ECHO MV PRESSURE HALF TIME (PHT): 65.1 MS
ECHO MV REGURGITANT ALIASING (NYQUIST) VELOCITY: 37 CM/S
ECHO MV REGURGITANT VELOCITY PISA: 7 M/S
ECHO MV REGURGITANT VTIA: 229.6 CM
ECHO RIGHT VENTRICULAR SYSTOLIC PRESSURE (RVSP): 27 MMHG
ECHO RV FREE WALL PEAK S': 14 CM/S
ECHO RV INTERNAL DIMENSION: 3.1 CM
ECHO RV TAPSE: 1.6 CM (ref 1.7–?)
ECHO TV REGURGITANT MAX VELOCITY: 2.47 M/S
ECHO TV REGURGITANT PEAK GRADIENT: 24 MMHG

## 2022-06-21 PROCEDURE — 93306 TTE W/DOPPLER COMPLETE: CPT | Performed by: SPECIALIST

## 2022-06-22 NOTE — PROGRESS NOTES
Dear Ms. Pathak,  Your echo results are stable. Please let me know if you have any questions.    Dr. Amanda Hansen

## 2022-06-23 DIAGNOSIS — E78.5 DYSLIPIDEMIA: ICD-10-CM

## 2022-06-23 DIAGNOSIS — I10 HTN, GOAL BELOW 130/80: ICD-10-CM

## 2022-06-23 RX ORDER — ATENOLOL 50 MG/1
TABLET ORAL
Qty: 180 TABLET | Refills: 1 | Status: SHIPPED | OUTPATIENT
Start: 2022-06-23

## 2022-06-23 RX ORDER — LOVASTATIN 20 MG/1
TABLET ORAL
Qty: 90 TABLET | Refills: 1 | Status: SHIPPED | OUTPATIENT
Start: 2022-06-23

## 2022-11-17 ENCOUNTER — TELEPHONE (OUTPATIENT)
Dept: INTERNAL MEDICINE CLINIC | Age: 72
End: 2022-11-17

## 2022-11-17 NOTE — TELEPHONE ENCOUNTER
Patient called to state she is feeling very ill. Patient states she has felt this way since Monday. Patient did not provide PSR with any further details but did state she has never felt like this before. Patient stated she thinks she needs to see Dr. Gabrielle Holguin as soon as possible and to also have some labs run. Please call patient back and advise.

## 2022-11-17 NOTE — TELEPHONE ENCOUNTER
Returned call to patient. She reports fatigue & legs feeling heavy. Symptoms have been present since Monday. She denies chest pain, sob, respiratory or sinus issues. She did do an at-home covid test & that was neg. Pt scheduled to see PCP tomorrow at 10:45 for evaluation. Pt was thankful for the appt.

## 2022-11-18 ENCOUNTER — OFFICE VISIT (OUTPATIENT)
Dept: INTERNAL MEDICINE CLINIC | Age: 72
End: 2022-11-18
Payer: MEDICARE

## 2022-11-18 VITALS
HEART RATE: 69 BPM | OXYGEN SATURATION: 96 % | TEMPERATURE: 97.5 F | SYSTOLIC BLOOD PRESSURE: 135 MMHG | HEIGHT: 63 IN | WEIGHT: 122.2 LBS | RESPIRATION RATE: 16 BRPM | BODY MASS INDEX: 21.65 KG/M2 | DIASTOLIC BLOOD PRESSURE: 83 MMHG

## 2022-11-18 DIAGNOSIS — I10 HTN, GOAL BELOW 130/80: ICD-10-CM

## 2022-11-18 DIAGNOSIS — I48.0 PAF (PAROXYSMAL ATRIAL FIBRILLATION) (HCC): ICD-10-CM

## 2022-11-18 DIAGNOSIS — R53.83 OTHER FATIGUE: Primary | ICD-10-CM

## 2022-11-18 DIAGNOSIS — E78.5 DYSLIPIDEMIA: ICD-10-CM

## 2022-11-18 LAB
COMMENT, HOLDF: NORMAL
SAMPLES BEING HELD,HOLD: NORMAL

## 2022-11-18 PROCEDURE — G8752 SYS BP LESS 140: HCPCS | Performed by: INTERNAL MEDICINE

## 2022-11-18 PROCEDURE — 3017F COLORECTAL CA SCREEN DOC REV: CPT | Performed by: INTERNAL MEDICINE

## 2022-11-18 PROCEDURE — G8427 DOCREV CUR MEDS BY ELIG CLIN: HCPCS | Performed by: INTERNAL MEDICINE

## 2022-11-18 PROCEDURE — G8754 DIAS BP LESS 90: HCPCS | Performed by: INTERNAL MEDICINE

## 2022-11-18 PROCEDURE — G0463 HOSPITAL OUTPT CLINIC VISIT: HCPCS | Performed by: INTERNAL MEDICINE

## 2022-11-18 PROCEDURE — 1101F PT FALLS ASSESS-DOCD LE1/YR: CPT | Performed by: INTERNAL MEDICINE

## 2022-11-18 PROCEDURE — G8536 NO DOC ELDER MAL SCRN: HCPCS | Performed by: INTERNAL MEDICINE

## 2022-11-18 PROCEDURE — G8399 PT W/DXA RESULTS DOCUMENT: HCPCS | Performed by: INTERNAL MEDICINE

## 2022-11-18 PROCEDURE — 99214 OFFICE O/P EST MOD 30 MIN: CPT | Performed by: INTERNAL MEDICINE

## 2022-11-18 PROCEDURE — G8432 DEP SCR NOT DOC, RNG: HCPCS | Performed by: INTERNAL MEDICINE

## 2022-11-18 PROCEDURE — G9899 SCRN MAM PERF RSLTS DOC: HCPCS | Performed by: INTERNAL MEDICINE

## 2022-11-18 PROCEDURE — G8420 CALC BMI NORM PARAMETERS: HCPCS | Performed by: INTERNAL MEDICINE

## 2022-11-18 PROCEDURE — 1090F PRES/ABSN URINE INCON ASSESS: CPT | Performed by: INTERNAL MEDICINE

## 2022-11-18 NOTE — PROGRESS NOTES
HISTORY OF PRESENT ILLNESS  Zev Solorzano is a 71 y.o. female. HPI  Seen with five days of fatigue and then several days of left upper quadrant discomfort with a cramping feeling. Has had regular bowel movements, most recently today. No hematuria or dysuria. No fevers or chills. Interestingly, today she feels better than yesterday. Last night did not eat dinner. No nausea. Just did not feel hunger. Today, she is looking forward to lunch. No chest pain and is being monitored for AFib and there has been no recurrence since last year. Review of Systems   Constitutional:  Positive for malaise/fatigue. HENT:  Negative for congestion and sinus pain. Cardiovascular:  Negative for chest pain. Gastrointestinal:  Positive for abdominal pain. Negative for blood in stool, constipation, diarrhea, heartburn, nausea and vomiting. Genitourinary:  Negative for dysuria, flank pain, frequency, hematuria and urgency. Physical Exam  Vitals and nursing note reviewed. Constitutional:       Appearance: She is well-developed. She is not ill-appearing. HENT:      Head: Normocephalic and atraumatic. Neck:      Thyroid: No thyromegaly. Vascular: No carotid bruit. Cardiovascular:      Rate and Rhythm: Normal rate and regular rhythm. Heart sounds: Normal heart sounds, S1 normal and S2 normal. No murmur heard. Pulmonary:      Effort: Pulmonary effort is normal. No respiratory distress. Breath sounds: Normal breath sounds. No wheezing or rales. Abdominal:      General: Bowel sounds are normal. There is no distension. Palpations: Abdomen is soft. There is no mass. Tenderness: There is no abdominal tenderness. There is no right CVA tenderness or left CVA tenderness. Musculoskeletal:      Cervical back: Normal range of motion and neck supple. Neurological:      Mental Status: She is alert and oriented to person, place, and time.    Psychiatric:         Behavior: Behavior normal. ASSESSMENT and PLAN  Diagnoses and all orders for this visit:    1. Other fatigue-with  luq cramps now improved -likely viral check labs   [place] diet and gradually advance  -     TSH 3RD GENERATION; Future  -     CBC WITH AUTOMATED DIFF; Future  -     VITAMIN B12 & FOLATE; Future    2. HTN, goal below 130/80-controlled echo soon    3. PAF (paroxysmal atrial fibrillation) (HCC)-cont on eliquis  no recent episodes and no signs of bleeding check cbc    4. Dyslipidemia  -     METABOLIC PANEL, COMPREHENSIVE; Future  -     LIPID PANEL;  Future

## 2022-11-19 LAB
ALBUMIN SERPL-MCNC: 4.2 G/DL (ref 3.5–5)
ALBUMIN/GLOB SERPL: 1.4 {RATIO} (ref 1.1–2.2)
ALP SERPL-CCNC: 51 U/L (ref 45–117)
ALT SERPL-CCNC: 62 U/L (ref 12–78)
ANION GAP SERPL CALC-SCNC: 6 MMOL/L (ref 5–15)
AST SERPL-CCNC: 75 U/L (ref 15–37)
BASOPHILS # BLD: 0.1 K/UL (ref 0–0.1)
BASOPHILS NFR BLD: 1 % (ref 0–1)
BILIRUB SERPL-MCNC: 0.6 MG/DL (ref 0.2–1)
BUN SERPL-MCNC: 10 MG/DL (ref 6–20)
BUN/CREAT SERPL: 11 (ref 12–20)
CALCIUM SERPL-MCNC: 10.3 MG/DL (ref 8.5–10.1)
CHLORIDE SERPL-SCNC: 101 MMOL/L (ref 97–108)
CHOLEST SERPL-MCNC: 170 MG/DL
CO2 SERPL-SCNC: 32 MMOL/L (ref 21–32)
CREAT SERPL-MCNC: 0.9 MG/DL (ref 0.55–1.02)
DIFFERENTIAL METHOD BLD: ABNORMAL
EOSINOPHIL # BLD: 0.1 K/UL (ref 0–0.4)
EOSINOPHIL NFR BLD: 1 % (ref 0–7)
ERYTHROCYTE [DISTWIDTH] IN BLOOD BY AUTOMATED COUNT: 12.8 % (ref 11.5–14.5)
FOLATE SERPL-MCNC: >100 NG/ML (ref 5–21)
GLOBULIN SER CALC-MCNC: 3.1 G/DL (ref 2–4)
GLUCOSE SERPL-MCNC: 109 MG/DL (ref 65–100)
HCT VFR BLD AUTO: 40.3 % (ref 35–47)
HDLC SERPL-MCNC: 87 MG/DL
HDLC SERPL: 2 {RATIO} (ref 0–5)
HGB BLD-MCNC: 13 G/DL (ref 11.5–16)
IMM GRANULOCYTES # BLD AUTO: 0 K/UL (ref 0–0.04)
IMM GRANULOCYTES NFR BLD AUTO: 0 % (ref 0–0.5)
LDLC SERPL CALC-MCNC: 63.4 MG/DL (ref 0–100)
LYMPHOCYTES # BLD: 2.1 K/UL (ref 0.8–3.5)
LYMPHOCYTES NFR BLD: 30 % (ref 12–49)
MCH RBC QN AUTO: 32.4 PG (ref 26–34)
MCHC RBC AUTO-ENTMCNC: 32.3 G/DL (ref 30–36.5)
MCV RBC AUTO: 100.5 FL (ref 80–99)
MONOCYTES # BLD: 0.7 K/UL (ref 0–1)
MONOCYTES NFR BLD: 11 % (ref 5–13)
NEUTS SEG # BLD: 3.9 K/UL (ref 1.8–8)
NEUTS SEG NFR BLD: 57 % (ref 32–75)
NRBC # BLD: 0 K/UL (ref 0–0.01)
NRBC BLD-RTO: 0 PER 100 WBC
PLATELET # BLD AUTO: 212 K/UL (ref 150–400)
PMV BLD AUTO: 10.3 FL (ref 8.9–12.9)
POTASSIUM SERPL-SCNC: 4 MMOL/L (ref 3.5–5.1)
PROT SERPL-MCNC: 7.3 G/DL (ref 6.4–8.2)
RBC # BLD AUTO: 4.01 M/UL (ref 3.8–5.2)
SODIUM SERPL-SCNC: 139 MMOL/L (ref 136–145)
TRIGL SERPL-MCNC: 98 MG/DL (ref ?–150)
TSH SERPL DL<=0.05 MIU/L-ACNC: 1.13 UIU/ML (ref 0.36–3.74)
VIT B12 SERPL-MCNC: >2000 PG/ML (ref 193–986)
VLDLC SERPL CALC-MCNC: 19.6 MG/DL
WBC # BLD AUTO: 6.9 K/UL (ref 3.6–11)

## 2022-12-07 NOTE — PROGRESS NOTES
Patient notified of good b12 level & PCP's note that no extra vitamins are needed. Advised appt in 6 months for follow up. · 

## 2022-12-08 ENCOUNTER — OFFICE VISIT (OUTPATIENT)
Dept: CARDIOLOGY CLINIC | Age: 72
End: 2022-12-08
Payer: MEDICARE

## 2022-12-08 VITALS
OXYGEN SATURATION: 96 % | HEIGHT: 63 IN | SYSTOLIC BLOOD PRESSURE: 130 MMHG | DIASTOLIC BLOOD PRESSURE: 80 MMHG | HEART RATE: 80 BPM | WEIGHT: 121 LBS | BODY MASS INDEX: 21.44 KG/M2

## 2022-12-08 DIAGNOSIS — E78.5 DYSLIPIDEMIA: ICD-10-CM

## 2022-12-08 DIAGNOSIS — I10 HTN, GOAL BELOW 130/80: ICD-10-CM

## 2022-12-08 DIAGNOSIS — I34.1 MITRAL VALVE PROLAPSE: ICD-10-CM

## 2022-12-08 DIAGNOSIS — I48.0 PAROXYSMAL ATRIAL FIBRILLATION (HCC): Primary | ICD-10-CM

## 2022-12-08 PROCEDURE — G0463 HOSPITAL OUTPT CLINIC VISIT: HCPCS | Performed by: SPECIALIST

## 2022-12-08 PROCEDURE — 93005 ELECTROCARDIOGRAM TRACING: CPT | Performed by: SPECIALIST

## 2022-12-08 RX ORDER — AMLODIPINE BESYLATE 5 MG/1
5 TABLET ORAL DAILY
Qty: 90 TABLET | Refills: 3 | Status: SHIPPED | OUTPATIENT
Start: 2022-12-08

## 2022-12-08 NOTE — PROGRESS NOTES
Cynthia Mancilla MD. [facility]          Patient: Rona Cabrera  : 1950      Today's Date: 2022        HISTORY OF PRESENT ILLNESS:     History of Present Illness:  She says she is doing well. No complaints. No CP or sig SOB. PAST MEDICAL HISTORY:     Past Medical History:   Diagnosis Date    Dyslipidemia     HTN, goal below 130/80 2015    Hx of bone density study 2016    Osteopenic    Hx of mammogram 07/10/2017    Negtaive     Impaired glucose tolerance 2015    Mitral regurgitation     mild MR    Osteopenia 2015    PAF (paroxysmal atrial fibrillation) ([facility])     Routine Papanicolaou smear 2018    Neg pap        Past Surgical History:   Procedure Laterality Date    HX APPENDECTOMY  1963    HX  SECTION      HX COLONOSCOPY      HX ORTHOPAEDIC  13    right foot             CURRENT MEDICATIONS:    .  Current Outpatient Medications   Medication Sig Dispense Refill    lovastatin (MEVACOR) 20 mg tablet TAKE 1 TABLET BY MOUTH NIGHTLY 90 Tablet 1    atenoloL (TENORMIN) 50 mg tablet TAKE 1 TABLET BY MOUTH TWO TIMES DAILY 180 Tablet 1    olmesartan-hydroCHLOROthiazide (BENICAR HCT) 20-12.5 mg per tablet TAKE 1 TABLET BY MOUTH EVERY DAY 90 Tablet 3    amLODIPine (NORVASC) 2.5 mg tablet Take 1 Tablet by mouth daily. 90 Tablet 3    apixaban (ELIQUIS) 5 mg tablet Take 1 Tablet by mouth two (2) times a day. 180 Tablet 3    multivitamin (ONE A DAY) tablet Take 1 Tab by mouth daily. Ultimate calcium and coq10      TURMERIC ROOT EXTRACT PO Take  by mouth. ferrous fumarate/vit Bcomp,C (SUPER B COMPLEX PO) Take  by mouth daily. omega-3 fatty acids-vitamin e 1,000 mg cap Take 2 Caps by mouth daily. Cholecalciferol, Vitamin D3, (VITAMIN D3) 2,000 unit cap capsule Take 2,000 Units by mouth daily.  30 Cap 5       Allergies   Allergen Reactions    Shellfish Derived Diarrhea and Nausea and Vomiting         SOCIAL HISTORY:     Social History     Tobacco Use    Smoking status: Former     Packs/day: 0.75     Years: 15.00     Pack years: 11.25     Types: Cigarettes     Quit date: 9/1/2012     Years since quitting: 10.2    Smokeless tobacco: Never   Vaping Use    Vaping Use: Never used   Substance Use Topics    Alcohol use: Yes     Comment: occ    Drug use: No         FAMILY HISTORY:     Family History   Problem Relation Age of Onset    Hypertension Father     Hypertension Sister          REVIEW OF SYMPTOMS:     Review of Symptoms:  Constitutional: Negative for fever, chills  HEENT: Negative for nosebleeds, tinnitus, and vision changes. Respiratory: Negative for cough, wheezing  Cardiovascular: Negative for orthopnea, claudication, syncope, and PND. Gastrointestinal: Negative for abdominal pain, diarrhea, melena. Genitourinary: Negative for dysuria  Musculoskeletal: Negative for myalgias. Skin: Negative for rash  Heme: No problems bleeding. Neurological: Negative for speech change and focal weakness. PHYSICAL EXAM:     Physical Exam:  Visit Vitals  /80 (BP 1 Location: Left upper arm, BP Patient Position: Sitting)   Pulse 80   Ht 5' 3\" (1.6 m)   Wt 121 lb (54.9 kg)   LMP  (LMP Unknown)   SpO2 96%   BMI 21.43 kg/m²       Patient appears generally well, mood and affect are appropriate and pleasant. HEENT:  Hearing intact, non-icteric, normocephalic, atraumatic. Neck Exam: Supple  Lung Exam: Clear to auscultation, even breath sounds. Cardiac Exam: Regular rate and rhythm with 3/6 systolic murmur   Abdomen: Soft, non-tender  Extremities: Moves all ext well. No lower extremity edema.   MSKTL: Overall good ROM ext  Skin: No significant rashes  Psych: Appropriate affect  Neuro - Grossly intact        LABS / OTHER STUDIES:     Lab Results   Component Value Date/Time    Sodium 139 11/18/2022 11:50 AM    Potassium 4.0 11/18/2022 11:50 AM    Chloride 101 11/18/2022 11:50 AM    CO2 32 11/18/2022 11:50 AM    Anion gap 6 11/18/2022 11:50 AM    Glucose 109 (H) 11/18/2022 11:50 AM    BUN 10 11/18/2022 11:50 AM    Creatinine 0.90 11/18/2022 11:50 AM    BUN/Creatinine ratio 11 (L) 11/18/2022 11:50 AM    GFR est AA >60 03/31/2022 04:05 PM    GFR est non-AA >60 03/31/2022 04:05 PM    Calcium 10.3 (H) 11/18/2022 11:50 AM    Bilirubin, total 0.6 11/18/2022 11:50 AM    Alk. phosphatase 51 11/18/2022 11:50 AM    Protein, total 7.3 11/18/2022 11:50 AM    Albumin 4.2 11/18/2022 11:50 AM    Globulin 3.1 11/18/2022 11:50 AM    A-G Ratio 1.4 11/18/2022 11:50 AM    ALT (SGPT) 62 11/18/2022 11:50 AM    AST (SGOT) 75 (H) 11/18/2022 11:50 AM     Lab Results   Component Value Date/Time    WBC 6.9 11/18/2022 11:50 AM    HGB 13.0 11/18/2022 11:50 AM    HCT 40.3 11/18/2022 11:50 AM    PLATELET 333 57/72/8305 11:50 AM    .5 (H) 11/18/2022 11:50 AM     Lab Results   Component Value Date/Time    Cholesterol, total 170 11/18/2022 11:50 AM    HDL Cholesterol 87 11/18/2022 11:50 AM    LDL, calculated 63.4 11/18/2022 11:50 AM    VLDL, calculated 19.6 11/18/2022 11:50 AM    Triglyceride 98 11/18/2022 11:50 AM    CHOL/HDL Ratio 2.0 11/18/2022 11:50 AM       Lab Results   Component Value Date/Time    TSH 1.13 11/18/2022 11:50 AM           CARDIAC DIAGNOSTICS:     Cardiac Evaluation Includes: I  I reviewed the results below. EKG 3/1/19 - NSR, PVC  EKG - Apple Watch 11/20/21 - Afib,   EKG 11/24/21 - sinus elba, normal         Echo 2/3/17 - LVEF 60%, mild LAE, Mild MR, mild AI     Stress Echo 2/20/17 - walked 5:40 (10 METS), normal stress EKG and stress Echo      Echo 1/23/19 - I viewed echo myself - On my review - Normal LV size. LVEF 55%, mild post leaflet prolapse and mild-mod MR, mild AI, Asc 3.8 cm      Echo 3/12/20 -  LVEF 58%, Mitral valve thickening. Myxomatous mitral valve disease. Mild mitral valve prolapse. Late systolic mitral valve prolapse. Moderate mitral valve regurgitation is present. Dilated sinuses of Valsalva; diameter is 3.5 cm.  Dilated ascending aorta; diameter is 3.7 cm. Echo 3/17/21 - LVEF 55-60%. Mild MVP and mild MR. Ao 3.58      Echo 11/24/21 - LVEF 60%, MVP with moderate MR, mild LAE     Exercise Cardiolite 12/10/21 - Walked 5:54 min (7 METS). Normal stress EKG. Normal MPI. LVEF 70%     30 day Event Monitor 12/4/21 - NSR, HR , Avg 64 bpm.  Single 6 beat run of NSVT. No Afib. Echo 6/21/22 - LVEF 55-60%, AV sclerosis. Mitral Valve: Mild leaflet prolapse noted of the posterior leaflet. Moderate regurgitation. Mild LAE. Mildly dilated ascending aorta, 3.8cm. ASSESSMENT AND PLAN:      Assessment and Plan:     1) PAF  - she had Afib one time in distant past in setting of PNA  - On 11/22/21 - Dr. Fong Decent that his mom went into Afib a few nights ago ('s) - converted to NSR overnight on extra atenolol. He started her on Eliquis. Sounds like she has had a couple of spells of Afib with RVR despite high dose atenolol  - 30 day Event monitor 12/21 was normal (no Afib)   - On 1/13/22 she says she feels perfectly fine.  ---> For now will continue atenolol and OAC. If she has more Afib, then would plan to evaluate her MV more closely (with a NATHALY) and consider MV repair if she has severe MR. Could also start an AAD then and consider Afib ablation (with or without valve repair accordingly)   - On 12/8/22 - She says is doing great - cont BB  - Continue with Eliquis 5 mg BID     2) Mild MVP and mild-mod MR  - I viewed 1/19 echo images myself. Very mild prolapse of posterior leaflet with at most mild-mod MR.  LVEF is normal.   - Echo 11/24/21 with mod MR and mild LAE   - Echo 6/21/22 - LVEF 55-60%, AV sclerosis. Mitral Valve: Mild leaflet prolapse noted of the posterior leaflet. Moderate regurgitation. Mild LAE. Mildly dilated ascending aorta, 3.8cm.   - Lately - She says she is feeling \"perfectly fine\" - denies sig SOB   - will plan to check serial echoes      3) Ascending aorta minimally dilated to upper normal size (3.8 cm) on echo 1/19  - follow on serial studies   - Aim for good BP control      4) HTN  - Her BP borderline high at home --most SBP > 130's ---> will increase amlodipine to 5 mg daily   - continue other meds and follow BP at home      5) See me in 6 months with an echo. She is originally from [facility]. She has 3 boys (one is in [facility] and another in [facility]). Clifton Montoya MD, [address], [address]  [place] [address]  [address]  [address]  Ph: [phone]    [phone]

## 2022-12-08 NOTE — PROGRESS NOTES
Chief Complaint   Patient presents with    Follow-up     6 mo     Hypertension    Cholesterol Problem    Other     PAF     Vitals:    12/08/22 1423   BP: 130/80   BP 1 Location: Left upper arm   BP Patient Position: Sitting   Pulse: 80   Height: 5' 3\" (1.6 m)   Weight: 121 lb (54.9 kg)   SpO2: 96%       Chest pain denied     SOB denied     Palpitations denied     Swelling in hands/feet denied     Dizziness denied     Recent hospital stays denied     Refills denied

## 2022-12-24 DIAGNOSIS — E78.5 DYSLIPIDEMIA: ICD-10-CM

## 2022-12-24 DIAGNOSIS — I10 HTN, GOAL BELOW 130/80: ICD-10-CM

## 2022-12-26 RX ORDER — ATENOLOL 50 MG/1
TABLET ORAL
Qty: 180 TABLET | Refills: 1 | Status: SHIPPED | OUTPATIENT
Start: 2022-12-26

## 2022-12-26 RX ORDER — LOVASTATIN 20 MG/1
TABLET ORAL
Qty: 90 TABLET | Refills: 1 | Status: SHIPPED | OUTPATIENT
Start: 2022-12-26

## 2023-01-05 DIAGNOSIS — I49.9 IRREGULAR HEART RATE: ICD-10-CM

## 2023-01-05 DIAGNOSIS — R06.02 SOB (SHORTNESS OF BREATH): ICD-10-CM

## 2023-01-05 DIAGNOSIS — R00.0 TACHYCARDIA: ICD-10-CM

## 2023-01-05 RX ORDER — APIXABAN 5 MG/1
TABLET, FILM COATED ORAL
Qty: 180 TABLET | Refills: 3 | Status: SHIPPED | OUTPATIENT
Start: 2023-01-05

## 2023-04-03 ENCOUNTER — OFFICE VISIT (OUTPATIENT)
Dept: INTERNAL MEDICINE CLINIC | Age: 73
End: 2023-04-03
Payer: MEDICARE

## 2023-04-03 DIAGNOSIS — R73.02 IMPAIRED GLUCOSE TOLERANCE: ICD-10-CM

## 2023-04-03 DIAGNOSIS — Z78.0 POSTMENOPAUSAL: ICD-10-CM

## 2023-04-03 DIAGNOSIS — Z79.01 CHRONIC ANTICOAGULATION: ICD-10-CM

## 2023-04-03 DIAGNOSIS — Z00.00 MEDICARE ANNUAL WELLNESS VISIT, SUBSEQUENT: Primary | ICD-10-CM

## 2023-04-03 DIAGNOSIS — K76.0 NAFLD (NONALCOHOLIC FATTY LIVER DISEASE): ICD-10-CM

## 2023-04-03 DIAGNOSIS — I10 HTN, GOAL BELOW 130/80: ICD-10-CM

## 2023-04-03 DIAGNOSIS — Z12.11 COLON CANCER SCREENING: ICD-10-CM

## 2023-04-03 DIAGNOSIS — I48.0 PAROXYSMAL ATRIAL FIBRILLATION (HCC): ICD-10-CM

## 2023-04-03 DIAGNOSIS — E78.5 DYSLIPIDEMIA: ICD-10-CM

## 2023-04-03 PROCEDURE — G0463 HOSPITAL OUTPT CLINIC VISIT: HCPCS | Performed by: INTERNAL MEDICINE

## 2023-04-03 PROCEDURE — G0439 PPPS, SUBSEQ VISIT: HCPCS | Performed by: INTERNAL MEDICINE

## 2023-04-03 PROCEDURE — G9899 SCRN MAM PERF RSLTS DOC: HCPCS | Performed by: INTERNAL MEDICINE

## 2023-04-03 PROCEDURE — G8510 SCR DEP NEG, NO PLAN REQD: HCPCS | Performed by: INTERNAL MEDICINE

## 2023-04-03 PROCEDURE — 1090F PRES/ABSN URINE INCON ASSESS: CPT | Performed by: INTERNAL MEDICINE

## 2023-04-03 PROCEDURE — G8420 CALC BMI NORM PARAMETERS: HCPCS | Performed by: INTERNAL MEDICINE

## 2023-04-03 PROCEDURE — 1101F PT FALLS ASSESS-DOCD LE1/YR: CPT | Performed by: INTERNAL MEDICINE

## 2023-04-03 PROCEDURE — 99214 OFFICE O/P EST MOD 30 MIN: CPT | Performed by: INTERNAL MEDICINE

## 2023-04-03 PROCEDURE — G8427 DOCREV CUR MEDS BY ELIG CLIN: HCPCS | Performed by: INTERNAL MEDICINE

## 2023-04-03 PROCEDURE — G8536 NO DOC ELDER MAL SCRN: HCPCS | Performed by: INTERNAL MEDICINE

## 2023-04-03 PROCEDURE — G8399 PT W/DXA RESULTS DOCUMENT: HCPCS | Performed by: INTERNAL MEDICINE

## 2023-04-03 PROCEDURE — 3017F COLORECTAL CA SCREEN DOC REV: CPT | Performed by: INTERNAL MEDICINE

## 2023-04-03 NOTE — PROGRESS NOTES
HISTORY OF PRESENT ILLNESS  Read Coty is a 68 y.o. female. [facility]  Annual wellness visit and follow up on issues:  1. Chronic anticoagulation for paroxysmal a-fib. Tolerating Eliquis. No bleeding. No GI upset. No palpitations. Sees Dr. Yanni Cha regularly and has an echo scheduled this June. Has mitral regurge. 2. Hypertension, on Amlodipine 5, Olmesartan/HCTZ 20/12.5 and Atenolol 50. No dizzy spells. No edema. No palpitations. 3. Early cataract, followed by Dr. Ryan Holt. Not impairing driving or vision. 4. Osteopenia. Last bone density over three years ago, will repeat. Social History:  She smoked off and on for about 15 years less than a PPD, none since 2012. Occasional alcohol. Lives alone, very independent. Preventive Care:  Has had two colonoscopies, 1999 and 2011. Discussed colon cancer screening and we will proceed with Cologuard. Up to date on mammograms and vaccines. Review of Systems   Constitutional:  Negative for chills, diaphoresis, fever and weight loss. HENT:  Positive for hearing loss. Negative for sore throat. Respiratory:  Negative for cough, shortness of breath and wheezing. Cardiovascular:  Negative for chest pain, palpitations, orthopnea, leg swelling and PND. Gastrointestinal:  Negative for abdominal pain, diarrhea, heartburn, nausea and vomiting. Musculoskeletal:  Negative for back pain, falls, myalgias and neck pain. Skin:  Negative for rash. Neurological:  Negative for dizziness, focal weakness, seizures and headaches. Endo/Heme/Allergies:  Does not bruise/bleed easily. Psychiatric/Behavioral:  Negative for depression and memory loss. Physical Exam  Vitals and nursing note reviewed. Constitutional:       Appearance: She is well-developed. HENT:      Head: Normocephalic and atraumatic. Right Ear: There is impacted cerumen.       Left Ear: Tympanic membrane normal.   Eyes:      Pupils: Pupils are equal, round, and reactive to light.   Neck:      Thyroid: No thyromegaly. Vascular: No carotid bruit. Cardiovascular:      Rate and Rhythm: Normal rate and regular rhythm. Heart sounds: S1 normal and S2 normal. Murmur heard. Pulmonary:      Effort: Pulmonary effort is normal. No respiratory distress. Breath sounds: Normal breath sounds. No wheezing or rales. Abdominal:      General: There is no distension. Palpations: Abdomen is soft. There is no mass. Tenderness: There is no abdominal tenderness. Genitourinary:     Comments: Breast exam bilaterally without masses axillary nodes or discharge. BSE reviewed     Musculoskeletal:      Cervical back: Normal range of motion and neck supple. Right lower leg: No edema. Left lower leg: No edema. Skin:     Findings: No lesion or rash. Neurological:      Mental Status: She is alert and oriented to person, place, and time. Psychiatric:         Mood and Affect: Mood normal.         Behavior: Behavior normal.       ASSESSMENT and PLAN  Diagnoses and all orders for this visit:    1. Medicare annual wellness visit, subsequent  -     URINALYSIS W/ RFLX MICROSCOPIC; Future    2. Paroxysmal atrial fibrillation (HCC)-cont the eliquis and atenolol  Echo this spring planned    3. HTN, goal below 130/80-well controlled on meds    4. Dyslipidemia  -     METABOLIC PANEL, COMPREHENSIVE; Future  -     LIPID PANEL; Future  -     TSH 3RD GENERATION; Future    5. Impaired glucose tolerance  -     HEMOGLOBIN A1C WITH EAG; Future    6. NAFLD (nonalcoholic fatty liver disease)  Follows a healthy diet   7. Postmenopausal  -     DEXA BONE DENSITY STUDY AXIAL; Future    8. Colon cancer screening  -     COLOGUARD TEST (FECAL DNA COLORECTAL CANCER SCREENING)  -     CBC WITH AUTOMATED DIFF; Future    9. Chronic anticoagulation  -     CBC WITH AUTOMATED DIFF;  Future  This is the Subsequent Medicare Annual Wellness Exam, performed 12 months or more after the Initial AWV or the last Subsequent AWV    I have reviewed the patient's medical history in detail and updated the computerized patient record. Assessment/Plan   Education and counseling provided:  Are appropriate based on today's review and evaluation  Influenza Vaccine  Screening Mammography  Colorectal cancer screening tests  Bone mass measurement (DEXA)  Screening for glaucoma    1. Medicare annual wellness visit, subsequent  -     URINALYSIS W/ RFLX MICROSCOPIC; Future  2. Paroxysmal atrial fibrillation (HCC)  3. HTN, goal below 130/80  4. Dyslipidemia  -     METABOLIC PANEL, COMPREHENSIVE; Future  -     LIPID PANEL; Future  -     TSH 3RD GENERATION; Future  5. Impaired glucose tolerance  -     HEMOGLOBIN A1C WITH EAG; Future  6. NAFLD (nonalcoholic fatty liver disease)  7. Postmenopausal  -     DEXA BONE DENSITY STUDY AXIAL; Future  8. Colon cancer screening  -     COLOGUARD TEST (FECAL DNA COLORECTAL CANCER SCREENING)  -     CBC WITH AUTOMATED DIFF; Future  9. Chronic anticoagulation  -     CBC WITH AUTOMATED DIFF; Future       Depression Risk Factor Screening     3 most recent PHQ Screens 4/3/2023   Little interest or pleasure in doing things Not at all   Feeling down, depressed, irritable, or hopeless Not at all   Total Score PHQ 2 0       Alcohol & Drug Abuse Risk Screen   Do you average more than 1 drink per night or more than 7 drinks a week?: (P) Yes  On any one occasion in the past three months have you had more than 3 drinks containing alcohol?: (P) No          Functional Ability and Level of Safety   Hearing:  Hearing: (P) additional comments below  Hearing comments: (P) I started to loose some hearing about 3 years ago    Activities of Daily Living: The home contains: (P) no safety equipment, rugs  Functional ADLs: (P) Patient does total self care   Ambulation:  Patient ambulates: (P) with no difficulty   Fall Risk:  Fall Risk Assessment, last 12 mths 4/3/2023   Able to walk?  Yes   Fall in past 12 months? 0   Do you feel unsteady?  0   Are you worried about falling 0     Abuse Screen:  Do you ever feel afraid of your partner?: (P) No  Are you in a relationship with someone who physically or mentally threatens you?: (P) No  Is it safe for you to go home?: (P) Yes      Cognitive Screening   Has your family/caregiver stated any concerns about your memory?: (P) No   Cognitive Screening: Normal - Verbal Fluency Test    Health Maintenance Due     Health Maintenance Due   Topic Date Due    Colorectal Cancer Screening Combo  2021    Medicare Yearly Exam  2023       Patient Care Team   Patient Care Team:  Raoul Gloria MD as PCP - General (Internal Medicine Physician)  Raoul Gloria MD as PCP - [facility]  Rodrigo Hannon MD (Cardiovascular Disease Physician)    History     Patient Active Problem List   Diagnosis Code    FHx: AAA [id number]    HTN, goal below 130/80 I10    Dyslipidemia E78.5    Impaired glucose tolerance R73.02    NAFLD (nonalcoholic fatty liver disease) K76.0    Osteopenia M85.80    Pyuria R82.81    Mitral regurgitation I34.0    PAF (paroxysmal atrial fibrillation) ([facility] [address] I48.0     Past Medical History:   Diagnosis Date    Dyslipidemia     HTN, goal below 130/80 2015    Hx of bone density study 2016    Osteopenic    Hx of mammogram 07/10/2017    Negtaive     Impaired glucose tolerance 2015    Mitral regurgitation     mild MR    Osteopenia 2015    PAF (paroxysmal atrial fibrillation) ([facility] [address]     Routine Papanicolaou smear 2018    Neg pap       Past Surgical History:   Procedure Laterality Date    HX APPENDECTOMY  1963    HX  SECTION      HX COLONOSCOPY      HX ORTHOPAEDIC  13    right foot     Current Outpatient Medications   Medication Sig Dispense Refill    Eliquis 5 mg tablet TAKE 1 TABLET BY MOUTH TWO TIMES DAILY 180 Tablet 3    atenoloL (TENORMIN) 50 mg tablet TAKE 1 TABLET BY MOUTH TWO TIMES DAILY 180 Tablet 1    lovastatin (MEVACOR) 20 mg tablet TAKE 1 TABLET BY MOUTH NIGHTLY 90 Tablet 1    amLODIPine (NORVASC) 5 mg tablet Take 1 Tablet by mouth daily. 90 Tablet 3    olmesartan-hydroCHLOROthiazide (BENICAR HCT) 20-12.5 mg per tablet TAKE 1 TABLET BY MOUTH EVERY DAY 90 Tablet 3    multivitamin (ONE A DAY) tablet Take 1 Tab by mouth daily. Ultimate calcium and coq10      TURMERIC ROOT EXTRACT PO Take  by mouth. ferrous fumarate/vit Bcomp,C (SUPER B COMPLEX PO) Take  by mouth daily. omega-3 fatty acids-vitamin e 1,000 mg cap Take 2 Caps by mouth daily. Cholecalciferol, Vitamin D3, (VITAMIN D3) 2,000 unit cap capsule Take 2,000 Units by mouth daily.  30 Cap 5     Allergies   Allergen Reactions    Shellfish Derived Diarrhea and Nausea and Vomiting       Family History   Problem Relation Age of Onset    Hypertension Father     Hypertension Sister      Social History     Tobacco Use    Smoking status: Former     Packs/day: 0.75     Years: 15.00     Pack years: 11.25     Types: Cigarettes     Quit date: 9/1/2012     Years since quitting: 10.5    Smokeless tobacco: Never   Substance Use Topics    Alcohol use: Yes     Comment: Hank Nicolas MD

## 2023-04-03 NOTE — PATIENT INSTRUCTIONS
Medicare Wellness Visit, Female     The best way to live healthy is to have a lifestyle where you eat a well-balanced diet, exercise regularly, limit alcohol use, and quit all forms of tobacco/nicotine, if applicable. Regular preventive services are another way to keep healthy. Preventive services (vaccines, screening tests, monitoring & exams) can help personalize your care plan, which helps you manage your own care. Screening tests can find health problems at the earliest stages, when they are easiest to treat. [facility] follows the current, evidence-based guidelines published by the [facility] ([facility]) when recommending preventive services for our patients. Because we follow these guidelines, sometimes recommendations change over time as research supports it. (For example, mammograms used to be recommended annually. Even though Medicare will still pay for an annual mammogram, the newer guidelines recommend a mammogram every two years for women of average risk). Of course, you and your doctor may decide to screen more often for some diseases, based on your risk and your co-morbidities (chronic disease you are already diagnosed with). Preventive services for you include:  - Medicare offers their members a free annual wellness visit, which is time for you and your primary care provider to discuss and plan for your preventive service needs.  Take advantage of this benefit every year!    -Over the age of 72 should receive the recommended pneumonia vaccines.    -All adults should have a flu vaccine yearly.  -All adults should have a tetanus vaccine every 10 years.   -Over the age 48 should receive the shingles vaccines.        -All adults should be screened once for Hepatitis C.  -All adults age 38-68 who are overweight should have a diabetes screening test once every three years.   -Other screening tests and preventive services for persons with diabetes include: an eye exam to screen for diabetic retinopathy, a kidney function test, a foot exam, and stricter control over your cholesterol.   -Cardiovascular screening for adults with routine risk involves an electrocardiogram (ECG) at intervals determined by your doctor.     -Colorectal cancer screenings should be done for adults age 39-70 with no increased risk factors for colorectal cancer. There are a number of acceptable methods of screening for this type of cancer. Each test has its own benefits and drawbacks. Discuss with your doctor what is most appropriate for you during your annual wellness visit. The different tests include: colonoscopy (considered the best screening method), a fecal occult blood test, a fecal DNA test, and sigmoidoscopy.    -Lung cancer screening is recommended annually with a low dose CT scan for adults between age 54 and 68, who have smoked at least 30 pack years (equivalent of 1 pack per day for 30 days), and who is a current smoker or quit less than 15 years ago.    -A bone mass density test is recommended when a woman turns 65 to screen for osteoporosis. This test is only recommended one time, as a screening. Some providers will use this same test as a disease monitoring tool if you already have osteoporosis. -Breast cancer screenings are recommended every other year for women of normal risk, age 54-69.    -Cervical cancer screenings for women over age 72 are only recommended with certain risk factors.      Here is a list of your current Health Maintenance items (your personalized list of preventive services) with a due date:  Health Maintenance Due   Topic Date Due    Colorectal Screening  06/12/2021    Annual Well Visit  04/01/2023

## 2023-04-04 LAB
ALBUMIN SERPL-MCNC: 4.4 G/DL (ref 3.5–5)
ALBUMIN/GLOB SERPL: 1.4 (ref 1.1–2.2)
ALP SERPL-CCNC: 53 U/L (ref 45–117)
ALT SERPL-CCNC: 48 U/L (ref 12–78)
ANION GAP SERPL CALC-SCNC: 6 MMOL/L (ref 5–15)
APPEARANCE UR: CLEAR
AST SERPL-CCNC: 46 U/L (ref 15–37)
BACTERIA URNS QL MICRO: NEGATIVE /HPF
BASOPHILS # BLD: 0.1 K/UL (ref 0–0.1)
BASOPHILS NFR BLD: 1 % (ref 0–1)
BILIRUB SERPL-MCNC: 0.6 MG/DL (ref 0.2–1)
BILIRUB UR QL: NEGATIVE
BUN SERPL-MCNC: 14 MG/DL (ref 6–20)
BUN/CREAT SERPL: 15 (ref 12–20)
CALCIUM SERPL-MCNC: 9.3 MG/DL (ref 8.5–10.1)
CHLORIDE SERPL-SCNC: 101 MMOL/L (ref 97–108)
CHOLEST SERPL-MCNC: 205 MG/DL
CO2 SERPL-SCNC: 30 MMOL/L (ref 21–32)
COLOR UR: ABNORMAL
CREAT SERPL-MCNC: 0.91 MG/DL (ref 0.55–1.02)
DIFFERENTIAL METHOD BLD: ABNORMAL
EOSINOPHIL # BLD: 0.1 K/UL (ref 0–0.4)
EOSINOPHIL NFR BLD: 1 % (ref 0–7)
EPITH CASTS URNS QL MICRO: ABNORMAL /LPF
ERYTHROCYTE [DISTWIDTH] IN BLOOD BY AUTOMATED COUNT: 12.7 % (ref 11.5–14.5)
EST. AVERAGE GLUCOSE BLD GHB EST-MCNC: 108 MG/DL
GLOBULIN SER CALC-MCNC: 3.1 G/DL (ref 2–4)
GLUCOSE SERPL-MCNC: 108 MG/DL (ref 65–100)
GLUCOSE UR STRIP.AUTO-MCNC: NEGATIVE MG/DL
HBA1C MFR BLD: 5.4 % (ref 4–5.6)
HCT VFR BLD AUTO: 41.6 % (ref 35–47)
HDLC SERPL-MCNC: 112 MG/DL
HDLC SERPL: 1.8 (ref 0–5)
HGB BLD-MCNC: 12.8 G/DL (ref 11.5–16)
HGB UR QL STRIP: NEGATIVE
HYALINE CASTS URNS QL MICRO: ABNORMAL /LPF (ref 0–5)
IMM GRANULOCYTES # BLD AUTO: 0 K/UL (ref 0–0.04)
IMM GRANULOCYTES NFR BLD AUTO: 0 % (ref 0–0.5)
KETONES UR QL STRIP.AUTO: NEGATIVE MG/DL
LDLC SERPL CALC-MCNC: 66.2 MG/DL (ref 0–100)
LEUKOCYTE ESTERASE UR QL STRIP.AUTO: ABNORMAL
LYMPHOCYTES # BLD: 2.7 K/UL (ref 0.8–3.5)
LYMPHOCYTES NFR BLD: 35 % (ref 12–49)
MCH RBC QN AUTO: 32.4 PG (ref 26–34)
MCHC RBC AUTO-ENTMCNC: 30.8 G/DL (ref 30–36.5)
MCV RBC AUTO: 105.3 FL (ref 80–99)
MONOCYTES # BLD: 0.7 K/UL (ref 0–1)
MONOCYTES NFR BLD: 9 % (ref 5–13)
NEUTS SEG # BLD: 4.2 K/UL (ref 1.8–8)
NEUTS SEG NFR BLD: 54 % (ref 32–75)
NITRITE UR QL STRIP.AUTO: NEGATIVE
NRBC # BLD: 0 K/UL (ref 0–0.01)
NRBC BLD-RTO: 0 PER 100 WBC
PH UR STRIP: 6.5 (ref 5–8)
PLATELET # BLD AUTO: 217 K/UL (ref 150–400)
PMV BLD AUTO: 10.6 FL (ref 8.9–12.9)
POTASSIUM SERPL-SCNC: 3.7 MMOL/L (ref 3.5–5.1)
PROT SERPL-MCNC: 7.5 G/DL (ref 6.4–8.2)
PROT UR STRIP-MCNC: NEGATIVE MG/DL
RBC # BLD AUTO: 3.95 M/UL (ref 3.8–5.2)
RBC #/AREA URNS HPF: ABNORMAL /HPF (ref 0–5)
SODIUM SERPL-SCNC: 137 MMOL/L (ref 136–145)
SP GR UR REFRACTOMETRY: 1.01 (ref 1–1.03)
TRIGL SERPL-MCNC: 134 MG/DL (ref ?–150)
TSH SERPL DL<=0.05 MIU/L-ACNC: 1.26 UIU/ML (ref 0.36–3.74)
UROBILINOGEN UR QL STRIP.AUTO: 0.2 EU/DL (ref 0.2–1)
VLDLC SERPL CALC-MCNC: 26.8 MG/DL
WBC # BLD AUTO: 7.7 K/UL (ref 3.6–11)
WBC URNS QL MICRO: ABNORMAL /HPF (ref 0–4)

## 2023-04-19 ENCOUNTER — HOSPITAL ENCOUNTER (OUTPATIENT)
Dept: MAMMOGRAPHY | Age: 73
Discharge: HOME | End: 2023-04-19
Attending: INTERNAL MEDICINE
Payer: MEDICARE

## 2023-04-19 DIAGNOSIS — Z78.0 POSTMENOPAUSAL: ICD-10-CM

## 2023-04-19 PROCEDURE — 77080 DXA BONE DENSITY AXIAL: CPT

## 2023-05-04 ENCOUNTER — TRANSCRIBE ORDERS (OUTPATIENT)
Facility: HOSPITAL | Age: 73
End: 2023-05-04

## 2023-05-04 DIAGNOSIS — Z12.31 OTHER SCREENING MAMMOGRAM: Primary | ICD-10-CM

## 2023-05-23 RX ORDER — OLMESARTAN MEDOXOMIL AND HYDROCHLOROTHIAZIDE 20/12.5 20; 12.5 MG/1; MG/1
1 TABLET ORAL DAILY
COMMUNITY
Start: 2022-05-31 | End: 2023-05-30

## 2023-05-23 RX ORDER — AMLODIPINE BESYLATE 5 MG/1
5 TABLET ORAL DAILY
COMMUNITY
Start: 2022-12-08

## 2023-05-23 RX ORDER — ATENOLOL 50 MG/1
1 TABLET ORAL 2 TIMES DAILY
COMMUNITY
Start: 2022-12-26 | End: 2023-06-22

## 2023-05-23 RX ORDER — LOVASTATIN 20 MG/1
1 TABLET ORAL NIGHTLY
COMMUNITY
Start: 2022-12-26 | End: 2023-06-22

## 2023-05-30 RX ORDER — OLMESARTAN MEDOXOMIL AND HYDROCHLOROTHIAZIDE 20/12.5 20; 12.5 MG/1; MG/1
TABLET ORAL
Qty: 90 TABLET | Refills: 0 | Status: SHIPPED | OUTPATIENT
Start: 2023-05-30

## 2023-06-05 ENCOUNTER — HOSPITAL ENCOUNTER (OUTPATIENT)
Facility: HOSPITAL | Age: 73
Discharge: HOME | End: 2023-06-08
Payer: MEDICARE

## 2023-06-05 DIAGNOSIS — Z12.31 OTHER SCREENING MAMMOGRAM: ICD-10-CM

## 2023-06-05 PROCEDURE — 77067 SCR MAMMO BI INCL CAD: CPT

## 2023-06-22 DIAGNOSIS — E78.49 OTHER HYPERLIPIDEMIA: Primary | ICD-10-CM

## 2023-06-22 DIAGNOSIS — I10 ESSENTIAL (PRIMARY) HYPERTENSION: ICD-10-CM

## 2023-06-22 RX ORDER — LOVASTATIN 20 MG/1
TABLET ORAL NIGHTLY
Qty: 90 TABLET | Refills: 1 | Status: SHIPPED | OUTPATIENT
Start: 2023-06-22

## 2023-06-22 RX ORDER — ATENOLOL 50 MG/1
TABLET ORAL
Qty: 180 TABLET | Refills: 1 | Status: SHIPPED | OUTPATIENT
Start: 2023-06-22

## 2023-08-25 RX ORDER — OLMESARTAN MEDOXOMIL AND HYDROCHLOROTHIAZIDE 20/12.5 20; 12.5 MG/1; MG/1
TABLET ORAL
Qty: 90 TABLET | Refills: 3 | Status: SHIPPED | OUTPATIENT
Start: 2023-08-25

## 2023-08-25 NOTE — TELEPHONE ENCOUNTER
Refill per VO of Dr. Jacquelin Kirk  Last appt: 6/12/2023    Future Appointments   Date Time Provider [address]   12/21/2023 10:00 AM Franko La MD [facility] BS AMB       Requested Prescriptions     Signed Prescriptions Disp Refills    olmesartan-hydroCHLOROthiazide (BENICAR HCT) 20-12.5 MG per tablet 90 tablet 3     Sig: TAKE 1 TABLET BY MOUTH EVERY DAY     Authorizing Provider: Franko La     Ordering User: Elijah Mtaos

## 2023-10-19 DIAGNOSIS — M54.2 NECK PAIN: Primary | ICD-10-CM

## 2023-10-27 ENCOUNTER — HOSPITAL ENCOUNTER (OUTPATIENT)
Facility: HOSPITAL | Age: 73
Setting detail: RECURRING SERIES
Discharge: HOME | End: 2023-10-30
Payer: MEDICARE

## 2023-10-27 PROCEDURE — 97110 THERAPEUTIC EXERCISES: CPT | Performed by: PHYSICAL THERAPIST

## 2023-10-27 PROCEDURE — 97162 PT EVAL MOD COMPLEX 30 MIN: CPT | Performed by: PHYSICAL THERAPIST

## 2023-10-27 PROCEDURE — 97140 MANUAL THERAPY 1/> REGIONS: CPT | Performed by: PHYSICAL THERAPIST

## 2023-10-27 PROCEDURE — G0283 ELEC STIM OTHER THAN WOUND: HCPCS | Performed by: PHYSICAL THERAPIST

## 2023-10-27 NOTE — PROGRESS NOTES
Physical Therapy at [facility],   a part of [address] [place] [address], [facility] [address]  Phone: [phone]  Fax: [phone]       PHYSICAL THERAPY - MEDICARE EVALUATION/PLAN OF CARE NOTE (updated 3/23)      Date: 10/27/2023          Patient Name:  Dai Rajan :  1950   Medical   Diagnosis:  Neck pain [M54.2] Treatment Diagnosis:  M54.2  NECK PAIN    Referral Source:  Shanthi Barahona MD Provider #:  [id number]                Insurance: Payor: MEDICARE / Plan: MEDICARE PART A AND B / Product Type: *No Product type* /      Patient  verified yes     Visit #   Current  / Total 1 24   Time   In / Out 10:45 AM 11:45 AM   Total Treatment Time 60   Total Timed Codes 30   1:1 Treatment Time 30    [facility] BC Totals Reminder:  bill using total billable   min of TIMED therapeutic procedures and modalities. 8-22 min = 1 unit; 23-37 min = 2 units; 38-52 min = 3 units;  53-67 min = 4 units; 68-82 min = 5 units         SUBJECTIVE  Pain Level (0-10 scale): 2/10  [x]constant []intermittent [x]improving []worsening []no change since onset    Any medication changes, allergies to medications, adverse drug reactions, diagnosis change, or new procedure performed?: [x] No    [] Yes (see summary sheet for update)  Medications: Verified on Patient Summary List    Subjective functional status/changes:     Left-sided neck pain    Start of Care: 10/27/2023  Onset Date: 10/11/23  Current symptoms/Complaints: Pain and stiffness along the right-sided of the neck. It has improved significantly this past week, but she still would like therapy to address muscle tension and a lack of ROM.   Mechanism of Injury: Insidious  PLOF: Active with gardening and knitting  Limitations to PLOF/Activity or Recreational Limitations: none  Work Hx: Retired  Living Situation: Living alone in 2 story home  Mobility: No limitations with ambulation, transfers, and bed mobility  Self

## 2023-10-31 ENCOUNTER — HOSPITAL ENCOUNTER (OUTPATIENT)
Facility: HOSPITAL | Age: 73
Setting detail: RECURRING SERIES
Discharge: HOME | End: 2023-11-03
Payer: MEDICARE

## 2023-10-31 PROCEDURE — 97110 THERAPEUTIC EXERCISES: CPT | Performed by: PHYSICAL THERAPIST

## 2023-11-27 RX ORDER — AMLODIPINE BESYLATE 5 MG/1
5 TABLET ORAL DAILY
Qty: 90 TABLET | Refills: 3 | Status: SHIPPED | OUTPATIENT
Start: 2023-11-27

## 2023-11-27 NOTE — TELEPHONE ENCOUNTER
Refill per VO of Dr. Jose Albarado  Last appt: 6/12/2023    Future Appointments   Date Time Provider [address] [place] [address]   12/21/2023 10:00 AM Carlos Pimentel MD [facility] BS AMB       Requested Prescriptions     Pending Prescriptions Disp Refills    amLODIPine (NORVASC) 5 MG tablet [Pharmacy Med Name: AMLODIPINE BESYLATE 5 MG TAB] 90 tablet 3     Sig: TAKE 1 TABLET BY MOUTH EVERY DAY

## 2023-12-05 ENCOUNTER — OFFICE VISIT (OUTPATIENT)
Age: 73
End: 2023-12-05
Payer: MEDICARE

## 2023-12-05 VITALS
HEART RATE: 74 BPM | DIASTOLIC BLOOD PRESSURE: 82 MMHG | WEIGHT: 119 LBS | BODY MASS INDEX: 21.09 KG/M2 | RESPIRATION RATE: 16 BRPM | TEMPERATURE: 98 F | SYSTOLIC BLOOD PRESSURE: 137 MMHG | HEIGHT: 63 IN | OXYGEN SATURATION: 97 %

## 2023-12-05 DIAGNOSIS — E56.9 VITAMIN DEFICIENCY: ICD-10-CM

## 2023-12-05 DIAGNOSIS — I10 HTN, GOAL BELOW 130/80: ICD-10-CM

## 2023-12-05 DIAGNOSIS — R00.2 PALPITATIONS: Primary | ICD-10-CM

## 2023-12-05 DIAGNOSIS — R53.83 OTHER FATIGUE: ICD-10-CM

## 2023-12-05 DIAGNOSIS — G62.9 NEUROPATHY: ICD-10-CM

## 2023-12-05 PROCEDURE — G8420 CALC BMI NORM PARAMETERS: HCPCS | Performed by: INTERNAL MEDICINE

## 2023-12-05 PROCEDURE — G8399 PT W/DXA RESULTS DOCUMENT: HCPCS | Performed by: INTERNAL MEDICINE

## 2023-12-05 PROCEDURE — G8427 DOCREV CUR MEDS BY ELIG CLIN: HCPCS | Performed by: INTERNAL MEDICINE

## 2023-12-05 PROCEDURE — 3079F DIAST BP 80-89 MM HG: CPT | Performed by: INTERNAL MEDICINE

## 2023-12-05 PROCEDURE — 99214 OFFICE O/P EST MOD 30 MIN: CPT | Performed by: INTERNAL MEDICINE

## 2023-12-05 PROCEDURE — 93005 ELECTROCARDIOGRAM TRACING: CPT | Performed by: INTERNAL MEDICINE

## 2023-12-05 PROCEDURE — 93010 ELECTROCARDIOGRAM REPORT: CPT | Performed by: INTERNAL MEDICINE

## 2023-12-05 PROCEDURE — G8484 FLU IMMUNIZE NO ADMIN: HCPCS | Performed by: INTERNAL MEDICINE

## 2023-12-05 PROCEDURE — 3075F SYST BP GE 130 - 139MM HG: CPT | Performed by: INTERNAL MEDICINE

## 2023-12-05 PROCEDURE — 1090F PRES/ABSN URINE INCON ASSESS: CPT | Performed by: INTERNAL MEDICINE

## 2023-12-05 PROCEDURE — 1123F ACP DISCUSS/DSCN MKR DOCD: CPT | Performed by: INTERNAL MEDICINE

## 2023-12-05 PROCEDURE — 3017F COLORECTAL CA SCREEN DOC REV: CPT | Performed by: INTERNAL MEDICINE

## 2023-12-05 PROCEDURE — 1036F TOBACCO NON-USER: CPT | Performed by: INTERNAL MEDICINE

## 2023-12-05 RX ORDER — VITAMIN B COMPLEX
1 CAPSULE ORAL DAILY
COMMUNITY

## 2023-12-05 NOTE — PROGRESS NOTES
Maurice Avila (:  1950) is a 68 y.o. female,Established patient, here for evaluation of the following chief complaint(s):  Dizziness (Patient complains of some lightheadedness, fatigue and tingling in her legs. )         ASSESSMENT/PLAN:  1. Palpitations  -     AMB POC EKG ROUTINEnormal EKG and excellent bp  Sees dr Jose Albarado later this month  2. Other fatigue  -     CBC with Auto Differential; Future  -     TSH; Future  -     Vitamin B12; Future  -     T4, Free; Future  3. HTN, goal below 130/80  4. Neuropathy  -     TSH; Future  -     Hemoglobin A1C; Future  -     Vitamin B12; Future  -     T4, Free; Future  -     EMG TWO EXTREMITIES LOWER; Future  5. Vitamin deficiency  -     Vitamin D 25 Hydroxy; Future      No follow-ups on file. Subjective   SUBJECTIVE/OBJECTIVE:  Dizziness      Seen today for sick visit. For a few days she has felt fatigued a little more dizzy not sure if it is partly because her vision is not as good with known cataracts. Has not had chest pain shortness of breath or diaphoresis notes occasional palpitations. Blood pressure at home below has been 108 to the high has been in the 130s over 60-70. Notes also that her longstanding bilateral lower extremity neuropathy seems to have progressed a bit and is now higher up to her shins. Legs feel heavy. Not associated with walking. No weakness. No back pain. No fevers or chills. No significant hand symptoms. Also notes that she is feeling a bit more anxious than usual and wonders if she is more focused on physical symptoms currently. No recent bleeding no GI symptoms  Review of Systems   Neurological:  Positive for dizziness. Objective   Physical Exam  Constitutional:       Appearance: Normal appearance. HENT:      Head: Normocephalic and atraumatic. Cardiovascular:      Rate and Rhythm: Normal rate and regular rhythm.    Pulmonary:      Effort: Pulmonary effort is normal.      Breath sounds: Normal

## 2023-12-06 LAB
25(OH)D3 SERPL-MCNC: 53.9 NG/ML (ref 30–100)
BASOPHILS # BLD: 0.1 K/UL (ref 0–0.1)
BASOPHILS NFR BLD: 1 % (ref 0–1)
COMMENT:: NORMAL
DIFFERENTIAL METHOD BLD: ABNORMAL
EOSINOPHIL # BLD: 0.1 K/UL (ref 0–0.4)
EOSINOPHIL NFR BLD: 1 % (ref 0–7)
ERYTHROCYTE [DISTWIDTH] IN BLOOD BY AUTOMATED COUNT: 12.4 % (ref 11.5–14.5)
EST. AVERAGE GLUCOSE BLD GHB EST-MCNC: 94 MG/DL
HBA1C MFR BLD: 4.9 % (ref 4–5.6)
HCT VFR BLD AUTO: 37.8 % (ref 35–47)
HGB BLD-MCNC: 12.6 G/DL (ref 11.5–16)
IMM GRANULOCYTES # BLD AUTO: 0 K/UL (ref 0–0.04)
IMM GRANULOCYTES NFR BLD AUTO: 0 % (ref 0–0.5)
LYMPHOCYTES # BLD: 2.6 K/UL (ref 0.8–3.5)
LYMPHOCYTES NFR BLD: 31 % (ref 12–49)
MCH RBC QN AUTO: 33.5 PG (ref 26–34)
MCHC RBC AUTO-ENTMCNC: 33.3 G/DL (ref 30–36.5)
MCV RBC AUTO: 100.5 FL (ref 80–99)
MONOCYTES # BLD: 0.8 K/UL (ref 0–1)
MONOCYTES NFR BLD: 9 % (ref 5–13)
NEUTS SEG # BLD: 5 K/UL (ref 1.8–8)
NEUTS SEG NFR BLD: 58 % (ref 32–75)
NRBC # BLD: 0 K/UL (ref 0–0.01)
NRBC BLD-RTO: 0 PER 100 WBC
PLATELET # BLD AUTO: 209 K/UL (ref 150–400)
PMV BLD AUTO: 10.4 FL (ref 8.9–12.9)
RBC # BLD AUTO: 3.76 M/UL (ref 3.8–5.2)
SPECIMEN HOLD: NORMAL
T4 FREE SERPL-MCNC: 0.9 NG/DL (ref 0.8–1.5)
TSH SERPL DL<=0.05 MIU/L-ACNC: 1.14 UIU/ML (ref 0.36–3.74)
VIT B12 SERPL-MCNC: 1420 PG/ML (ref 193–986)
WBC # BLD AUTO: 8.6 K/UL (ref 3.6–11)

## 2023-12-08 ENCOUNTER — TELEPHONE (OUTPATIENT)
Age: 73
End: 2023-12-08

## 2023-12-08 NOTE — TELEPHONE ENCOUNTER
The patient is calling to inform the doctor that she received her Flu shot ( Fluad quad [id number] 10/02/23 ). The COVID Moderna vaccine on 10/10/23 She will like for the information to be documented in her chart. [phone]

## 2023-12-16 DIAGNOSIS — I10 ESSENTIAL (PRIMARY) HYPERTENSION: ICD-10-CM

## 2023-12-16 DIAGNOSIS — E78.49 OTHER HYPERLIPIDEMIA: ICD-10-CM

## 2023-12-17 RX ORDER — LOVASTATIN 20 MG/1
TABLET ORAL NIGHTLY
Qty: 90 TABLET | Refills: 1 | Status: SHIPPED | OUTPATIENT
Start: 2023-12-17

## 2023-12-17 RX ORDER — APIXABAN 5 MG/1
5 TABLET, FILM COATED ORAL 2 TIMES DAILY
Qty: 180 TABLET | Refills: 3 | Status: SHIPPED | OUTPATIENT
Start: 2023-12-17

## 2023-12-17 RX ORDER — ATENOLOL 50 MG/1
TABLET ORAL
Qty: 180 TABLET | Refills: 1 | Status: SHIPPED | OUTPATIENT
Start: 2023-12-17

## 2023-12-21 PROBLEM — D68.69 SECONDARY HYPERCOAGULABLE STATE (HCC): Status: ACTIVE | Noted: 2023-12-21

## 2024-01-03 ENCOUNTER — APPOINTMENT (OUTPATIENT)
Facility: HOSPITAL | Age: 74
DRG: 202 | End: 2024-01-03
Payer: MEDICARE

## 2024-01-03 ENCOUNTER — APPOINTMENT (OUTPATIENT)
Facility: HOSPITAL | Age: 74
DRG: 202 | End: 2024-01-03
Attending: EMERGENCY MEDICINE
Payer: MEDICARE

## 2024-01-03 ENCOUNTER — HOSPITAL ENCOUNTER (INPATIENT)
Facility: HOSPITAL | Age: 74
LOS: 6 days | Discharge: HOME HEALTH | DRG: 202 | End: 2024-01-09
Attending: EMERGENCY MEDICINE | Admitting: INTERNAL MEDICINE
Payer: MEDICARE

## 2024-01-03 DIAGNOSIS — I10 ESSENTIAL (PRIMARY) HYPERTENSION: ICD-10-CM

## 2024-01-03 DIAGNOSIS — J96.01 ACUTE RESPIRATORY FAILURE WITH HYPOXIA (HCC): ICD-10-CM

## 2024-01-03 DIAGNOSIS — E87.1 HYPONATREMIA: Primary | ICD-10-CM

## 2024-01-03 DIAGNOSIS — I34.0 MITRAL REGURGITATION: ICD-10-CM

## 2024-01-03 PROBLEM — R09.02 HYPOXIA: Status: ACTIVE | Noted: 2024-01-03

## 2024-01-03 LAB
ALBUMIN SERPL-MCNC: 3.1 G/DL (ref 3.5–5)
ALBUMIN/GLOB SERPL: 0.9 (ref 1.1–2.2)
ALP SERPL-CCNC: 53 U/L (ref 45–117)
ALT SERPL-CCNC: 39 U/L (ref 12–78)
ANION GAP SERPL CALC-SCNC: 6 MMOL/L (ref 5–15)
ANION GAP SERPL CALC-SCNC: 9 MMOL/L (ref 5–15)
ANION GAP SERPL CALC-SCNC: 9 MMOL/L (ref 5–15)
APPEARANCE UR: CLEAR
AST SERPL-CCNC: 64 U/L (ref 15–37)
BACTERIA URNS QL MICRO: NEGATIVE /HPF
BASOPHILS # BLD: 0.1 K/UL (ref 0–0.1)
BASOPHILS NFR BLD: 0 % (ref 0–1)
BILIRUB SERPL-MCNC: 0.6 MG/DL (ref 0.2–1)
BILIRUB UR QL: NEGATIVE
BUN SERPL-MCNC: 4 MG/DL (ref 6–20)
BUN/CREAT SERPL: 10 (ref 12–20)
BUN/CREAT SERPL: 10 (ref 12–20)
BUN/CREAT SERPL: 9 (ref 12–20)
CALCIUM SERPL-MCNC: 7.2 MG/DL (ref 8.5–10.1)
CALCIUM SERPL-MCNC: 8.1 MG/DL (ref 8.5–10.1)
CALCIUM SERPL-MCNC: 8.2 MG/DL (ref 8.5–10.1)
CHLORIDE SERPL-SCNC: 74 MMOL/L (ref 97–108)
CHLORIDE SERPL-SCNC: 75 MMOL/L (ref 97–108)
CHLORIDE SERPL-SCNC: 85 MMOL/L (ref 97–108)
CO2 SERPL-SCNC: 28 MMOL/L (ref 21–32)
CO2 SERPL-SCNC: 31 MMOL/L (ref 21–32)
CO2 SERPL-SCNC: 31 MMOL/L (ref 21–32)
COLOR UR: ABNORMAL
CREAT SERPL-MCNC: 0.41 MG/DL (ref 0.55–1.02)
CREAT SERPL-MCNC: 0.41 MG/DL (ref 0.55–1.02)
CREAT SERPL-MCNC: 0.47 MG/DL (ref 0.55–1.02)
DIFFERENTIAL METHOD BLD: ABNORMAL
ECHO AO ASC DIAM: 3.8 CM
ECHO AO ASCENDING AORTA INDEX: 2.42 CM/M2
ECHO AO ROOT DIAM: 3.7 CM
ECHO AO ROOT INDEX: 2.36 CM/M2
ECHO AR MAX VEL PISA: 3.5 M/S
ECHO AV PEAK GRADIENT: 7 MMHG
ECHO AV PEAK VELOCITY: 1.4 M/S
ECHO AV REGURGITANT PHT: 442.8 MILLISECOND
ECHO BSA: 1.58 M2
ECHO EST RA PRESSURE: 3 MMHG
ECHO LA DIAMETER INDEX: 2.23 CM/M2
ECHO LA DIAMETER: 3.5 CM
ECHO LA TO AORTIC ROOT RATIO: 0.95
ECHO LA VOL MOD A4C: 73 ML (ref 22–52)
ECHO LA VOLUME INDEX MOD A4C: 46 ML/M2 (ref 16–34)
ECHO LV FRACTIONAL SHORTENING: 34 % (ref 28–44)
ECHO LV INTERNAL DIMENSION DIASTOLE INDEX: 2.8 CM/M2
ECHO LV INTERNAL DIMENSION DIASTOLIC: 4.4 CM (ref 3.9–5.3)
ECHO LV INTERNAL DIMENSION SYSTOLIC INDEX: 1.85 CM/M2
ECHO LV INTERNAL DIMENSION SYSTOLIC: 2.9 CM
ECHO LV IVSD: 1 CM (ref 0.6–0.9)
ECHO LV MASS 2D: 147.8 G (ref 67–162)
ECHO LV MASS INDEX 2D: 94.2 G/M2 (ref 43–95)
ECHO LV POSTERIOR WALL DIASTOLIC: 1 CM (ref 0.6–0.9)
ECHO LV RELATIVE WALL THICKNESS RATIO: 0.45
ECHO LVOT AREA: 2.8 CM2
ECHO LVOT DIAM: 1.9 CM
ECHO MV A VELOCITY: 0.91 M/S
ECHO MV E DECELERATION TIME (DT): 175.7 MS
ECHO MV E VELOCITY: 1.05 M/S
ECHO MV E/A RATIO: 1.15
ECHO PV MAX VELOCITY: 0.8 M/S
ECHO PV PEAK GRADIENT: 3 MMHG
ECHO RA AREA 4C: 24 CM2
ECHO RIGHT VENTRICULAR SYSTOLIC PRESSURE (RVSP): 31 MMHG
ECHO RV FREE WALL PEAK S': 21 CM/S
ECHO RV TAPSE: 1.8 CM (ref 1.7–?)
ECHO TV REGURGITANT MAX VELOCITY: 2.65 M/S
ECHO TV REGURGITANT PEAK GRADIENT: 28 MMHG
EOSINOPHIL # BLD: 0 K/UL (ref 0–0.4)
EOSINOPHIL NFR BLD: 0 % (ref 0–7)
EPITH CASTS URNS QL MICRO: ABNORMAL /LPF
ERYTHROCYTE [DISTWIDTH] IN BLOOD BY AUTOMATED COUNT: 11.4 % (ref 11.5–14.5)
FLUAV AG NPH QL IA: NEGATIVE
FLUBV AG NOSE QL IA: NEGATIVE
GLOBULIN SER CALC-MCNC: 3.5 G/DL (ref 2–4)
GLUCOSE SERPL-MCNC: 111 MG/DL (ref 65–100)
GLUCOSE SERPL-MCNC: 114 MG/DL (ref 65–100)
GLUCOSE SERPL-MCNC: 116 MG/DL (ref 65–100)
GLUCOSE UR STRIP.AUTO-MCNC: NEGATIVE MG/DL
HCT VFR BLD AUTO: 30.3 % (ref 35–47)
HGB BLD-MCNC: 11.1 G/DL (ref 11.5–16)
HGB UR QL STRIP: NEGATIVE
HYALINE CASTS URNS QL MICRO: ABNORMAL /LPF (ref 0–2)
IMM GRANULOCYTES # BLD AUTO: 0.1 K/UL (ref 0–0.04)
IMM GRANULOCYTES NFR BLD AUTO: 1 % (ref 0–0.5)
KETONES UR QL STRIP.AUTO: 40 MG/DL
LEUKOCYTE ESTERASE UR QL STRIP.AUTO: ABNORMAL
LYMPHOCYTES # BLD: 1.5 K/UL (ref 0.8–3.5)
LYMPHOCYTES NFR BLD: 14 % (ref 12–49)
MAGNESIUM SERPL-MCNC: 1.1 MG/DL (ref 1.6–2.4)
MAGNESIUM SERPL-MCNC: 1.5 MG/DL (ref 1.6–2.4)
MCH RBC QN AUTO: 33.8 PG (ref 26–34)
MCHC RBC AUTO-ENTMCNC: 36.6 G/DL (ref 30–36.5)
MCV RBC AUTO: 92.4 FL (ref 80–99)
MONOCYTES # BLD: 1.2 K/UL (ref 0–1)
MONOCYTES NFR BLD: 11 % (ref 5–13)
NEUTS SEG # BLD: 8.2 K/UL (ref 1.8–8)
NEUTS SEG NFR BLD: 74 % (ref 32–75)
NITRITE UR QL STRIP.AUTO: NEGATIVE
NRBC # BLD: 0 K/UL (ref 0–0.01)
NRBC BLD-RTO: 0 PER 100 WBC
NT PRO BNP: 2029 PG/ML
OSMOLALITY SERPL: 239 MOSM/KG H2O
PH UR STRIP: 6 (ref 5–8)
PLATELET # BLD AUTO: 225 K/UL (ref 150–400)
PMV BLD AUTO: 10.5 FL (ref 8.9–12.9)
POTASSIUM SERPL-SCNC: 2.6 MMOL/L (ref 3.5–5.1)
POTASSIUM SERPL-SCNC: 3 MMOL/L (ref 3.5–5.1)
POTASSIUM SERPL-SCNC: 4.1 MMOL/L (ref 3.5–5.1)
POTASSIUM UR-SCNC: 41 MMOL/L
PROCALCITONIN SERPL-MCNC: 0.11 NG/ML
PROT SERPL-MCNC: 6.6 G/DL (ref 6.4–8.2)
PROT UR STRIP-MCNC: NEGATIVE MG/DL
RBC # BLD AUTO: 3.28 M/UL (ref 3.8–5.2)
RBC #/AREA URNS HPF: ABNORMAL /HPF (ref 0–5)
SARS-COV-2 RDRP RESP QL NAA+PROBE: NOT DETECTED
SODIUM SERPL-SCNC: 114 MMOL/L (ref 136–145)
SODIUM SERPL-SCNC: 115 MMOL/L (ref 136–145)
SODIUM SERPL-SCNC: 119 MMOL/L (ref 136–145)
SODIUM UR-SCNC: 41 MMOL/L
SOURCE: NORMAL
SP GR UR REFRACTOMETRY: 1.01
TROPONIN I SERPL HS-MCNC: 16 NG/L (ref 0–51)
TROPONIN I SERPL HS-MCNC: 16 NG/L (ref 0–51)
URINE CULTURE IF INDICATED: ABNORMAL
UROBILINOGEN UR QL STRIP.AUTO: 0.2 EU/DL (ref 0.2–1)
WBC # BLD AUTO: 11.1 K/UL (ref 3.6–11)
WBC URNS QL MICRO: ABNORMAL /HPF (ref 0–4)

## 2024-01-03 PROCEDURE — C8929 TTE W OR WO FOL WCON,DOPPLER: HCPCS

## 2024-01-03 PROCEDURE — 2580000003 HC RX 258: Performed by: INTERNAL MEDICINE

## 2024-01-03 PROCEDURE — 80053 COMPREHEN METABOLIC PANEL: CPT

## 2024-01-03 PROCEDURE — 87635 SARS-COV-2 COVID-19 AMP PRB: CPT

## 2024-01-03 PROCEDURE — 94640 AIRWAY INHALATION TREATMENT: CPT

## 2024-01-03 PROCEDURE — 96361 HYDRATE IV INFUSION ADD-ON: CPT

## 2024-01-03 PROCEDURE — 83930 ASSAY OF BLOOD OSMOLALITY: CPT

## 2024-01-03 PROCEDURE — 2500000003 HC RX 250 WO HCPCS: Performed by: INTERNAL MEDICINE

## 2024-01-03 PROCEDURE — 93005 ELECTROCARDIOGRAM TRACING: CPT | Performed by: EMERGENCY MEDICINE

## 2024-01-03 PROCEDURE — 83935 ASSAY OF URINE OSMOLALITY: CPT

## 2024-01-03 PROCEDURE — 1100000003 HC PRIVATE W/ TELEMETRY

## 2024-01-03 PROCEDURE — 71045 X-RAY EXAM CHEST 1 VIEW: CPT

## 2024-01-03 PROCEDURE — 36415 COLL VENOUS BLD VENIPUNCTURE: CPT

## 2024-01-03 PROCEDURE — 6360000002 HC RX W HCPCS: Performed by: EMERGENCY MEDICINE

## 2024-01-03 PROCEDURE — 71250 CT THORAX DX C-: CPT

## 2024-01-03 PROCEDURE — 84133 ASSAY OF URINE POTASSIUM: CPT

## 2024-01-03 PROCEDURE — 2580000003 HC RX 258: Performed by: EMERGENCY MEDICINE

## 2024-01-03 PROCEDURE — 6370000000 HC RX 637 (ALT 250 FOR IP): Performed by: INTERNAL MEDICINE

## 2024-01-03 PROCEDURE — 81001 URINALYSIS AUTO W/SCOPE: CPT

## 2024-01-03 PROCEDURE — 84145 PROCALCITONIN (PCT): CPT

## 2024-01-03 PROCEDURE — 99285 EMERGENCY DEPT VISIT HI MDM: CPT

## 2024-01-03 PROCEDURE — 83880 ASSAY OF NATRIURETIC PEPTIDE: CPT

## 2024-01-03 PROCEDURE — 87077 CULTURE AEROBIC IDENTIFY: CPT

## 2024-01-03 PROCEDURE — 87070 CULTURE OTHR SPECIMN AEROBIC: CPT

## 2024-01-03 PROCEDURE — 6360000002 HC RX W HCPCS: Performed by: INTERNAL MEDICINE

## 2024-01-03 PROCEDURE — 99497 ADVNCD CARE PLAN 30 MIN: CPT | Performed by: NURSE PRACTITIONER

## 2024-01-03 PROCEDURE — 87185 SC STD ENZYME DETCJ PER NZM: CPT

## 2024-01-03 PROCEDURE — 84300 ASSAY OF URINE SODIUM: CPT

## 2024-01-03 PROCEDURE — 80048 BASIC METABOLIC PNL TOTAL CA: CPT

## 2024-01-03 PROCEDURE — 87804 INFLUENZA ASSAY W/OPTIC: CPT

## 2024-01-03 PROCEDURE — 83735 ASSAY OF MAGNESIUM: CPT

## 2024-01-03 PROCEDURE — 87449 NOS EACH ORGANISM AG IA: CPT

## 2024-01-03 PROCEDURE — 96374 THER/PROPH/DIAG INJ IV PUSH: CPT

## 2024-01-03 PROCEDURE — 85025 COMPLETE CBC W/AUTO DIFF WBC: CPT

## 2024-01-03 PROCEDURE — 84484 ASSAY OF TROPONIN QUANT: CPT

## 2024-01-03 PROCEDURE — 87205 SMEAR GRAM STAIN: CPT

## 2024-01-03 PROCEDURE — 96372 THER/PROPH/DIAG INJ SC/IM: CPT

## 2024-01-03 PROCEDURE — 6360000004 HC RX CONTRAST MEDICATION: Performed by: EMERGENCY MEDICINE

## 2024-01-03 RX ORDER — ONDANSETRON 2 MG/ML
4 INJECTION INTRAMUSCULAR; INTRAVENOUS ONCE
Status: COMPLETED | OUTPATIENT
Start: 2024-01-03 | End: 2024-01-03

## 2024-01-03 RX ORDER — ONDANSETRON 4 MG/1
4 TABLET, ORALLY DISINTEGRATING ORAL EVERY 8 HOURS PRN
Status: DISCONTINUED | OUTPATIENT
Start: 2024-01-03 | End: 2024-01-09 | Stop reason: HOSPADM

## 2024-01-03 RX ORDER — SODIUM CHLORIDE 9 MG/ML
INJECTION, SOLUTION INTRAVENOUS PRN
Status: DISCONTINUED | OUTPATIENT
Start: 2024-01-03 | End: 2024-01-09 | Stop reason: HOSPADM

## 2024-01-03 RX ORDER — AMLODIPINE BESYLATE 5 MG/1
5 TABLET ORAL DAILY
Status: DISCONTINUED | OUTPATIENT
Start: 2024-01-03 | End: 2024-01-09 | Stop reason: HOSPADM

## 2024-01-03 RX ORDER — MAGNESIUM SULFATE IN WATER 40 MG/ML
2000 INJECTION, SOLUTION INTRAVENOUS ONCE
Status: COMPLETED | OUTPATIENT
Start: 2024-01-03 | End: 2024-01-03

## 2024-01-03 RX ORDER — SODIUM CHLORIDE 0.9 % (FLUSH) 0.9 %
5-40 SYRINGE (ML) INJECTION EVERY 12 HOURS SCHEDULED
Status: DISCONTINUED | OUTPATIENT
Start: 2024-01-03 | End: 2024-01-09 | Stop reason: HOSPADM

## 2024-01-03 RX ORDER — SODIUM CHLORIDE 0.9 % (FLUSH) 0.9 %
5-40 SYRINGE (ML) INJECTION PRN
Status: DISCONTINUED | OUTPATIENT
Start: 2024-01-03 | End: 2024-01-09 | Stop reason: HOSPADM

## 2024-01-03 RX ORDER — ATORVASTATIN CALCIUM 20 MG/1
20 TABLET, FILM COATED ORAL NIGHTLY
Status: DISCONTINUED | OUTPATIENT
Start: 2024-01-03 | End: 2024-01-09 | Stop reason: HOSPADM

## 2024-01-03 RX ORDER — ENOXAPARIN SODIUM 100 MG/ML
40 INJECTION SUBCUTANEOUS DAILY
Status: DISCONTINUED | OUTPATIENT
Start: 2024-01-03 | End: 2024-01-03

## 2024-01-03 RX ORDER — GUAIFENESIN 600 MG/1
600 TABLET, EXTENDED RELEASE ORAL 2 TIMES DAILY
Status: DISCONTINUED | OUTPATIENT
Start: 2024-01-03 | End: 2024-01-09 | Stop reason: HOSPADM

## 2024-01-03 RX ORDER — HYDRALAZINE HYDROCHLORIDE 20 MG/ML
10 INJECTION INTRAMUSCULAR; INTRAVENOUS EVERY 6 HOURS PRN
Status: DISCONTINUED | OUTPATIENT
Start: 2024-01-03 | End: 2024-01-09 | Stop reason: HOSPADM

## 2024-01-03 RX ORDER — POTASSIUM CHLORIDE 750 MG/1
40 TABLET, FILM COATED, EXTENDED RELEASE ORAL ONCE
Status: COMPLETED | OUTPATIENT
Start: 2024-01-03 | End: 2024-01-03

## 2024-01-03 RX ORDER — POLYETHYLENE GLYCOL 3350 17 G/17G
17 POWDER, FOR SOLUTION ORAL DAILY PRN
Status: DISCONTINUED | OUTPATIENT
Start: 2024-01-03 | End: 2024-01-09 | Stop reason: HOSPADM

## 2024-01-03 RX ORDER — ATENOLOL 25 MG/1
50 TABLET ORAL 2 TIMES DAILY
Status: DISCONTINUED | OUTPATIENT
Start: 2024-01-03 | End: 2024-01-05

## 2024-01-03 RX ORDER — IPRATROPIUM BROMIDE AND ALBUTEROL SULFATE 2.5; .5 MG/3ML; MG/3ML
1 SOLUTION RESPIRATORY (INHALATION) EVERY 4 HOURS PRN
Status: DISCONTINUED | OUTPATIENT
Start: 2024-01-03 | End: 2024-01-09 | Stop reason: HOSPADM

## 2024-01-03 RX ORDER — SODIUM CHLORIDE, SODIUM LACTATE, POTASSIUM CHLORIDE, AND CALCIUM CHLORIDE .6; .31; .03; .02 G/100ML; G/100ML; G/100ML; G/100ML
500 INJECTION, SOLUTION INTRAVENOUS ONCE
Status: COMPLETED | OUTPATIENT
Start: 2024-01-03 | End: 2024-01-03

## 2024-01-03 RX ORDER — ONDANSETRON 2 MG/ML
4 INJECTION INTRAMUSCULAR; INTRAVENOUS EVERY 6 HOURS PRN
Status: DISCONTINUED | OUTPATIENT
Start: 2024-01-03 | End: 2024-01-09 | Stop reason: HOSPADM

## 2024-01-03 RX ORDER — ACETAMINOPHEN 325 MG/1
650 TABLET ORAL EVERY 6 HOURS PRN
Status: DISCONTINUED | OUTPATIENT
Start: 2024-01-03 | End: 2024-01-09 | Stop reason: HOSPADM

## 2024-01-03 RX ORDER — MAGNESIUM SULFATE 1 G/100ML
1000 INJECTION INTRAVENOUS ONCE
Status: COMPLETED | OUTPATIENT
Start: 2024-01-03 | End: 2024-01-03

## 2024-01-03 RX ORDER — POTASSIUM CHLORIDE 7.45 MG/ML
10 INJECTION INTRAVENOUS
Status: COMPLETED | OUTPATIENT
Start: 2024-01-03 | End: 2024-01-03

## 2024-01-03 RX ORDER — ACETAMINOPHEN 650 MG/1
650 SUPPOSITORY RECTAL EVERY 6 HOURS PRN
Status: DISCONTINUED | OUTPATIENT
Start: 2024-01-03 | End: 2024-01-09 | Stop reason: HOSPADM

## 2024-01-03 RX ADMIN — DOXYCYCLINE 100 MG: 100 INJECTION, POWDER, LYOPHILIZED, FOR SOLUTION INTRAVENOUS at 12:37

## 2024-01-03 RX ADMIN — ONDANSETRON 4 MG: 2 INJECTION INTRAMUSCULAR; INTRAVENOUS at 07:44

## 2024-01-03 RX ADMIN — POTASSIUM CHLORIDE 10 MEQ: 7.46 INJECTION, SOLUTION INTRAVENOUS at 14:16

## 2024-01-03 RX ADMIN — POTASSIUM CHLORIDE 40 MEQ: 750 TABLET, FILM COATED, EXTENDED RELEASE ORAL at 12:33

## 2024-01-03 RX ADMIN — POTASSIUM CHLORIDE 10 MEQ: 7.46 INJECTION, SOLUTION INTRAVENOUS at 14:15

## 2024-01-03 RX ADMIN — IPRATROPIUM BROMIDE AND ALBUTEROL SULFATE 1 DOSE: .5; 3 SOLUTION RESPIRATORY (INHALATION) at 17:26

## 2024-01-03 RX ADMIN — ENOXAPARIN SODIUM 40 MG: 40 INJECTION SUBCUTANEOUS at 10:26

## 2024-01-03 RX ADMIN — SODIUM CHLORIDE: 9 INJECTION, SOLUTION INTRAVENOUS at 23:54

## 2024-01-03 RX ADMIN — GUAIFENESIN 600 MG: 600 TABLET, EXTENDED RELEASE ORAL at 21:13

## 2024-01-03 RX ADMIN — GUAIFENESIN 600 MG: 600 TABLET, EXTENDED RELEASE ORAL at 14:14

## 2024-01-03 RX ADMIN — POTASSIUM CHLORIDE 10 MEQ: 7.46 INJECTION, SOLUTION INTRAVENOUS at 17:12

## 2024-01-03 RX ADMIN — MAGNESIUM SULFATE HEPTAHYDRATE 1000 MG: 1 INJECTION, SOLUTION INTRAVENOUS at 20:14

## 2024-01-03 RX ADMIN — ATENOLOL 50 MG: 50 TABLET ORAL at 14:14

## 2024-01-03 RX ADMIN — PERFLUTREN 1.5 ML: 6.52 INJECTION, SUSPENSION INTRAVENOUS at 10:40

## 2024-01-03 RX ADMIN — MAGNESIUM SULFATE HEPTAHYDRATE 2000 MG: 40 INJECTION, SOLUTION INTRAVENOUS at 12:40

## 2024-01-03 RX ADMIN — ATORVASTATIN CALCIUM 20 MG: 20 TABLET, FILM COATED ORAL at 21:13

## 2024-01-03 RX ADMIN — DOXYCYCLINE 100 MG: 100 INJECTION, POWDER, LYOPHILIZED, FOR SOLUTION INTRAVENOUS at 23:55

## 2024-01-03 RX ADMIN — APIXABAN 5 MG: 5 TABLET, FILM COATED ORAL at 14:14

## 2024-01-03 RX ADMIN — AMLODIPINE BESYLATE 5 MG: 5 TABLET ORAL at 14:13

## 2024-01-03 RX ADMIN — SODIUM CHLORIDE, PRESERVATIVE FREE 10 ML: 5 INJECTION INTRAVENOUS at 21:13

## 2024-01-03 RX ADMIN — APIXABAN 5 MG: 5 TABLET, FILM COATED ORAL at 21:13

## 2024-01-03 RX ADMIN — SODIUM CHLORIDE, POTASSIUM CHLORIDE, SODIUM LACTATE AND CALCIUM CHLORIDE 500 ML: 600; 310; 30; 20 INJECTION, SOLUTION INTRAVENOUS at 07:43

## 2024-01-03 ASSESSMENT — PAIN SCALES - GENERAL
PAINLEVEL_OUTOF10: 0
PAINLEVEL_OUTOF10: 0

## 2024-01-03 ASSESSMENT — PAIN - FUNCTIONAL ASSESSMENT: PAIN_FUNCTIONAL_ASSESSMENT: NONE - DENIES PAIN

## 2024-01-03 NOTE — H&P
Hospitalist Admission Note    NAME:   Alicia Torres   : 1950   MRN: [id number]     Date/Time: 1/3/2024 12:00 PM    Patient PCP: Karla Canales MD    ______________________________________________________________________  Given the patient's current clinical presentation, I have a high level of concern for decompensation if discharged from the emergency department.  Complex decision making was performed, which includes reviewing the patient's available past medical records, laboratory results, and x-ray films.       My assessment of this patient's clinical condition and my plan of care is as follows.    Assessment / Plan:  Acute hypoxic respiratory failure due to acute bronchitis  CT chest: clustered nodularity and tree-in-bud opacities as described in the right lung  in the right upper lobe and right middle lobe. These findings are most consistent with bronchitis/bronchiolitis.  Additionally there is some  inhomogeneity density of lung parenchyma which is nonspecific but could be  related to air trapping.  Procal 0.11  Echo pending  COVID 19 and influenza A/B negative  F/u with sputum cx  Will add mucinex and empiric IV doxycycline  Wean O2 as tolerated    Hypomagnesemia  Hypokalemia  Replace with KCL and IV mag  Repeat labs in the AM    Hyponatremia  Appears dry on exam despite elevated probnp at 2K  ? SIADH  S/p IVF in the ER  Hold HCTZ   Send urine lytes, plasma and urine osm  Nephrology is consulted    Incidental finding of abnormality infeioriorly in the LL of the liver  Chronic mild elevation of AST 64  Will need 3 phase CT of liver for further evaluation when pt is improve from acute illness.  Can have this done with PCP outpt.     Hx of PAF  Hx of MR  HTN  Resume home medications except Benicar-HCTZ      Updated pt's daughter in law          Medical Decision Making:  I personally reviewed labs  I personally reviewed imaging  Toxic drug monitoring  I personally reviewed EKG  Discussed  CHEST WO CONTRAST    Result Date: 1/3/2024  INDICATION: Cough and shortness of breath, hyponatremia COMPARISON: Chest radiograph 1/3/2024 and 11/8/2018 CONTRAST: None. TECHNIQUE: 2.5 and 5 mm axial images were obtained through the chest. Coronal and sagittal reformats were generated.  CT dose reduction was achieved through use of a standardized protocol tailored for this examination and automatic exposure control for dose modulation. The absence of intravenous contrast reduces the sensitivity for evaluation of the mediastinum, beata, vasculature, and upper abdominal organs. FINDINGS: CHEST WALL: No mass or axillary lymphadenopathy. THYROID: No nodule. MEDIASTINUM: No mass or lymphadenopathy. BEATA: No mass or lymphadenopathy. THORACIC AORTA: No aneurysm. MAIN PULMONARY ARTERY: Normal in caliber. TRACHEA/BRONCHI: Patent. ESOPHAGUS: No wall thickening or dilatation. HEART: Normal in size. Coronary artery calcium: absent PLEURA: No effusion or pneumothorax. LUNGS: The lungs are deeply aerated. In the right upper lobe especially posteriorly there is evidence for bronchial wall thickening and minimal tree in bud opacities suggesting bronchitis/bronchiolitis. There are clustered nodules and tree-in-bud opacities in right lower lobe consistent with bronchitis/bronchiolitis. There is also mucus and debris within bronchi anteriorly in the right lower lobe. Linear areas of scarring are noted bilaterally in the right middle lobe, medial right middle lobe and laterally in the right lower lobe and medially in left lower lobe. Central airways are patent.. There is inhomogeneity of the density of lung parenchyma this is nonspecific but could be related to air trapping. There appear to be mild changes of centrilobular emphysema. There is a calcified granuloma in the left upper lobe with associated linear area of scarring. INCIDENTALLY IMAGED UPPER ABDOMEN: There is an abnormality inferiorly in the left lobe of the liver this measures

## 2024-01-03 NOTE — ACP (ADVANCE CARE PLANNING)
Palliative Medicine  Advanced Care Planning  [phone]    Met with Ms Torres- she wanted to make sure she had all her ACP documents in order  She has a living will on file, but no mPOA or DDNR so we completed those together today      Primary Decision Maker: Henrique Torres - Child - [phone]    Secondary Decision Maker: Ashlee Gifford - Daughter-in-Law - [phone]    DDNR and AMD put on her chart for scanning and originals given to her DIL Ashlee per her request    Anastasiya Richard, APRN - NP    I spent 16 min of voluntary ACP time with Ms Torres

## 2024-01-03 NOTE — ED PROVIDER NOTES
[facility] EMERGENCY DEPT  EMERGENCY DEPARTMENT ENCOUNTER       Pt Name: Alicia Torres  MRN: [id number]  Birthdate 1950  Date of evaluation: 1/3/2024  Provider: Rashi Andrea MD   PCP: Karla Canales MD  Note Started: 2:23 PM 1/3/24     CHIEF COMPLAINT       Chief Complaint   Patient presents with    Low Oxygen      Pt ambulatory to triage w cc of low Oxygen at home- 87%. Pt recently had URI, no abx. Pt also reports decreased energy, decreased PO intake    Cough        HISTORY OF PRESENT ILLNESS: 1 or more elements      History From: Patient, daughter, History limited by: No limitations     Alicia Torres is a 74 y.o. female with history of paroxysmal atrial fibrillation, mitral regurgitation, hypertension who presents with chief complaint of cough, congestion, generalized weakness, malaise, concern for low O2 sats.  Patient endorses recent viral URI symptoms over the past 1 week with mildly productive cough, generalized malaise, generalized weakness, nausea without vomiting, poor p.o. intake.  Denies fevers, chills, abdominal pain, chest pain.  This morning she felt weak and while ambulating she noted that she had low O2 sat 87% with ambulation.  This prompted her to come to the ED for evaluation.     Nursing Notes were all reviewed and agreed with or any disagreements were addressed in the HPI.     REVIEW OF SYSTEMS        Positives and Pertinent negatives as per HPI.    PAST HISTORY     Past Medical History:  Past Medical History:   Diagnosis Date    Dyslipidemia     HTN, goal below 130/80 11/9/2015    Hx of bone density study 06/08/2016    Osteopenic    Hx of mammogram 07/10/2017    Negtaive     Impaired glucose tolerance 11/9/2015    Mitral regurgitation     mild MR    Osteopenia 11/9/2015    PAF (paroxysmal atrial fibrillation) ([facility])     Routine Papanicolaou smear 07/24/2018    Neg pap        Past Surgical History:  Past Surgical History:   Procedure Laterality Date    APPENDECTOMY  1963

## 2024-01-03 NOTE — CONSULTS
Consultation Note    NAME: Alicia Torres   :  1950   MRN:  [id number]     Date/Time:  1/3/2024 9:14 AM    I have been asked to see this patient by Dr. Andrea  for advice/opinion re: Hyponatremia.     Assessment :    Plan:  Hyponatremia, acute  Hypokalemia  HTN  Afib Sodium 114    Replace K up to > 4 (s/p LR bolus and a PO dose of K)    High BNP, hypoalbuminemia and mildly high AST; clear chest film, but rales right base and some hypoxia; TSH nml Dec '23    On thiazide for years, poor intake -- at least a component of low solute    S/p 500 ml LR bolus --> recheck BMP and Mg to assess response; check urine K, Na and osm; check serum osm    For now continue FR, f/u on echo    I suspect she will need a dose of loop, but will await repeat labs       Subjective:   CHIEF COMPLAINT:  hyponatremia    HISTORY OF PRESENT ILLNESS:     Alicia is a 74 y.o.   female who has a history of HTN and afib. Feeling poorly for about a week. A cough and cold. Poor appetite and po intake (poor food intake and has been trying to push fluids, but doesn't sound to be excessive - decreased UOP). + LH/Dz. On Olmesartan HCT for years. No DUSTIN. No rash. Cough productive of yellow.     Past Medical History:   Diagnosis Date    Dyslipidemia     HTN, goal below 130/80 2015    Hx of bone density study 2016    Osteopenic    Hx of mammogram 07/10/2017    Negtaive     Impaired glucose tolerance 2015    Mitral regurgitation     mild MR    Osteopenia 2015    PAF (paroxysmal atrial fibrillation) ([facility])     Routine Papanicolaou smear 2018    Neg pap       Past Surgical History:   Procedure Laterality Date    APPENDECTOMY  1963     SECTION      COLONOSCOPY      ORTHOPEDIC SURGERY  13    right foot     Social History     Tobacco Use    Smoking status: Former     Current packs/day: 0.00     Average packs/day: 0.8 packs/day for 12.0 years (9.0 ttl pk-yrs)     Types: Cigarettes     Start date: 2000

## 2024-01-03 NOTE — PROGRESS NOTES
Repeat BMP -    Na 114 to 115  K 3 to 2.6  Mg 1.1    Sodium will not correct until we get K up. Will give IV mag now and then some IV Kcl. Repeat BMP after Mg/K IV replacement.    Goal sodium by tomorrow is ~ 120.    Amari   Siliq Counseling:  I discussed with the patient the risks of Siliq including but not limited to new or worsening depression, suicidal thoughts and behavior, immunosuppression, malignancy, posterior leukoencephalopathy syndrome, and serious infections.  The patient understands that monitoring is required including a PPD at baseline and must alert us or the primary physician if symptoms of infection or other concerning signs are noted. There is also a special program designed to monitor depression which is required with Siliq.

## 2024-01-04 LAB
ANION GAP SERPL CALC-SCNC: 7 MMOL/L (ref 5–15)
ANION GAP SERPL CALC-SCNC: 8 MMOL/L (ref 5–15)
BASOPHILS # BLD: 0.1 K/UL (ref 0–0.1)
BASOPHILS NFR BLD: 1 % (ref 0–1)
BUN SERPL-MCNC: 4 MG/DL (ref 6–20)
BUN SERPL-MCNC: 5 MG/DL (ref 6–20)
BUN/CREAT SERPL: 10 (ref 12–20)
BUN/CREAT SERPL: 9 (ref 12–20)
CALCIUM SERPL-MCNC: 8.1 MG/DL (ref 8.5–10.1)
CALCIUM SERPL-MCNC: 8.2 MG/DL (ref 8.5–10.1)
CHLORIDE SERPL-SCNC: 81 MMOL/L (ref 97–108)
CHLORIDE SERPL-SCNC: 82 MMOL/L (ref 97–108)
CO2 SERPL-SCNC: 28 MMOL/L (ref 21–32)
CO2 SERPL-SCNC: 31 MMOL/L (ref 21–32)
CREAT SERPL-MCNC: 0.47 MG/DL (ref 0.55–1.02)
CREAT SERPL-MCNC: 0.49 MG/DL (ref 0.55–1.02)
DIFFERENTIAL METHOD BLD: ABNORMAL
EOSINOPHIL # BLD: 0 K/UL (ref 0–0.4)
EOSINOPHIL NFR BLD: 0 % (ref 0–7)
ERYTHROCYTE [DISTWIDTH] IN BLOOD BY AUTOMATED COUNT: 11.4 % (ref 11.5–14.5)
GLUCOSE SERPL-MCNC: 120 MG/DL (ref 65–100)
GLUCOSE SERPL-MCNC: 138 MG/DL (ref 65–100)
HCT VFR BLD AUTO: 28.9 % (ref 35–47)
HGB BLD-MCNC: 10.2 G/DL (ref 11.5–16)
IMM GRANULOCYTES # BLD AUTO: 0.1 K/UL (ref 0–0.04)
IMM GRANULOCYTES NFR BLD AUTO: 1 % (ref 0–0.5)
L PNEUMO1 AG UR QL IA: NEGATIVE
LYMPHOCYTES # BLD: 1.6 K/UL (ref 0.8–3.5)
LYMPHOCYTES NFR BLD: 13 % (ref 12–49)
MAGNESIUM SERPL-MCNC: 1.9 MG/DL (ref 1.6–2.4)
MCH RBC QN AUTO: 32.8 PG (ref 26–34)
MCHC RBC AUTO-ENTMCNC: 35.3 G/DL (ref 30–36.5)
MCV RBC AUTO: 92.9 FL (ref 80–99)
MONOCYTES # BLD: 1.4 K/UL (ref 0–1)
MONOCYTES NFR BLD: 11 % (ref 5–13)
NEUTS SEG # BLD: 9.3 K/UL (ref 1.8–8)
NEUTS SEG NFR BLD: 74 % (ref 32–75)
NRBC # BLD: 0 K/UL (ref 0–0.01)
NRBC BLD-RTO: 0 PER 100 WBC
OSMOLALITY UR: 407 MOSM/KG H2O
PLATELET # BLD AUTO: 175 K/UL (ref 150–400)
PMV BLD AUTO: 9.6 FL (ref 8.9–12.9)
POTASSIUM SERPL-SCNC: 3.3 MMOL/L (ref 3.5–5.1)
POTASSIUM SERPL-SCNC: 3.5 MMOL/L (ref 3.5–5.1)
RBC # BLD AUTO: 3.11 M/UL (ref 3.8–5.2)
SODIUM SERPL-SCNC: 118 MMOL/L (ref 136–145)
SODIUM SERPL-SCNC: 119 MMOL/L (ref 136–145)
SPECIMEN SOURCE: NORMAL
WBC # BLD AUTO: 12.5 K/UL (ref 3.6–11)

## 2024-01-04 PROCEDURE — 85025 COMPLETE CBC W/AUTO DIFF WBC: CPT

## 2024-01-04 PROCEDURE — 2500000003 HC RX 250 WO HCPCS: Performed by: INTERNAL MEDICINE

## 2024-01-04 PROCEDURE — 2060000000 HC ICU INTERMEDIATE R&B

## 2024-01-04 PROCEDURE — 97165 OT EVAL LOW COMPLEX 30 MIN: CPT

## 2024-01-04 PROCEDURE — 97161 PT EVAL LOW COMPLEX 20 MIN: CPT

## 2024-01-04 PROCEDURE — 6370000000 HC RX 637 (ALT 250 FOR IP): Performed by: INTERNAL MEDICINE

## 2024-01-04 PROCEDURE — 2580000003 HC RX 258: Performed by: INTERNAL MEDICINE

## 2024-01-04 PROCEDURE — 80048 BASIC METABOLIC PNL TOTAL CA: CPT

## 2024-01-04 PROCEDURE — 2700000000 HC OXYGEN THERAPY PER DAY

## 2024-01-04 PROCEDURE — 36415 COLL VENOUS BLD VENIPUNCTURE: CPT

## 2024-01-04 PROCEDURE — 83735 ASSAY OF MAGNESIUM: CPT

## 2024-01-04 PROCEDURE — 97535 SELF CARE MNGMENT TRAINING: CPT

## 2024-01-04 PROCEDURE — 6370000000 HC RX 637 (ALT 250 FOR IP): Performed by: STUDENT IN AN ORGANIZED HEALTH CARE EDUCATION/TRAINING PROGRAM

## 2024-01-04 PROCEDURE — 94760 N-INVAS EAR/PLS OXIMETRY 1: CPT

## 2024-01-04 PROCEDURE — 97116 GAIT TRAINING THERAPY: CPT

## 2024-01-04 RX ORDER — POTASSIUM CHLORIDE 750 MG/1
40 TABLET, FILM COATED, EXTENDED RELEASE ORAL ONCE
Status: COMPLETED | OUTPATIENT
Start: 2024-01-04 | End: 2024-01-04

## 2024-01-04 RX ORDER — DOXYCYCLINE HYCLATE 100 MG
100 TABLET ORAL EVERY 12 HOURS SCHEDULED
Status: DISCONTINUED | OUTPATIENT
Start: 2024-01-04 | End: 2024-01-05

## 2024-01-04 RX ORDER — FUROSEMIDE 40 MG/1
40 TABLET ORAL ONCE
Status: COMPLETED | OUTPATIENT
Start: 2024-01-04 | End: 2024-01-04

## 2024-01-04 RX ADMIN — APIXABAN 5 MG: 5 TABLET, FILM COATED ORAL at 10:23

## 2024-01-04 RX ADMIN — ATENOLOL 50 MG: 50 TABLET ORAL at 20:36

## 2024-01-04 RX ADMIN — GUAIFENESIN 600 MG: 600 TABLET, EXTENDED RELEASE ORAL at 20:35

## 2024-01-04 RX ADMIN — DOXYCYCLINE 100 MG: 100 INJECTION, POWDER, LYOPHILIZED, FOR SOLUTION INTRAVENOUS at 11:59

## 2024-01-04 RX ADMIN — POTASSIUM CHLORIDE 40 MEQ: 750 TABLET, FILM COATED, EXTENDED RELEASE ORAL at 19:07

## 2024-01-04 RX ADMIN — DOXYCYCLINE HYCLATE 100 MG: 100 TABLET, COATED ORAL at 20:35

## 2024-01-04 RX ADMIN — SODIUM CHLORIDE, PRESERVATIVE FREE 10 ML: 5 INJECTION INTRAVENOUS at 10:33

## 2024-01-04 RX ADMIN — ATORVASTATIN CALCIUM 20 MG: 20 TABLET, FILM COATED ORAL at 20:35

## 2024-01-04 RX ADMIN — Medication 15 G: at 19:07

## 2024-01-04 RX ADMIN — GUAIFENESIN 600 MG: 600 TABLET, EXTENDED RELEASE ORAL at 10:23

## 2024-01-04 RX ADMIN — AMLODIPINE BESYLATE 5 MG: 5 TABLET ORAL at 10:23

## 2024-01-04 RX ADMIN — POTASSIUM CHLORIDE 40 MEQ: 750 TABLET, FILM COATED, EXTENDED RELEASE ORAL at 06:08

## 2024-01-04 RX ADMIN — SODIUM CHLORIDE, PRESERVATIVE FREE 10 ML: 5 INJECTION INTRAVENOUS at 20:37

## 2024-01-04 RX ADMIN — APIXABAN 5 MG: 5 TABLET, FILM COATED ORAL at 20:35

## 2024-01-04 RX ADMIN — FUROSEMIDE 40 MG: 40 TABLET ORAL at 06:08

## 2024-01-04 RX ADMIN — ATENOLOL 50 MG: 50 TABLET ORAL at 10:23

## 2024-01-04 ASSESSMENT — PAIN SCALES - GENERAL
PAINLEVEL_OUTOF10: 0

## 2024-01-04 NOTE — ED NOTES
Verbal shift change report given to Lani (oncoming nurse) by Wade (offgoing nurse). Report included the following information Nurse Handoff Report, ED Encounter Summary, ED SBAR, MAR, and Recent Results.

## 2024-01-04 NOTE — PROGRESS NOTES
End of Shift Note    Bedside shift change report given to ,RN (oncoming nurse) by Yamileth Luciano RN (offgoing nurse).  Report included the following information Nurse Handoff Report, MAR, Recent Results, Med Rec Status, Cardiac Rhythm NSR-NSB, Quality Measures, and Neuro Assessment      Shift worked:  7P-7A   Shift summary and any significant changes:    Admitted from the ED for continuity of care.       Concerns for physician to address: See labs.   Zone phone for oncoming shift:        Patient Information  Alicia Torres  74 y.o.  1/3/2024  6:43 AM by Suyapa Royal MD. Alicia Torres was admitted from Home    Problem List  Patient Active Problem List    Diagnosis Date Noted    Hypoxia 01/03/2024    Secondary hypercoagulable state (HCC) 12/21/2023    PAF (paroxysmal atrial fibrillation) ([facility])     Mitral regurgitation     Pyuria 02/04/2017    Impaired glucose tolerance 11/09/2015    HTN, goal below 130/80 11/09/2015    Dyslipidemia 11/09/2015    NAFLD (nonalcoholic fatty liver disease) 11/09/2015    Osteopenia 11/09/2015     Past Medical History:   Diagnosis Date    Dyslipidemia     HTN, goal below 130/80 11/9/2015    Hx of bone density study 06/08/2016    Osteopenic    Hx of mammogram 07/10/2017    Negtaive     Impaired glucose tolerance 11/9/2015    Mitral regurgitation     mild MR    Osteopenia 11/9/2015    PAF (paroxysmal atrial fibrillation) ([facility])     Routine Papanicolaou smear 07/24/2018    Neg pap        Core Measures:  CVA: No No  CHF:No No  PNA:NoNo    Activity:     Number times ambulated in hallways past shift: 0  Number of times OOB to chair past shift: 0    Cardiac:   Cardiac Monitoring: Yes           Access:   Current line(s): PIV  Central Line? No Placement date  Reason Medically Necessary   PICC LINE? No Placement date Reason Medically Necessary     Genitourinary:   Urinary status: voiding   Urinary Catheter? No Placement Date  Reason Medically Necessary     Respiratory:   O2 Device: Nasal

## 2024-01-04 NOTE — PROGRESS NOTES
Hospitalist Progress Note    NAME:   Alicia Torres   : 1950   MRN: [id number]     Date/Time: 2024 3:28 PM  Patient PCP: Karla Canales MD    Estimated discharge date: 2 days  Barriers: Improvement in sodium    Assessment / Plan:    Acute hypoxic respiratory failure due to acute bronchitis  CT chest reviewed  Procal 0.11  COVID 19 and influenza A/B negative  F/u with sputum cx. Normal respiratory melissa  Will add mucinex and empiric PO doxycycline  Patient currently on room air    Severe mitral regurgitation:  Echocardiogram reviewed.  Cardiology recommendations noted  Outpatient transesophageal echocardiogram.    Severe Hyponatremia  Appears dry on exam despite elevated probnp at 2K  ? SIADH  S/p IVF in the ER  Hold HCTZ   Reviewed urine lytes, plasma and urine osm. Suggestive of SiADH  Patient on fluid restriction and lasix one dose given today  Nephrology is consulted     Hypomagnesemia  Hypokalemia  Will monitor and supplement as needed     Incidental finding of abnormality infeioriorly in the LL of the liver  Chronic mild elevation of AST 64  Will need 3 phase CT of liver for further evaluation when pt is improve from acute illness.  Can have this done with PCP outpt.      Hx of PAF  Hx of MR  HTN  Resume home medications except Benicar-HCTZ     Medical Decision Making:  I personally reviewed labs: cbc, bmp  I personally reviewed imaging: CT and XR  Toxic drug monitoring: sodium        Code Status: full  DVT Prophylaxis: eliquis  Baseline: from home, independent    Subjective:     Patient comfortably lying in the bed. She is on room air.  Patient is able to move her extremities without any deficit.  Patient being transferred to intermediate care unit.  Plan of care discussed with family. Will monitor sodium      Objective:     VITALS:   Last 24hrs VS reviewed since prior progress note. Most recent are:  Patient Vitals for the past 24 hrs:   BP Temp Temp src Pulse Resp SpO2   24

## 2024-01-04 NOTE — PROGRESS NOTES
NAME: Alicia Torres        :  1950        MRN:  [id number]                    Assessment   :                                               Plan:  Hyponatremia, acute  Hypokalemia  Hypomagnesemia  HTN  Afib  MVR Sodium 114 to to 119 to 118 with FR, 500 ml LR and potassium replacement     Continue to replace K up to > 4; K 3.3 -->s/p 70 meq K on 1/3 --> give 40 meq PO now    Mg 1.1 to now 1.9-->s/p 3 gms IV Mg on 1/3    Urine osm 407 (c/w high ADH level - likely secondary to MVR)     High BNP, hypoalbuminemia and mildly high AST; clear chest film, but rales right base (resolved after PO lasix this am) and some hypoxia; TSH nml Dec '23     On thiazide for years, poor intake -- at least a component of low solute; stay off thiazide diuretics from now on     Will continue FR, give Lasix 40 mg PO x one, continue potassium replacement, check BMP this afternoon; cards eval for MRV?       Subjective:     Chief Complaint:  denies sob. Feeling better. Great uop to po lasix this am. We discussed the above.    Review of Systems:    Symptom Y/N Comments  Symptom Y/N Comments   Fever/Chills    Chest Pain     Poor Appetite    Edema     Cough    Abdominal Pain     Sputum    Joint Pain     SOB/TAYLOR    Pruritis/Rash     Nausea/vomit    Tolerating PT/OT     Diarrhea    Tolerating Diet     Constipation    Other       Could not obtain due to:      Objective:     VITALS:   Last 24hrs VS reviewed since prior progress note. Most recent are:  Vitals:    24 0332   BP: 133/68   Pulse: 60   Resp: 20   Temp: 98 °F (36.7 °C)   SpO2: 99%       Intake/Output Summary (Last 24 hours) at 2024 0541  Last data filed at 1/3/2024 1528  Gross per 24 hour   Intake --   Output 450 ml   Net -450 ml      Telemetry Reviewed:     PHYSICAL EXAM:  General: NAD  No longer with basilar rales    Lab Data Reviewed: (see below)    Medications Reviewed: (see below)    PMH/SH

## 2024-01-04 NOTE — PROGRESS NOTES
End of Shift Note    Bedside shift change report given to VINCE Love (oncoming nurse) by Yamileth Luciano RN (offgoing nurse).  Report included the following information Nurse Handoff Report, MAR, Recent Results, Med Rec Status, Cardiac Rhythm NSR, Quality Measures, and Neuro Assessment      Shift worked:  7P-7A   Shift summary and any significant changes:     Admitted from the ED for continuity of care.       Concerns for physician to address:  Labs   Zone phone for oncoming shift:   [phone]     Patient Information  Alicia Torres  74 y.o.  1/3/2024  6:43 AM by Suyapa Royal MD. Alicia Torres was admitted from Home    Problem List  Patient Active Problem List    Diagnosis Date Noted    Hypoxia 01/03/2024    Secondary hypercoagulable state (HCC) 12/21/2023    PAF (paroxysmal atrial fibrillation) ([facility])     Mitral regurgitation     Pyuria 02/04/2017    Impaired glucose tolerance 11/09/2015    HTN, goal below 130/80 11/09/2015    Dyslipidemia 11/09/2015    NAFLD (nonalcoholic fatty liver disease) 11/09/2015    Osteopenia 11/09/2015     Past Medical History:   Diagnosis Date    Dyslipidemia     HTN, goal below 130/80 11/9/2015    Hx of bone density study 06/08/2016    Osteopenic    Hx of mammogram 07/10/2017    Negtaive     Impaired glucose tolerance 11/9/2015    Mitral regurgitation     mild MR    Osteopenia 11/9/2015    PAF (paroxysmal atrial fibrillation) ([facility])     Routine Papanicolaou smear 07/24/2018    Neg pap        Core Measures:  CVA: No No  CHF:No No  PNA:NoNo    Activity:     Number times ambulated in hallways past shift: 0  Number of times OOB to chair past shift: 0    Cardiac:   Cardiac Monitoring: Yes           Access:   Current line(s): PIV  Central Line? No Placement date  Reason Medically Necessary   PICC LINE? No Placement date Reason Medically Necessary     Genitourinary:   Urinary status: voiding   Urinary Catheter? No Placement Date  Reason Medically Necessary     Respiratory:   O2 Device: Nasal  cannula  Chronic home O2 use?: no  Incentive spirometer at bedside: no       GI:  Last BM (including prior to admit): 01/02/24  Current diet:  ADULT DIET; Regular; 1000 ml  Passing flatus:Yes  Tolerating current diet: N/A       Pain Management:   Patient states pain is manageable on current regimen: N/A    Skin:  Wayne Scale Score: 20  Interventions: increase time out of bed    Patient Safety:  Fall Score: [place] Total Score: 35  Interventions: bed/chair alarm, assistive devices (walker, cane, etc.), gripper socks, pt to call before getting OOB, and stay with me (per policy)     @Rollbelt  @dexterity to release roll belt  Yes/No ( must document dexterity  here by stating Yes or No here, otherwise this is a restraint and must follow restraint documentation policy.)    DVT prophylaxis:  DVT prophylaxis Med- yes  DVT prophylaxis SCD or JEANNETTE- no     Wounds: (If Applicable)  Wounds- no  Location     Active Consults:  IP CONSULT TO NEPHROLOGY  IP CONSULT TO PALLIATIVE CARE    Length of Stay:  Expected LOS: 2  Actual LOS: 1  Discharge Plan: NATE Luciano RN

## 2024-01-04 NOTE — PROGRESS NOTES
OCCUPATIONAL THERAPY EVALUATION/DISCHARGE  Patient: Alicia Torres (74 y.o. female)  Date: 2024  Primary Diagnosis: Hyponatremia [E87.1]  Hypoxia [R09.02]  Acute respiratory failure with hypoxia (HCC) [J96.01]         Precautions: Fall Risk                  ASSESSMENT :  Based on the objective data below, the patient presents at or close to her functional reported baseline. Pt denies pain. Received resting in bed, alert and oriented x4. Pt on 2L NC with sats 100%; -124bpm; sats on RA 93%. Pt denies TAYLOR with activity. Pt performed bed mobility IND, functional transfers and mobility without AD SBA-Liz. Pt with no LOB and c/o SOB with activity. Pt educated on importance of OOB activity and ambulating to bathroom with nursing supervision. Pt with no further acute OT needs.    Functional Outcome Measure:  The patient scored 90/100 on the Barthel Index outcome measure which is indicative of independent.      Further skilled acute occupational therapy is not indicated at this time.     PLAN :  Recommend with staff: SBA for OOB ADLs    Recommendation for discharge: (in order for the patient to meet his/her long term goals): No skilled occupational therapy    Other factors to consider for discharge: lives alone    IF patient discharges home will need the following DME: none     SUBJECTIVE:   Patient stated, “I feel okay.”    OBJECTIVE DATA SUMMARY:     Past Medical History:   Diagnosis Date    Dyslipidemia     HTN, goal below 130/80 2015    Hx of bone density study 2016    Osteopenic    Hx of mammogram 07/10/2017    Negtaive     Impaired glucose tolerance 2015    Mitral regurgitation     mild MR    Osteopenia 2015    PAF (paroxysmal atrial fibrillation) ([facility])     Routine Papanicolaou smear 2018    Neg pap      Past Surgical History:   Procedure Laterality Date    APPENDECTOMY  1963     SECTION      COLONOSCOPY      ORTHOPEDIC SURGERY  13    right foot       Prior  physical therapist and registered nurse    Patient Education  Education Given To: Patient  Education Provided: Role of Therapy;Transfer Training;Equipment;Plan of Care;Energy Conservation;Fall Prevention Strategies;Precautions;ADL Adaptive Strategies;IADL Safety;Home Exercise Program  Education Method: Demonstration;Verbal  Barriers to Learning: None  Education Outcome: Verbalized understanding;Demonstrated understanding    Thank you for this referral.  TAY BRICE, OT  Minutes: 18    Occupational Therapy Evaluation Charge Determination   History Examination Decision-Making   LOW Complexity : Brief history review  LOW Complexity: 1-3 Performance deficits relating to physical, cognitive, or psychosocial skills that result in activity limitations and/or participation restrictions LOW Complexity: No comorbidities that affect functional and  no verbal  or physical assist needed to complete eval tasks   Based on the above components, the patient evaluation is determined to be of the following complexity level: Low

## 2024-01-04 NOTE — PROGRESS NOTES
Na 119  K 3.5    Will give a dose of Ure-Na tonight (I am reticent to use salt tabs given h/o HTN and mild volume overload on admission)  Will repeat PO K again tonight    McNeer

## 2024-01-04 NOTE — PROGRESS NOTES
Repeat BMP - Na 114 to 115 to 119 (appropriate rise).  K now 4.1  Mg 1.1 to 1.5     Continue with FR  Will give another Mg dose.  Goal Na by tomorrow morning is 120 to 122.    Amari

## 2024-01-04 NOTE — ED NOTES
1915: Bedside and Verbal shift change report given to Sandra RN/Radha RN (oncoming nurse) by Wade RN (offgoing nurse). Report included the following information Nurse Handoff Report.  Per off going nurse, perfect serve Nephology when 1900 BMP results.    1959: Tried to Perfect Serve Gely at this time, perfect serve stated MD Ng was covering. No answer at this time for Nephrology     2000: Nephrology is aware at this time, with a note placed by MD Fitzpatrick.    2127: Report tried to be called, no RNS answering phone    2130: RN called Charge RN was informed they are still in shift change and that they have covid + patients. Bed has been assigned for an hour    2300: TRANSFER - OUT REPORT:    Verbal report given to Carla on Alicia Torres  being transferred to [facility] for routine progression of patient care       Report consisted of patient's Situation, Background, Assessment and   Recommendations(SBAR).     Information from the following report(s) Nurse Handoff Report was reviewed with the receiving nurse.    Iglesia Fall Assessment:    Presents to emergency department  because of falls (Syncope, seizure, or loss of consciousness): No  Age > 70: Yes  Altered Mental Status, Intoxication with alcohol or substance confusion (Disorientation, impaired judgment, poor safety awaremess, or inability to follow instructions): No  Impaired Mobility: Ambulates or transfers with assistive devices or assistance; Unable to ambulate or transer.: No  Nursing Judgement: No          Lines:   Peripheral IV 01/03/24 Right Forearm (Active)   Site Assessment Clean, dry & intact 01/03/24 1038   Line Status Blood return noted 01/03/24 1038   Phlebitis Assessment No symptoms 01/03/24 1038   Infiltration Assessment 0 01/03/24 1038        Opportunity for questions and clarification was provided.      Patient transported with:  Monitor and O2 @ 2lpm

## 2024-01-04 NOTE — CONSULTS
[facility]        Consult    NAME: Alicia Torres   :  1950   MRN:  [id number]     Date/Time:  2024 11:01 AM    Patient PCP: Karla Canales MD  ________________________________________________________________________     Assessment:     Cough/URI/Bronchitis  Hyponatremia with sodium of 114    - No hx of CAD, stress in  no ischemia  - Mitral regurgitation with echo 2024 with normal EF and LV size, mild AI, with mildly dilated aortic root, mitral valve prolapse with moderate possibly more mitral regurgitation, normal pulmonary artery pressures.  - hx of PAF with episode in , no known recurrence with negative EVR  - HTN  - dyslipidemia  - Quit tobacco in   - No etoh, no drugs  , lives alone, 3 kids, Son practices internal medicine with daughter in law intensivist, retired, drives, ADLs        Plan:     Recent cardiology appt with no symptoms and reasonably active.    Current presentation, most likely viral bronchitis with hyponatremia from thiazide use.    No chest pain, negative hs troponin  Euvolemic on exam, no edema, no orthopnea  Sinus    She certianly has mitral valve prolapse with at least moderate mitral regurgitation, normal LV size, normal pulmonary artery pressures, no recent afib.  At this point does not appear to have acute indication for MV repair, but would continue close follow up with cardiologist, consider PATRICK at next follow up.    - cont eliquis  - Cont atenolol  - Cont amlodipine  - holding benicar hcT, discussed with Dr. Fitzpatrick, likely add low dose furosemide at discharge  - Cont statin         [x]       High complexity decision making was performed in this patient at high risk for decompensation with multiple organ involvement.      Subjective:   CHIEF COMPLAINT: Weakness, hypoxia    HISTORY OF PRESENT ILLNESS:       Alicia Torres is a 74 y.o.  female with PMHx significant for PAF, HTN, HLD, presents to the ER for evaluation  medications    Medication Sig Start Date End Date Taking? Authorizing Provider   atenolol (TENORMIN) 50 MG tablet TAKE 1 TABLET BY MOUTH TWO TIMES DAILY 12/17/23   Karla Canales MD   lovastatin (MEVACOR) 20 MG tablet TAKE 1 TABLET BY MOUTH NIGHTLY 12/17/23   Karla Canales MD   ELIQUIS 5 MG TABS tablet TAKE 1 TABLET BY MOUTH TWO TIMES DAILY 12/17/23   Karla Canales MD   Multiple Vitamin (MULTIVITAMIN PO) Take by mouth daily    Christiana Brush MD   b complex vitamins capsule Take 1 capsule by mouth daily    Christiana Brush MD   amLODIPine (NORVASC) 5 MG tablet TAKE 1 TABLET BY MOUTH EVERY DAY 11/27/23   Tano Dailey MD   olmesartan-hydroCHLOROthiazide (BENICAR HCT) 20-12.5 MG per tablet TAKE 1 TABLET BY MOUTH EVERY DAY 8/25/23   Tano Dailey MD   Cholecalciferol 50 MCG (2000 UT) CAPS Take 1 capsule by mouth daily 11/10/15   Automatic Reconciliation, Ar       Recent Results (from the past 24 hour(s))   Basic Metabolic Panel    Collection Time: 01/03/24 11:08 AM   Result Value Ref Range    Sodium 115 (LL) 136 - 145 mmol/L    Potassium 2.6 (LL) 3.5 - 5.1 mmol/L    Chloride 75 (L) 97 - 108 mmol/L    CO2 31 21 - 32 mmol/L    Anion Gap 9 5 - 15 mmol/L    Glucose 114 (H) 65 - 100 mg/dL    BUN 4 (L) 6 - 20 MG/DL    Creatinine 0.41 (L) 0.55 - 1.02 MG/DL    Bun/Cre Ratio 10 (L) 12 - 20      Est, Glom Filt Rate >60 >60 ml/min/1.73m2    Calcium 8.2 (L) 8.5 - 10.1 MG/DL   Magnesium    Collection Time: 01/03/24 11:08 AM   Result Value Ref Range    Magnesium 1.1 (L) 1.6 - 2.4 mg/dL   Osmolality    Collection Time: 01/03/24 11:08 AM   Result Value Ref Range    Serum Osmolality 239 mOsm/kg H2O   Osmolality, Urine    Collection Time: 01/03/24 12:49 PM   Result Value Ref Range    Osmolality, Ur 407 MOSM/kg H2O   Potassium, urine, random    Collection Time: 01/03/24 12:49 PM   Result Value Ref Range    POTASSIUM, RANDOM URINE 41 MMOL/L   Sodium, urine, random    Collection Time: 01/03/24 12:49 PM   Result

## 2024-01-04 NOTE — PROGRESS NOTES
PHYSICAL THERAPY EVALUATION/DISCHARGE    Patient: Alicia Torres (74 y.o. female)  Date: 1/4/2024  Primary Diagnosis: Hyponatremia [E87.1]  Hypoxia [R09.02]  Acute respiratory failure with hypoxia (HCC) [J96.01]       Precautions: Fall Risk, bed alarm (per unit protocol)       ASSESSMENT AND RECOMMENDATIONS:  Based on the objective data below, the patient presented to ED after recent upper respiratory infection, measured her O2 at home and was 87% while walking. Pt admitted from ED with hypoxia. She was received for PT imani seated in chair, agreeable to therapy. Pt reports her mobility is at her baseline but she feels like her \"heart is sometimes racing\". She demonstrated all mobility with SBA-Supervision for safety, no devices needed, including transfers and gait >200ft. No major gait deviations noted but pt with path deviations and mild imbalances noted in hallway. HR reading up to 128 with activity, quickly returning to upper 110s with rest. O2 sats >95% on room air at rest and with mobility. She was left end of session up in chair, all needs met, call bell in reach, chair alarm on per unit protocol and B LE elevated per her request. She has cleared acute care PT at this time and is at her mobility baseline. Will sign off.    Functional Outcome Measure:  The patient scored 24/24 on the [facility] outcome measure which is indicative of likely DC home at independent level.          Further skilled acute physical therapy is not indicated at this time.       PLAN :  Recommendation for discharge: (in order for the patient to meet his/her long term goals): No skilled physical therapy    Other factors to consider for discharge: no additional factors    IF patient discharges home will need the following DME: none       SUBJECTIVE:   Patient stated “I think this is all anxiety and in my head honestly.”    OBJECTIVE DATA SUMMARY:     Past Medical History:   Diagnosis Date    Dyslipidemia     HTN, goal below 130/80  or participation in recreation  LOW Complexity : Stable, uncomplicated  AM-PAC  LOW      Based on the above components, the patient evaluation is determined to be of the following complexity level: Low

## 2024-01-05 LAB
ANION GAP SERPL CALC-SCNC: 4 MMOL/L (ref 5–15)
BACTERIA SPEC CULT: ABNORMAL
BACTERIA SPEC CULT: ABNORMAL
BUN SERPL-MCNC: 23 MG/DL (ref 6–20)
BUN/CREAT SERPL: 48 (ref 12–20)
CALCIUM SERPL-MCNC: 8.5 MG/DL (ref 8.5–10.1)
CHLORIDE SERPL-SCNC: 85 MMOL/L (ref 97–108)
CO2 SERPL-SCNC: 31 MMOL/L (ref 21–32)
CREAT SERPL-MCNC: 0.48 MG/DL (ref 0.55–1.02)
GLUCOSE SERPL-MCNC: 120 MG/DL (ref 65–100)
GRAM STN SPEC: ABNORMAL
MAGNESIUM SERPL-MCNC: 1.4 MG/DL (ref 1.6–2.4)
POTASSIUM SERPL-SCNC: 3.8 MMOL/L (ref 3.5–5.1)
SERVICE CMNT-IMP: ABNORMAL
SODIUM SERPL-SCNC: 120 MMOL/L (ref 136–145)

## 2024-01-05 PROCEDURE — 6370000000 HC RX 637 (ALT 250 FOR IP): Performed by: INTERNAL MEDICINE

## 2024-01-05 PROCEDURE — 6360000002 HC RX W HCPCS: Performed by: STUDENT IN AN ORGANIZED HEALTH CARE EDUCATION/TRAINING PROGRAM

## 2024-01-05 PROCEDURE — 36415 COLL VENOUS BLD VENIPUNCTURE: CPT

## 2024-01-05 PROCEDURE — 93005 ELECTROCARDIOGRAM TRACING: CPT | Performed by: STUDENT IN AN ORGANIZED HEALTH CARE EDUCATION/TRAINING PROGRAM

## 2024-01-05 PROCEDURE — 2580000003 HC RX 258: Performed by: STUDENT IN AN ORGANIZED HEALTH CARE EDUCATION/TRAINING PROGRAM

## 2024-01-05 PROCEDURE — 2580000003 HC RX 258: Performed by: INTERNAL MEDICINE

## 2024-01-05 PROCEDURE — 6370000000 HC RX 637 (ALT 250 FOR IP): Performed by: STUDENT IN AN ORGANIZED HEALTH CARE EDUCATION/TRAINING PROGRAM

## 2024-01-05 PROCEDURE — 2500000003 HC RX 250 WO HCPCS: Performed by: STUDENT IN AN ORGANIZED HEALTH CARE EDUCATION/TRAINING PROGRAM

## 2024-01-05 PROCEDURE — 2060000000 HC ICU INTERMEDIATE R&B

## 2024-01-05 PROCEDURE — 80048 BASIC METABOLIC PNL TOTAL CA: CPT

## 2024-01-05 PROCEDURE — 83735 ASSAY OF MAGNESIUM: CPT

## 2024-01-05 RX ORDER — POTASSIUM CHLORIDE 750 MG/1
40 TABLET, FILM COATED, EXTENDED RELEASE ORAL ONCE
Status: COMPLETED | OUTPATIENT
Start: 2024-01-05 | End: 2024-01-05

## 2024-01-05 RX ORDER — SODIUM CHLORIDE 1 G/1
1 TABLET ORAL 2 TIMES DAILY WITH MEALS
Status: DISCONTINUED | OUTPATIENT
Start: 2024-01-05 | End: 2024-01-06

## 2024-01-05 RX ORDER — AMIODARONE HYDROCHLORIDE 200 MG/1
400 TABLET ORAL 2 TIMES DAILY
Status: DISCONTINUED | OUTPATIENT
Start: 2024-01-05 | End: 2024-01-08

## 2024-01-05 RX ORDER — ATENOLOL 25 MG/1
50 TABLET ORAL DAILY
Status: DISCONTINUED | OUTPATIENT
Start: 2024-01-06 | End: 2024-01-09 | Stop reason: HOSPADM

## 2024-01-05 RX ADMIN — CEFTRIAXONE 1000 MG: 1 INJECTION, POWDER, FOR SOLUTION INTRAMUSCULAR; INTRAVENOUS at 18:38

## 2024-01-05 RX ADMIN — GUAIFENESIN 600 MG: 600 TABLET, EXTENDED RELEASE ORAL at 08:26

## 2024-01-05 RX ADMIN — AMIODARONE HYDROCHLORIDE 400 MG: 200 TABLET ORAL at 20:56

## 2024-01-05 RX ADMIN — GUAIFENESIN 600 MG: 600 TABLET, EXTENDED RELEASE ORAL at 20:56

## 2024-01-05 RX ADMIN — Medication 1 G: at 18:29

## 2024-01-05 RX ADMIN — APIXABAN 5 MG: 5 TABLET, FILM COATED ORAL at 08:26

## 2024-01-05 RX ADMIN — POTASSIUM CHLORIDE 40 MEQ: 750 TABLET, FILM COATED, EXTENDED RELEASE ORAL at 06:27

## 2024-01-05 RX ADMIN — AMIODARONE HYDROCHLORIDE 400 MG: 200 TABLET ORAL at 09:54

## 2024-01-05 RX ADMIN — ATORVASTATIN CALCIUM 20 MG: 20 TABLET, FILM COATED ORAL at 20:56

## 2024-01-05 RX ADMIN — APIXABAN 5 MG: 5 TABLET, FILM COATED ORAL at 20:56

## 2024-01-05 RX ADMIN — SODIUM CHLORIDE, PRESERVATIVE FREE 10 ML: 5 INJECTION INTRAVENOUS at 08:27

## 2024-01-05 RX ADMIN — Medication 1 G: at 08:26

## 2024-01-05 RX ADMIN — SODIUM CHLORIDE, PRESERVATIVE FREE 10 ML: 5 INJECTION INTRAVENOUS at 20:57

## 2024-01-05 RX ADMIN — DOXYCYCLINE 100 MG: 100 INJECTION, POWDER, LYOPHILIZED, FOR SOLUTION INTRAVENOUS at 21:02

## 2024-01-05 RX ADMIN — DOXYCYCLINE HYCLATE 100 MG: 100 TABLET, COATED ORAL at 08:26

## 2024-01-05 RX ADMIN — SODIUM CHLORIDE 5 ML/HR: 9 INJECTION, SOLUTION INTRAVENOUS at 18:34

## 2024-01-05 RX ADMIN — ATENOLOL 50 MG: 50 TABLET ORAL at 08:26

## 2024-01-05 RX ADMIN — AMLODIPINE BESYLATE 5 MG: 5 TABLET ORAL at 08:26

## 2024-01-05 ASSESSMENT — PAIN SCALES - GENERAL
PAINLEVEL_OUTOF10: 0
PAINLEVEL_OUTOF10: 0
PAINLEVEL_OUTOF10: 3
PAINLEVEL_OUTOF10: 0

## 2024-01-05 ASSESSMENT — PAIN DESCRIPTION - ORIENTATION: ORIENTATION: MID

## 2024-01-05 ASSESSMENT — PAIN DESCRIPTION - PAIN TYPE: TYPE: ACUTE PAIN

## 2024-01-05 ASSESSMENT — PAIN DESCRIPTION - DESCRIPTORS: DESCRIPTORS: PRESSURE

## 2024-01-05 ASSESSMENT — PAIN DESCRIPTION - LOCATION: LOCATION: CHEST

## 2024-01-05 NOTE — PROGRESS NOTES
Pt Note    Duplicate order received.  Pt evaluated yesterday and has no needs, as her mobility is currently at her baseline.  Will sign off.

## 2024-01-05 NOTE — PROGRESS NOTES
Duplicate order received. Pt evaluated yesterday and has no needs, as her ADL performance is currently at her baseline.  Will sign off.

## 2024-01-05 NOTE — PROGRESS NOTES
NAME: Alicia Torres        :  1950        MRN:  [id number]                    Assessment   :                                               Plan:  Hyponatremia, acute  Hypokalemia  Hypomagnesemia, improved  HTN  Afib  MVR Sodium cesar 114, now 120 with FR, lasix and a dose of Ure-Na     Secondary to thiazide and poor intake (primary cause) and from high ADH presumably from cardiac (mvr, afib); stay off thiazide diuretics from now on    For PATRICK soon to better evaluate MV     Continue to replace K up to > 4; give KCL 40 meq x one today    Urine osm 407 (c/w high ADH level - secondary to MVR?)      Will continue FR, continue potassium replacement, continue to encourage increased solute, try salt tabs today     Would hold on discharge until we can get Na up to near 130       Subjective:     Chief Complaint:  denies sob. Afib last night. Some chest pressure this morning.  We discussed the above.    Review of Systems:    Symptom Y/N Comments  Symptom Y/N Comments   Fever/Chills    Chest Pain     Poor Appetite    Edema     Cough    Abdominal Pain     Sputum    Joint Pain     SOB/TAYLOR    Pruritis/Rash     Nausea/vomit    Tolerating PT/OT     Diarrhea    Tolerating Diet     Constipation    Other       Could not obtain due to:      Objective:     VITALS:   Last 24hrs VS reviewed since prior progress note. Most recent are:  Vitals:    24 0310   BP: 135/83   Pulse: 84   Resp: 16   Temp: 97.4 °F (36.3 °C)   SpO2: 95%       Intake/Output Summary (Last 24 hours) at 2024 0602  Last data filed at 2024 1918  Gross per 24 hour   Intake 1690.4 ml   Output --   Net 1690.4 ml        Telemetry Reviewed:     PHYSICAL EXAM:  General: NAD  No longer with basilar rales    Lab Data Reviewed: (see below)    Medications Reviewed: (see below)    PMH/SH reviewed - no change compared to

## 2024-01-05 NOTE — CARE COORDINATION
Care Management Initial Assessment       RUR: 12  Readmission? No  1st IM letter given? Yes   1st  letter given: No         01/04/24 1957   Service Assessment   Patient Orientation Alert and Oriented   Cognition Alert   History Provided By Child/Family  (Son Henrique Torres)   Primary Caregiver Self  (Pt lives alone , family lives near by 3 mile from pt's house, normally daily check on pt.)   Prior Functional Level Independent in ADLs/IADLs   Current Functional Level Independent in ADLs/IADLs   Can patient return to prior living arrangement Yes   Ability to make needs known: Good   Family able to assist with home care needs: Yes  (Pt may stay with son and family  for sometime until she more stablize.)   Financial Resources Medicare   Social/Functional History   Lives With Alone   Type of Home Condo   Home Layout Two level   Entrance Stairs - Number of Steps No steps   Bathroom Shower/Tub Walk-in shower   Home Equipment   (Pt is not using any DME)   Receives Help From Family   ADL Assistance Independent   Homemaking Assistance Independent   Ambulation Assistance Independent   Transfer Assistance Independent   Active  Yes   Mode of Transportation Car   Occupation Retired   Discharge Planning   Type of Residence   (Pt has some previous HH experiance)   Living Arrangements Children  (Pt will stay for some time with son and familyif necessary)   Patient expects to be discharged to: House   Services At/After Discharge   Mode of Transport at Discharge Self  (Son or DIL will transport pt back to home)       [facility]    Ad[facility] Care Planning     General Advance Care Planning (ACP) Conversation    Date of Conversation: 1/3/2024  Conducted with: Patient with Decision Making Capacity    Healthcare Decision Maker:    Primary Decision Maker: Henrique Torres - Child - [phone]    Secondary Decision Maker: Ashlee Gifford - Daughter-in-Law - [phone]  Click here to complete Healthcare

## 2024-01-05 NOTE — PROGRESS NOTES
Hospitalist Progress Note    NAME:   Alicia Torres   : 1950   MRN: [id number]     Date/Time: 2024 12:57 PM  Patient PCP: Karla Canales MD    Estimated discharge date:   Barriers: TEE1/8, low Na    Assessment / Plan:    Acute hypoxic respiratory failure due to acute bronchitis  CT chest reviewed  Procal 0.11  COVID 19 and influenza A/B negative  F/u with sputum cx. Normal respiratory melissa  Will add mucinex and empiric and doxycycline  Added ceftriaxone  Patient currently on room air    Severe mitral regurgitation:  Echocardiogram reviewed.  Cardiology recommendations noted  PATRICK Monday    Severe Hyponatremia  Hold HCTZ   Reviewed urine lytes, plasma and urine osm. Suggestive of SiADH (likely due to increase cardiac pressure MVR)  1 dose of Lasix and Ure-Na given  Nephrology is consulted  Follow-up with BMP      Hypomagnesemia  Hypokalemia  Will monitor and supplement as needed     Incidental finding of abnormality infeioriorly in the LL of the liver  Chronic mild elevation of AST 64  Will need 3 phase CT of liver for further evaluation when pt is improve from acute illness.  Can have this done with PCP outpt.      Hx of PAF  Hx of MR  HTN  Resume home medications except Benicar-HCTZ     Medical Decision Making:  I personally reviewed labs: cbc, bmp  I personally reviewed imaging: CT and XR  Toxic drug monitoring: sodium        Code Status: full  DVT Prophylaxis: eliquis  Baseline: from home, independent    Subjective:     Patient seen and examined at the bedside, she states she feeling slightly better, no shortness of breath. Will monitor sodium      Objective:     VITALS:   Last 24hrs VS reviewed since prior progress note. Most recent are:  Patient Vitals for the past 24 hrs:   BP Temp Temp src Pulse Resp SpO2   24 0954 111/74 -- -- 74 -- --   24 0800 134/78 98.1 °F (36.7 °C) Oral (!) 118 16 96 %   24 0310 135/83 97.4 °F (36.3 °C) Oral 84 16 95 %   24 2353  1.5* 1.9  --   --    LABALBU 3.1*  --   --   --   --   --    BILITOT 0.6  --   --   --   --   --    AST 64*  --   --   --   --   --    ALT 39  --   --   --   --   --          Signed: Ashley Archer MD

## 2024-01-05 NOTE — PLAN OF CARE
wounds/incisions during mobilization   Obtain physical therapy/occupational therapy consults as needed   Apply continuous passive motion per provider or physical therapy orders to increase flexion toward goal   Instruct patient/family in ordered activity level     Problem: Gastrointestinal - Adult  Goal: Minimal or absence of nausea and vomiting  Outcome: Progressing  Flowsheets (Taken 1/4/2024 1945)  Minimal or absence of nausea and vomiting:   Administer IV fluids as ordered to ensure adequate hydration   Maintain NPO status until nausea and vomiting are resolved   Nasogastric tube to low intermittent suction as ordered   Administer ordered antiemetic medications as needed   Provide nonpharmacologic comfort measures as appropriate   Nutrition consult to assist patient with adequate nutrition and appropriate food choices   Advance diet as tolerated, if ordered     Problem: Genitourinary - Adult  Goal: Absence of urinary retention  Outcome: Progressing  Flowsheets (Taken 1/4/2024 1945)  Absence of urinary retention:   Assess patient’s ability to void and empty bladder   Place urinary catheter per Licensed Independent Practitioner order if needed   Monitor intake/output and perform bladder scan as needed   Discuss with Licensed Independent Practitioner  medications to alleviate retention as needed   Discuss catheterization for long term situations as appropriate     Problem: Infection - Adult  Goal: Absence of infection at discharge  Outcome: Progressing  Flowsheets (Taken 1/4/2024 1945)  Absence of infection at discharge:   Assess and monitor for signs and symptoms of infection   Monitor lab/diagnostic results   Monitor all insertion sites i.e., indwelling lines, tubes and drains   Monitor endotracheal (as able) and nasal secretions for changes in amount and color   [place] appropriate cooling/warming therapies per order   Administer medications as ordered   Instruct and encourage patient and family to use good

## 2024-01-05 NOTE — PROGRESS NOTES
[facility]     Progress Note      1/5/2024 9:31 AM  NAME: Alicia Torres   MRN:  [id number]   Admit Diagnosis: Hyponatremia [E87.1]  Hypoxia [R09.02]  Acute respiratory failure with hypoxia (HCC) [J96.01]                Assessment:       Cough/URI/Bronchitis  Hyponatremia with sodium of 114     - No hx of CAD, stress in 2021 no ischemia  - Mitral regurgitation with echo 1/2024 with normal EF and LV size, mild AI, with mildly dilated aortic root, mitral valve prolapse with moderate possibly more mitral regurgitation, normal pulmonary artery pressures.  - hx of PAF with episode in 2021, no known recurrence with negative EVR  - HTN  - dyslipidemia  - Quit tobacco in 2012  - No etoh, no drugs  , lives alone, 3 kids, Son practices internal medicine with daughter in law intensivist, retired, drives, ADLs    Cardiologist;  Tano Joseph                     Plan:        Recent cardiology appt with no symptoms and reasonably active.     Current presentation, most likely viral bronchitis with hyponatremia from thiazide use.     No chest pain, negative hs troponin  Euvolemic on exam, no edema, no orthopnea  Sinus, Recurrent afib 1/5/2024.    Confusing picture.  Recent cardiology appt feeling well.  Viral illness with decreased po intake with low sodium at presentation.  Concern for CHF at initial presentation.  Now with recurrent afib.    Continue to correct sodium, discussed with Dr. Fitzpatrick, salt tabs today, will need to watch for CHF.    Will add amiodarone, hopefully will convert to sinus.    Favor PATRICK Monday 9AM as long as sodium improved, ideally in sinus to further evaluate mitral valve.       - cont eliquis until Sunday night.  - Add amiodarone 400mg po bid, discharge on 200mg daily  - Decrease atenolol 50mg bid to 50mg daily  - Cont amlodipine  - holding benicar hcT, discussed with Dr. Fitzpatrick, likely add low dose furosemide at discharge  - Cont statin     Discussed with renal and  20 mg Oral Nightly    guaiFENesin (MUCINEX) extended release tablet 600 mg  600 mg Oral BID    hydrALAZINE (APRESOLINE) injection 10 mg  10 mg IntraVENous Q6H PRN         Maneul Bray MD

## 2024-01-06 LAB
ANION GAP SERPL CALC-SCNC: 4 MMOL/L (ref 5–15)
BUN SERPL-MCNC: 12 MG/DL (ref 6–20)
BUN/CREAT SERPL: 19 (ref 12–20)
CALCIUM SERPL-MCNC: 9.1 MG/DL (ref 8.5–10.1)
CHLORIDE SERPL-SCNC: 94 MMOL/L (ref 97–108)
CO2 SERPL-SCNC: 30 MMOL/L (ref 21–32)
CREAT SERPL-MCNC: 0.64 MG/DL (ref 0.55–1.02)
EKG ATRIAL RATE: 58 BPM
EKG DIAGNOSIS: NORMAL
EKG P AXIS: 91 DEGREES
EKG P-R INTERVAL: 212 MS
EKG Q-T INTERVAL: 520 MS
EKG QRS DURATION: 102 MS
EKG QTC CALCULATION (BAZETT): 510 MS
EKG R AXIS: 16 DEGREES
EKG T AXIS: 43 DEGREES
EKG VENTRICULAR RATE: 58 BPM
ERYTHROCYTE [DISTWIDTH] IN BLOOD BY AUTOMATED COUNT: 11.9 % (ref 11.5–14.5)
GLUCOSE SERPL-MCNC: 112 MG/DL (ref 65–100)
HCT VFR BLD AUTO: 33.8 % (ref 35–47)
HGB BLD-MCNC: 11.6 G/DL (ref 11.5–16)
MAGNESIUM SERPL-MCNC: 1.6 MG/DL (ref 1.6–2.4)
MCH RBC QN AUTO: 34 PG (ref 26–34)
MCHC RBC AUTO-ENTMCNC: 34.3 G/DL (ref 30–36.5)
MCV RBC AUTO: 99.1 FL (ref 80–99)
NRBC # BLD: 0 K/UL (ref 0–0.01)
NRBC BLD-RTO: 0 PER 100 WBC
PLATELET # BLD AUTO: 224 K/UL (ref 150–400)
PMV BLD AUTO: 9.2 FL (ref 8.9–12.9)
POTASSIUM SERPL-SCNC: 4.5 MMOL/L (ref 3.5–5.1)
RBC # BLD AUTO: 3.41 M/UL (ref 3.8–5.2)
SODIUM SERPL-SCNC: 128 MMOL/L (ref 136–145)
WBC # BLD AUTO: 11 K/UL (ref 3.6–11)

## 2024-01-06 PROCEDURE — 2500000003 HC RX 250 WO HCPCS: Performed by: STUDENT IN AN ORGANIZED HEALTH CARE EDUCATION/TRAINING PROGRAM

## 2024-01-06 PROCEDURE — 6370000000 HC RX 637 (ALT 250 FOR IP): Performed by: INTERNAL MEDICINE

## 2024-01-06 PROCEDURE — 83735 ASSAY OF MAGNESIUM: CPT

## 2024-01-06 PROCEDURE — 2580000003 HC RX 258: Performed by: STUDENT IN AN ORGANIZED HEALTH CARE EDUCATION/TRAINING PROGRAM

## 2024-01-06 PROCEDURE — 2060000000 HC ICU INTERMEDIATE R&B

## 2024-01-06 PROCEDURE — 6360000002 HC RX W HCPCS: Performed by: STUDENT IN AN ORGANIZED HEALTH CARE EDUCATION/TRAINING PROGRAM

## 2024-01-06 PROCEDURE — 2580000003 HC RX 258: Performed by: INTERNAL MEDICINE

## 2024-01-06 PROCEDURE — 36415 COLL VENOUS BLD VENIPUNCTURE: CPT

## 2024-01-06 PROCEDURE — 85027 COMPLETE CBC AUTOMATED: CPT

## 2024-01-06 PROCEDURE — 80048 BASIC METABOLIC PNL TOTAL CA: CPT

## 2024-01-06 RX ADMIN — SODIUM CHLORIDE, PRESERVATIVE FREE 10 ML: 5 INJECTION INTRAVENOUS at 20:25

## 2024-01-06 RX ADMIN — SODIUM CHLORIDE: 9 INJECTION, SOLUTION INTRAVENOUS at 20:33

## 2024-01-06 RX ADMIN — APIXABAN 5 MG: 5 TABLET, FILM COATED ORAL at 08:14

## 2024-01-06 RX ADMIN — GUAIFENESIN 600 MG: 600 TABLET, EXTENDED RELEASE ORAL at 08:15

## 2024-01-06 RX ADMIN — GUAIFENESIN 600 MG: 600 TABLET, EXTENDED RELEASE ORAL at 20:24

## 2024-01-06 RX ADMIN — SODIUM CHLORIDE: 9 INJECTION, SOLUTION INTRAVENOUS at 17:09

## 2024-01-06 RX ADMIN — AMLODIPINE BESYLATE 5 MG: 5 TABLET ORAL at 08:15

## 2024-01-06 RX ADMIN — ATORVASTATIN CALCIUM 20 MG: 20 TABLET, FILM COATED ORAL at 20:24

## 2024-01-06 RX ADMIN — AMIODARONE HYDROCHLORIDE 400 MG: 200 TABLET ORAL at 20:23

## 2024-01-06 RX ADMIN — DOXYCYCLINE 100 MG: 100 INJECTION, POWDER, LYOPHILIZED, FOR SOLUTION INTRAVENOUS at 20:34

## 2024-01-06 RX ADMIN — SODIUM CHLORIDE: 9 INJECTION, SOLUTION INTRAVENOUS at 08:19

## 2024-01-06 RX ADMIN — APIXABAN 5 MG: 5 TABLET, FILM COATED ORAL at 20:24

## 2024-01-06 RX ADMIN — ATENOLOL 50 MG: 25 TABLET ORAL at 08:14

## 2024-01-06 RX ADMIN — Medication 1 G: at 08:15

## 2024-01-06 RX ADMIN — Medication 15 G: at 12:10

## 2024-01-06 RX ADMIN — SODIUM CHLORIDE, PRESERVATIVE FREE 10 ML: 5 INJECTION INTRAVENOUS at 08:15

## 2024-01-06 RX ADMIN — CEFTRIAXONE 1000 MG: 1 INJECTION, POWDER, FOR SOLUTION INTRAMUSCULAR; INTRAVENOUS at 17:10

## 2024-01-06 RX ADMIN — AMIODARONE HYDROCHLORIDE 400 MG: 200 TABLET ORAL at 08:15

## 2024-01-06 RX ADMIN — DOXYCYCLINE 100 MG: 100 INJECTION, POWDER, LYOPHILIZED, FOR SOLUTION INTRAVENOUS at 08:20

## 2024-01-06 NOTE — PLAN OF CARE
Problem: Discharge Planning  Goal: Discharge to home or other facility with appropriate resources  1/6/2024 0920 by Moraima Segovia RN  Outcome: Progressing  1/5/2024 1948 by Holli Zepeda RN  Outcome: Progressing     Problem: Pain  Goal: Verbalizes/displays adequate comfort level or baseline comfort level  1/6/2024 0920 by Moraima Segovia RN  Outcome: Progressing  1/5/2024 1948 by Holli Zepeda RN  Outcome: Progressing     Problem: Safety - Adult  Goal: Free from fall injury  Outcome: Progressing     Problem: Neurosensory - Adult  Goal: Achieves stable or improved neurological status  Outcome: Progressing     Problem: Respiratory - Adult  Goal: Achieves optimal ventilation and oxygenation  Outcome: Progressing     Problem: Cardiovascular - Adult  Goal: Maintains optimal cardiac output and hemodynamic stability  Outcome: Progressing     Problem: Skin/Tissue Integrity - Adult  Goal: Skin integrity remains intact  Outcome: Progressing     Problem: Musculoskeletal - Adult  Goal: Return mobility to safest level of function  Outcome: Progressing     Problem: Gastrointestinal - Adult  Goal: Minimal or absence of nausea and vomiting  Outcome: Progressing     Problem: Genitourinary - Adult  Goal: Absence of urinary retention  Outcome: Progressing     Problem: Infection - Adult  Goal: Absence of infection at discharge  Outcome: Progressing     Problem: Metabolic/Fluid and Electrolytes - Adult  Goal: Electrolytes maintained within normal limits  Outcome: Progressing     Problem: Hematologic - Adult  Goal: Maintains hematologic stability  Outcome: Progressing     Problem: Skin/Tissue Integrity  Goal: Absence of new skin breakdown  Description: 1.  Monitor for areas of redness and/or skin breakdown  2.  Assess vascular access sites hourly  3.  Every 4-6 hours minimum:  Change oxygen saturation probe site  4.  Every 4-6 hours:  If on nasal continuous positive airway pressure, respiratory therapy assess nares  and determine need for appliance change or resting period.  Outcome: Progressing

## 2024-01-06 NOTE — PROGRESS NOTES
Comprehensive Nutrition Assessment    Type and Reason for Visit:  Initial, Consult    Nutrition Recommendations/Plan:   Regular diet  Ensure Plus TID  Please document % meals and supplements consumed in flowsheet I/O's under intake      Malnutrition Assessment:  Malnutrition Status:  At risk for malnutrition (Comment) (01/06/24 1252)    Decreased appetite x1-2 weeks    Nutrition Assessment:     Consult received for poor PO intake. Pt medically noted for acute hypoxic respiratory failure 2/2 bronchitis, severe mitral regurgitation, severe hyponatremia, incidental finding of abnormality inferiorly in LL of liver, hx PAF, HTN. Pt reports her appetite has been decreased for the last 1-2 weeks since getting sick. She has been trying to make herself eat despite having no appetite, and she was able to eat % of breakfast + Ensure this morning. Fortunately, her weight remains stable. Pt reports UBW of 121#, current weight 123# per bed scale. Age related wasting present. Pt agreeable to continue the Ensure shakes TID while inpatient. RD encouraged pt to continue supplements until her appetite has return and she is eating normally. Will continue monitoring.     Patient Vitals for the past 120 hrs:   PO Meals Eaten (%)   01/06/24 1018 76 - 100%     Wt Readings from Last 5 Encounters:   01/03/24 55.9 kg (123 lb 3.8 oz)   12/21/23 55.8 kg (123 lb)   12/05/23 54 kg (119 lb)   06/12/23 56.2 kg (124 lb)   06/12/23 56.2 kg (124 lb)   ]    Nutrition Related Findings:    Labs: Na 128.   Meds: lipitor, rocephin, roxycycline, urea.   BM 1/3.   Wound Type: None       Current Nutrition Intake & Therapies:    Average Meal Intake: 51-75%, %  Average Supplements Intake: %  ADULT DIET; Regular; 1000 ml  Diet NPO Exceptions are: Ice Chips, Sips of Water with Meds  ADULT ORAL NUTRITION SUPPLEMENT; Breakfast, Lunch, Dinner; Standard High Calorie/High Protein Oral Supplement    Anthropometric Measures:  Height: 160 cm (5'  Measures:  Height: 160 cm (5' 2.99\")  Ideal Body Weight (IBW): 115 lbs (52 kg)       Current Body Weight: 55.9 kg (123 lb 3.8 oz), 107.2 % IBW. Weight Source: Bed Scale  Current BMI (kg/m2): 21.8        Weight Adjustment For: No Adjustment                 BMI Categories: Underweight (BMI less than 22) age over 65    Estimated Daily Nutrient Needs:  Energy Requirements Based On: Formula  Weight Used for Energy Requirements: Current  Energy (kcal/day): 1345 kcals (BMR x 1.3AF)  Weight Used for Protein Requirements: Current  Protein (g/day): 56-67g (1.0-1.2g/kg)  Method Used for Fluid Requirements: Other (Comment)  Fluid (ml/day): 1000mL per MD    Nutrition Diagnosis:   Inadequate protein-energy intake related to  (decreased appetite) as evidenced by poor intake prior to admission    Nutrition Interventions:   Food and/or Nutrient Delivery: Continue Current Diet, Continue Oral Nutrition Supplement  Nutrition Education/Counseling: No recommendation at this time  Coordination of Nutrition Care: Continue to monitor while inpatient       Goals:     Goals: PO intake 75% or greater, by next RD assessment       Nutrition Monitoring and Evaluation:   Behavioral-Environmental Outcomes: None Identified  Food/Nutrient Intake Outcomes: Food and Nutrient Intake, Supplement Intake  Physical Signs/Symptoms Outcomes: Biochemical Data, GI Status, Nutrition Focused Physical Findings, Weight    Discharge Planning:    Continue current diet, Continue Oral Nutrition Supplement     Adriana Ocampo RD  Contact: [phone]

## 2024-01-06 NOTE — PROGRESS NOTES
Cardiology Progress Note      1/6/2024 12:26 PM    Admit Date: 1/3/2024          Subjective: Sitting up in chair. No new c/o. Na improving          BP (!) 121/53   Pulse 63   Temp 98.1 °F (36.7 °C) (Oral)   Resp 18   Ht 1.6 m (5' 2.99\")   Wt 55.9 kg (123 lb 3.8 oz)   LMP  (LMP Unknown)   SpO2 93%   BMI 21.84 kg/m²   01/04 1901 - 01/06 0700  In: 460 [P.O.:460]  Out: 550 [Urine:550]        Objective:      Physical Exam:  VS as above  Chest CTA  Card RRR late systolic murmur    Data Review:   Labs:      Na 128  Hgb 11.6  .  Telemetry: SR R 62      Assessment:       Cough/URI/Bronchitis  Hyponatremia with sodium of 114     - No hx of CAD, stress in 2021 no ischemia  - Mitral regurgitation with echo 1/2024 with normal EF and LV size, mild AI, with mildly dilated aortic root, mitral valve prolapse with moderate possibly more mitral regurgitation, normal pulmonary artery pressures.  - hx of PAF with episode in 2021, recurrence, now on amiodarone   - HTN  - dyslipidemia  - Quit tobacco in 2012    Plan: Cont current Rx. For PATRICK Monday

## 2024-01-06 NOTE — PROGRESS NOTES
Hospitalist Progress Note    NAME:   Alicia Torres   : 1950   MRN: [id number]     Date/Time: 2024 9:06 AM  Patient PCP: Karla Canales MD    Estimated discharge date:   Barriers: TEE1/8, low Na    Assessment / Plan:    Acute hypoxic respiratory failure due to acute bronchitis  CT chest reviewed  Procal 0.11  COVID 19 and influenza A/B negative  F/u with sputum cx. Normal respiratory melissa  Will add mucinex and empiric and doxycycline  Added ceftriaxone  Patient currently on room air    Severe mitral regurgitation:  Echocardiogram reviewed.  Cardiology recommendations noted  PATRICK Monday    Severe Hyponatremia  Hold HCTZ   Reviewed urine lytes, plasma and urine osm. Suggestive of SiADH (likely due to increase cardiac pressure MVR)  1 dose of Lasix and Ure-Na given   Nephrology input appreciated  Follow-up with BMP   Sodium improved to 128 today  Continue with UReNA daily  Fluid restriction       Hypomagnesemia  Hypokalemia  Will monitor and supplement as needed     Incidental finding of abnormality infeioriorly in the LL of the liver  Chronic mild elevation of AST 64  Will need 3 phase CT of liver for further evaluation when pt is improve from acute illness.  Can have this done with PCP outpt.      Hx of PAF  Hx of MR  HTN  Resume home medications except Benicar-HCTZ     Medical Decision Making:  I personally reviewed labs: cbc, bmp  I personally reviewed imaging: CT and XR  Toxic drug monitoring: sodium        Code Status: FULL, patient would like to be full code .   DVT Prophylaxis: eliquis  Baseline: from home, independent    Subjective:     Patient seen and examined today, she feels better, weak overall but no specific symptoms so far encouraged the patient to have more oral intake and diet       Objective:     VITALS:   Last 24hrs VS reviewed since prior progress note. Most recent are:  Patient Vitals for the past 24 hrs:   BP Temp Temp src Pulse Resp SpO2   24 0814 139/63    CREATININE 0.47* 0.49* 0.48* 0.64   CALCIUM 8.1* 8.2* 8.5 9.1   MG 1.9  --  1.4* 1.6         Signed: Ashley Archer MD

## 2024-01-06 NOTE — PROGRESS NOTES
NAME: Alicia Torres        :  1950        MRN:  [id number]                    Assessment   :                                               Plan:  Hyponatremia, acute  Hypokalemia  Hypomagnesemia, improved  HTN  Afib  MVR Sodium cesar 114 on admission day 1/3/2024  Sodium 128 today  Sodium  daily trend 114 => 119/118 => 120 => 128  Potassium was 4.5    Secondary to thiazide and poor intake (primary cause) and from high ADH presumably from cardiac (mvr, afib); stay off thiazide diuretics from now on.  Perhaps thiazide diuretics to list of allergies    Urine osm 407 (c/w high ADH level - secondary to MVR?)   Urins Na -41  Continue Urena and fluid restriction    Would hold on discharge until we can get Na up to near 130       Subjective:     Chief Complaint: Generalized weakness  Seen and examined the morning around 10:29 AM.  States that she has no headaches or nausea and has been voiding well.  Had a bowel movement in the morning.  Appetite is low but she is able to eat some.  No increase in urinary output.      Review of Systems:    See HPI    Objective:     VITALS:   Last 24hrs VS reviewed since prior progress note. Most recent are:  Vitals:    24 0814   BP: 139/63   Pulse: 73   Resp: 18   Temp: 98.3 °F (36.8 °C)   SpO2: 96%       Intake/Output Summary (Last 24 hours) at 2024 1021  Last data filed at 2024 1018  Gross per 24 hour   Intake 460 ml   Output 450 ml   Net 10 ml      Telemetry Reviewed:     PHYSICAL EXAM:  Physical Exam  Constitutional:       Appearance: Normal appearance.   HENT:      Head: Normocephalic and atraumatic.   Cardiovascular:      Rate and Rhythm: Normal rate and regular rhythm.   Pulmonary:      Effort: Pulmonary effort is normal.      Breath sounds: Normal breath sounds.   Abdominal:      General: There is no distension.      Palpations: Abdomen is soft. There is no mass.   Musculoskeletal:      last 72 hours.  No components found for: \"GLPOC\"  @labua@    MEDICATIONS:  Current Facility-Administered Medications   Medication Dose Route Frequency    sodium chloride tablet 1 g  1 g Oral BID WC    amiodarone (CORDARONE) tablet 400 mg  400 mg Oral BID    atenolol (TENORMIN) tablet 50 mg  50 mg Oral Daily    doxycycline (VIBRAMYCIN) 100 mg in sodium chloride 0.9 % 100 mL IVPB (mini-bag)  100 mg IntraVENous Q12H    cefTRIAXone (ROCEPHIN) 1,000 mg in sodium chloride 0.9 % 50 mL IVPB (mini-bag)  1,000 mg IntraVENous Q24H    sodium chloride flush 0.9 % injection 5-40 mL  5-40 mL IntraVENous 2 times per day    sodium chloride flush 0.9 % injection 5-40 mL  5-40 mL IntraVENous PRN    0.9 % sodium chloride infusion   IntraVENous PRN    ondansetron (ZOFRAN-ODT) disintegrating tablet 4 mg  4 mg Oral Q8H PRN    Or    ondansetron (ZOFRAN) injection 4 mg  4 mg IntraVENous Q6H PRN    polyethylene glycol (GLYCOLAX) packet 17 g  17 g Oral Daily PRN    acetaminophen (TYLENOL) tablet 650 mg  650 mg Oral Q6H PRN    Or    acetaminophen (TYLENOL) suppository 650 mg  650 mg Rectal Q6H PRN    ipratropium 0.5 mg-albuterol 2.5 mg (DUONEB) nebulizer solution 1 Dose  1 Dose Inhalation Q4H PRN    amLODIPine (NORVASC) tablet 5 mg  5 mg Oral Daily    apixaban (ELIQUIS) tablet 5 mg  5 mg Oral BID    atorvastatin (LIPITOR) tablet 20 mg  20 mg Oral Nightly    guaiFENesin (MUCINEX) extended release tablet 600 mg  600 mg Oral BID    hydrALAZINE (APRESOLINE) injection 10 mg  10 mg IntraVENous Q6H PRN

## 2024-01-07 LAB
ALBUMIN SERPL-MCNC: 3 G/DL (ref 3.5–5)
ANION GAP SERPL CALC-SCNC: 4 MMOL/L (ref 5–15)
BUN SERPL-MCNC: 22 MG/DL (ref 6–20)
BUN/CREAT SERPL: 35 (ref 12–20)
CALCIUM SERPL-MCNC: 9.2 MG/DL (ref 8.5–10.1)
CHLORIDE SERPL-SCNC: 97 MMOL/L (ref 97–108)
CO2 SERPL-SCNC: 29 MMOL/L (ref 21–32)
CREAT SERPL-MCNC: 0.63 MG/DL (ref 0.55–1.02)
EKG DIAGNOSIS: NORMAL
EKG Q-T INTERVAL: 386 MS
EKG QRS DURATION: 84 MS
EKG QTC CALCULATION (BAZETT): 467 MS
EKG R AXIS: 25 DEGREES
EKG T AXIS: 82 DEGREES
EKG VENTRICULAR RATE: 88 BPM
ERYTHROCYTE [DISTWIDTH] IN BLOOD BY AUTOMATED COUNT: 12 % (ref 11.5–14.5)
GLUCOSE SERPL-MCNC: 125 MG/DL (ref 65–100)
HCT VFR BLD AUTO: 33.4 % (ref 35–47)
HGB BLD-MCNC: 11.4 G/DL (ref 11.5–16)
MCH RBC QN AUTO: 33.3 PG (ref 26–34)
MCHC RBC AUTO-ENTMCNC: 34.1 G/DL (ref 30–36.5)
MCV RBC AUTO: 97.7 FL (ref 80–99)
NRBC # BLD: 0 K/UL (ref 0–0.01)
NRBC BLD-RTO: 0 PER 100 WBC
PHOSPHATE SERPL-MCNC: 3.3 MG/DL (ref 2.6–4.7)
PLATELET # BLD AUTO: 238 K/UL (ref 150–400)
PMV BLD AUTO: 9.1 FL (ref 8.9–12.9)
POTASSIUM SERPL-SCNC: 3.9 MMOL/L (ref 3.5–5.1)
RBC # BLD AUTO: 3.42 M/UL (ref 3.8–5.2)
SODIUM SERPL-SCNC: 130 MMOL/L (ref 136–145)
WBC # BLD AUTO: 10.8 K/UL (ref 3.6–11)

## 2024-01-07 PROCEDURE — 2060000000 HC ICU INTERMEDIATE R&B

## 2024-01-07 PROCEDURE — 6370000000 HC RX 637 (ALT 250 FOR IP): Performed by: INTERNAL MEDICINE

## 2024-01-07 PROCEDURE — 85027 COMPLETE CBC AUTOMATED: CPT

## 2024-01-07 PROCEDURE — 2500000003 HC RX 250 WO HCPCS: Performed by: STUDENT IN AN ORGANIZED HEALTH CARE EDUCATION/TRAINING PROGRAM

## 2024-01-07 PROCEDURE — 80069 RENAL FUNCTION PANEL: CPT

## 2024-01-07 PROCEDURE — 6360000002 HC RX W HCPCS: Performed by: STUDENT IN AN ORGANIZED HEALTH CARE EDUCATION/TRAINING PROGRAM

## 2024-01-07 PROCEDURE — 2580000003 HC RX 258: Performed by: INTERNAL MEDICINE

## 2024-01-07 PROCEDURE — 2580000003 HC RX 258: Performed by: STUDENT IN AN ORGANIZED HEALTH CARE EDUCATION/TRAINING PROGRAM

## 2024-01-07 PROCEDURE — 36415 COLL VENOUS BLD VENIPUNCTURE: CPT

## 2024-01-07 RX ADMIN — ATORVASTATIN CALCIUM 20 MG: 20 TABLET, FILM COATED ORAL at 20:02

## 2024-01-07 RX ADMIN — SODIUM CHLORIDE: 9 INJECTION, SOLUTION INTRAVENOUS at 20:07

## 2024-01-07 RX ADMIN — Medication 15 G: at 08:28

## 2024-01-07 RX ADMIN — AMLODIPINE BESYLATE 5 MG: 5 TABLET ORAL at 08:28

## 2024-01-07 RX ADMIN — GUAIFENESIN 600 MG: 600 TABLET, EXTENDED RELEASE ORAL at 20:02

## 2024-01-07 RX ADMIN — CEFTRIAXONE 1000 MG: 1 INJECTION, POWDER, FOR SOLUTION INTRAMUSCULAR; INTRAVENOUS at 16:56

## 2024-01-07 RX ADMIN — SODIUM CHLORIDE: 9 INJECTION, SOLUTION INTRAVENOUS at 16:56

## 2024-01-07 RX ADMIN — DOXYCYCLINE 100 MG: 100 INJECTION, POWDER, LYOPHILIZED, FOR SOLUTION INTRAVENOUS at 08:32

## 2024-01-07 RX ADMIN — DOXYCYCLINE 100 MG: 100 INJECTION, POWDER, LYOPHILIZED, FOR SOLUTION INTRAVENOUS at 20:08

## 2024-01-07 RX ADMIN — SODIUM CHLORIDE, PRESERVATIVE FREE 10 ML: 5 INJECTION INTRAVENOUS at 08:27

## 2024-01-07 RX ADMIN — SODIUM CHLORIDE, PRESERVATIVE FREE 10 ML: 5 INJECTION INTRAVENOUS at 20:03

## 2024-01-07 RX ADMIN — APIXABAN 5 MG: 5 TABLET, FILM COATED ORAL at 20:02

## 2024-01-07 RX ADMIN — ACETAMINOPHEN 650 MG: 325 TABLET ORAL at 19:12

## 2024-01-07 RX ADMIN — APIXABAN 5 MG: 5 TABLET, FILM COATED ORAL at 08:27

## 2024-01-07 RX ADMIN — SODIUM CHLORIDE: 9 INJECTION, SOLUTION INTRAVENOUS at 08:31

## 2024-01-07 RX ADMIN — GUAIFENESIN 600 MG: 600 TABLET, EXTENDED RELEASE ORAL at 08:28

## 2024-01-07 RX ADMIN — ATENOLOL 50 MG: 25 TABLET ORAL at 08:27

## 2024-01-07 RX ADMIN — AMIODARONE HYDROCHLORIDE 400 MG: 200 TABLET ORAL at 08:28

## 2024-01-07 RX ADMIN — AMIODARONE HYDROCHLORIDE 400 MG: 200 TABLET ORAL at 20:02

## 2024-01-07 ASSESSMENT — PAIN DESCRIPTION - DESCRIPTORS: DESCRIPTORS: CRAMPING

## 2024-01-07 ASSESSMENT — PAIN - FUNCTIONAL ASSESSMENT: PAIN_FUNCTIONAL_ASSESSMENT: ACTIVITIES ARE NOT PREVENTED

## 2024-01-07 ASSESSMENT — PAIN DESCRIPTION - LOCATION: LOCATION: ABDOMEN

## 2024-01-07 ASSESSMENT — PAIN DESCRIPTION - ORIENTATION: ORIENTATION: MID

## 2024-01-07 ASSESSMENT — PAIN SCALES - GENERAL: PAINLEVEL_OUTOF10: 6

## 2024-01-07 NOTE — PROGRESS NOTES
Cardiology Progress Note      1/7/2024 1:26 PM    Admit Date: 1/3/2024          Subjective: No new c/o this am. Na continues to improve          BP (!) 132/53   Pulse 64   Temp 98.2 °F (36.8 °C) (Oral)   Resp 20   Ht 1.6 m (5' 2.99\")   Wt 55.9 kg (123 lb 3.8 oz)   LMP  (LMP Unknown)   SpO2 95%   BMI 21.84 kg/m²   01/05 1901 - 01/07 0700  In: 1005.4 [P.O.:660; I.V.:45.4]  Out: 1600 [Urine:1600]        Objective:      Physical Exam:  VS as above  Chest CTA  Card RRR late systolic mur     Data Review:   Labs:    Na 130  Creat 0.6  Hgb 11.4     Telemetry: SR R 61      Assessment:       Cough/URI/Bronchitis  Hyponatremia with sodium of 114     - No hx of CAD, stress in 2021 no ischemia  - Mitral regurgitation with echo 1/2024 with normal EF and LV size, mild AI, with mildly dilated aortic root, mitral valve prolapse with moderate possibly more mitral regurgitation, normal pulmonary artery pressures.  - hx of PAF with episode in 2021, recurrence, now on amiodarone   - HTN  - dyslipidemia  - Quit tobacco in 2012    Plan: Cont current Rx, for PATRICK nirmala

## 2024-01-07 NOTE — PROGRESS NOTES
Occupational Therapy  Duplicate orders received and acknowledged.  Patient was evaluated on 1/4/2024 and has no OT needs; pt's adl performance is at her baseline.  Spoke with nurse who reports no change in pt's functional status.  Will complete the OT orders.

## 2024-01-07 NOTE — PLAN OF CARE
Problem: Discharge Planning  Goal: Discharge to home or other facility with appropriate resources  1/6/2024 1917 by Holli Zepeda RN  Outcome: Progressing  1/6/2024 0920 by Moraima Segovia RN  Outcome: Progressing     Problem: Pain  Goal: Verbalizes/displays adequate comfort level or baseline comfort level  1/6/2024 1917 by Holli Zepeda RN  Outcome: Progressing  1/6/2024 0920 by Moraima Segovia RN  Outcome: Progressing     Problem: Safety - Adult  Goal: Free from fall injury  1/6/2024 0920 by Moraima Segovia RN  Outcome: Progressing     Problem: Neurosensory - Adult  Goal: Achieves stable or improved neurological status  1/6/2024 0920 by Moraima Segovia RN  Outcome: Progressing     Problem: Respiratory - Adult  Goal: Achieves optimal ventilation and oxygenation  1/6/2024 0920 by Moraima Segovia RN  Outcome: Progressing     Problem: Cardiovascular - Adult  Goal: Maintains optimal cardiac output and hemodynamic stability  1/6/2024 0920 by Moraima Segovia RN  Outcome: Progressing     Problem: Skin/Tissue Integrity - Adult  Goal: Skin integrity remains intact  1/6/2024 0920 by Moraima Segovia RN  Outcome: Progressing     Problem: Musculoskeletal - Adult  Goal: Return mobility to safest level of function  1/6/2024 0920 by Moraima Segovia RN  Outcome: Progressing     Problem: Gastrointestinal - Adult  Goal: Minimal or absence of nausea and vomiting  1/6/2024 0920 by Moraima Segovia RN  Outcome: Progressing     Problem: Genitourinary - Adult  Goal: Absence of urinary retention  1/6/2024 0920 by Moraima Segovia RN  Outcome: Progressing     Problem: Infection - Adult  Goal: Absence of infection at discharge  1/6/2024 0920 by Moraima Segovia RN  Outcome: Progressing     Problem: Metabolic/Fluid and Electrolytes - Adult  Goal: Electrolytes maintained within normal limits  1/6/2024 0920 by Moraima Segovia RN  Outcome: Progressing     Problem: Hematologic - Adult  Goal: Maintains  hematologic stability  1/6/2024 0920 by Moraima Segovia, RN  Outcome: Progressing     Problem: Skin/Tissue Integrity  Goal: Absence of new skin breakdown  Description: 1.  Monitor for areas of redness and/or skin breakdown  2.  Assess vascular access sites hourly  3.  Every 4-6 hours minimum:  Change oxygen saturation probe site  4.  Every 4-6 hours:  If on nasal continuous positive airway pressure, respiratory therapy assess nares and determine need for appliance change or resting period.  1/6/2024 0920 by Moraima Segovia, RN  Outcome: Progressing

## 2024-01-07 NOTE — PROGRESS NOTES
NAME: Alicia Torres        :  1950        MRN:  [id number]                    Assessment   :                                               Plan:  Hyponatremia, acute  Hypokalemia  Hypomagnesemia, improved  HTN  Afib  MVR Sodium cesar 114 on admission day 1/3/2024  Sodium 130 today  Sodium  daily trend 114 => 119/118 => 120 => 128 => 130  Potassium was 3.9    Secondary to thiazide and poor intake (primary cause) and from high ADH presumably from cardiac (mvr, afib); stay off thiazide diuretics from now on.  Perhaps thiazide diuretics to list of allergies    Urine osm 407 (c/w high ADH level - secondary to MVR?)   Urins Na -41  Continue Urena and fluid restriction           Subjective:     Chief Complaint: Generalized weakness  Seen and examined the morning   No new complaints.  No headache.  No nausea or vomiting.  No pain reported  Voiding well  Review of Systems:    See HPI    Objective:     VITALS:   Last 24hrs VS reviewed since prior progress note. Most recent are:  Vitals:    24 0740   BP: 135/67   Pulse: 63   Resp: 19   Temp: 98.1 °F (36.7 °C)   SpO2: 95%       Intake/Output Summary (Last 24 hours) at 2024 1100  Last data filed at 2024 0414  Gross per 24 hour   Intake 545.42 ml   Output 1050 ml   Net -504.58 ml        Telemetry Reviewed:     PHYSICAL EXAM:  Physical Exam  Constitutional:       Appearance: Normal appearance.   HENT:      Head: Normocephalic and atraumatic.   Cardiovascular:      Rate and Rhythm: Normal rate and regular rhythm.   Pulmonary:      Effort: Pulmonary effort is normal.      Breath sounds: Normal breath sounds.   Abdominal:      General: There is no distension.      Palpations: Abdomen is soft. There is no mass.   Musculoskeletal:      Right lower leg: No edema.      Left lower leg: No edema.   Skin:     Coloration: Skin is not pale.      Findings: Bruising and lesion present. No

## 2024-01-08 ENCOUNTER — HOSPITAL ENCOUNTER (INPATIENT)
Facility: HOSPITAL | Age: 74
Discharge: HOME | DRG: 202 | End: 2024-01-10
Attending: INTERNAL MEDICINE
Payer: MEDICARE

## 2024-01-08 VITALS
HEART RATE: 59 BPM | OXYGEN SATURATION: 93 % | SYSTOLIC BLOOD PRESSURE: 131 MMHG | RESPIRATION RATE: 14 BRPM | DIASTOLIC BLOOD PRESSURE: 61 MMHG

## 2024-01-08 LAB
ALBUMIN SERPL-MCNC: 2.9 G/DL (ref 3.5–5)
ANION GAP SERPL CALC-SCNC: 4 MMOL/L (ref 5–15)
BUN SERPL-MCNC: 20 MG/DL (ref 6–20)
BUN/CREAT SERPL: 34 (ref 12–20)
CALCIUM SERPL-MCNC: 9.2 MG/DL (ref 8.5–10.1)
CHLORIDE SERPL-SCNC: 101 MMOL/L (ref 97–108)
CO2 SERPL-SCNC: 29 MMOL/L (ref 21–32)
CREAT SERPL-MCNC: 0.59 MG/DL (ref 0.55–1.02)
ECHO BSA: 1.58 M2
ECHO MV EROA PISA: 0.5 CM2
ECHO MV REGURGITANT ALIASING (NYQUIST) VELOCITY: 45 CM/S
ECHO MV REGURGITANT RADIUS PISA: 1.15 CM
ECHO MV REGURGITANT VELOCITY PISA: 6.9 M/S
ECHO MV REGURGITANT VOLUME PISA: 124.47 ML
ECHO MV REGURGITANT VTIA: 229.8 CM
ERYTHROCYTE [DISTWIDTH] IN BLOOD BY AUTOMATED COUNT: 12.2 % (ref 11.5–14.5)
GLUCOSE SERPL-MCNC: 110 MG/DL (ref 65–100)
HCT VFR BLD AUTO: 34.6 % (ref 35–47)
HGB BLD-MCNC: 11.5 G/DL (ref 11.5–16)
MCH RBC QN AUTO: 32.8 PG (ref 26–34)
MCHC RBC AUTO-ENTMCNC: 33.2 G/DL (ref 30–36.5)
MCV RBC AUTO: 98.6 FL (ref 80–99)
NRBC # BLD: 0 K/UL (ref 0–0.01)
NRBC BLD-RTO: 0 PER 100 WBC
PHOSPHATE SERPL-MCNC: 4.8 MG/DL (ref 2.6–4.7)
PLATELET # BLD AUTO: 242 K/UL (ref 150–400)
PMV BLD AUTO: 9.2 FL (ref 8.9–12.9)
POTASSIUM SERPL-SCNC: 3.8 MMOL/L (ref 3.5–5.1)
RBC # BLD AUTO: 3.51 M/UL (ref 3.8–5.2)
SODIUM SERPL-SCNC: 134 MMOL/L (ref 136–145)
WBC # BLD AUTO: 9 K/UL (ref 3.6–11)

## 2024-01-08 PROCEDURE — 36415 COLL VENOUS BLD VENIPUNCTURE: CPT

## 2024-01-08 PROCEDURE — 2580000003 HC RX 258: Performed by: INTERNAL MEDICINE

## 2024-01-08 PROCEDURE — 85027 COMPLETE CBC AUTOMATED: CPT

## 2024-01-08 PROCEDURE — 6370000000 HC RX 637 (ALT 250 FOR IP): Performed by: INTERNAL MEDICINE

## 2024-01-08 PROCEDURE — 2500000003 HC RX 250 WO HCPCS: Performed by: STUDENT IN AN ORGANIZED HEALTH CARE EDUCATION/TRAINING PROGRAM

## 2024-01-08 PROCEDURE — 2580000003 HC RX 258: Performed by: STUDENT IN AN ORGANIZED HEALTH CARE EDUCATION/TRAINING PROGRAM

## 2024-01-08 PROCEDURE — 6360000002 HC RX W HCPCS: Performed by: INTERNAL MEDICINE

## 2024-01-08 PROCEDURE — 93312 ECHO TRANSESOPHAGEAL: CPT

## 2024-01-08 PROCEDURE — 99152 MOD SED SAME PHYS/QHP 5/>YRS: CPT

## 2024-01-08 PROCEDURE — 80069 RENAL FUNCTION PANEL: CPT

## 2024-01-08 PROCEDURE — 6360000002 HC RX W HCPCS: Performed by: STUDENT IN AN ORGANIZED HEALTH CARE EDUCATION/TRAINING PROGRAM

## 2024-01-08 PROCEDURE — 99153 MOD SED SAME PHYS/QHP EA: CPT

## 2024-01-08 PROCEDURE — 2060000000 HC ICU INTERMEDIATE R&B

## 2024-01-08 RX ORDER — MIDAZOLAM HYDROCHLORIDE 1 MG/ML
INJECTION INTRAMUSCULAR; INTRAVENOUS PRN
Status: COMPLETED | OUTPATIENT
Start: 2024-01-08 | End: 2024-01-08

## 2024-01-08 RX ORDER — AMIODARONE HYDROCHLORIDE 200 MG/1
200 TABLET ORAL DAILY
Status: DISCONTINUED | OUTPATIENT
Start: 2024-01-08 | End: 2024-01-09 | Stop reason: HOSPADM

## 2024-01-08 RX ORDER — FENTANYL CITRATE 50 UG/ML
INJECTION, SOLUTION INTRAMUSCULAR; INTRAVENOUS PRN
Status: COMPLETED | OUTPATIENT
Start: 2024-01-08 | End: 2024-01-08

## 2024-01-08 RX ORDER — LIDOCAINE HYDROCHLORIDE 20 MG/ML
SOLUTION OROPHARYNGEAL PRN
Status: COMPLETED | OUTPATIENT
Start: 2024-01-08 | End: 2024-01-08

## 2024-01-08 RX ADMIN — SODIUM CHLORIDE, PRESERVATIVE FREE 10 ML: 5 INJECTION INTRAVENOUS at 11:18

## 2024-01-08 RX ADMIN — DOXYCYCLINE 100 MG: 100 INJECTION, POWDER, LYOPHILIZED, FOR SOLUTION INTRAVENOUS at 11:18

## 2024-01-08 RX ADMIN — FENTANYL CITRATE 25 MCG: 50 INJECTION, SOLUTION INTRAMUSCULAR; INTRAVENOUS at 09:43

## 2024-01-08 RX ADMIN — APIXABAN 5 MG: 5 TABLET, FILM COATED ORAL at 20:26

## 2024-01-08 RX ADMIN — ATORVASTATIN CALCIUM 20 MG: 20 TABLET, FILM COATED ORAL at 20:26

## 2024-01-08 RX ADMIN — MIDAZOLAM HYDROCHLORIDE 1 MG: 1 INJECTION, SOLUTION INTRAMUSCULAR; INTRAVENOUS at 09:48

## 2024-01-08 RX ADMIN — GUAIFENESIN 600 MG: 600 TABLET, EXTENDED RELEASE ORAL at 20:26

## 2024-01-08 RX ADMIN — FENTANYL CITRATE 25 MCG: 50 INJECTION, SOLUTION INTRAMUSCULAR; INTRAVENOUS at 09:46

## 2024-01-08 RX ADMIN — AMLODIPINE BESYLATE 5 MG: 5 TABLET ORAL at 11:48

## 2024-01-08 RX ADMIN — AMIODARONE HYDROCHLORIDE 200 MG: 200 TABLET ORAL at 12:03

## 2024-01-08 RX ADMIN — DOXYCYCLINE 100 MG: 100 INJECTION, POWDER, LYOPHILIZED, FOR SOLUTION INTRAVENOUS at 20:30

## 2024-01-08 RX ADMIN — MIDAZOLAM HYDROCHLORIDE 1 MG: 1 INJECTION, SOLUTION INTRAMUSCULAR; INTRAVENOUS at 09:46

## 2024-01-08 RX ADMIN — BENZOCAINE, BUTAMBEN, AND TETRACAINE HYDROCHLORIDE 1 SPRAY: .028; .004; .004 AEROSOL, SPRAY TOPICAL at 09:42

## 2024-01-08 RX ADMIN — GUAIFENESIN 600 MG: 600 TABLET, EXTENDED RELEASE ORAL at 11:48

## 2024-01-08 RX ADMIN — LIDOCAINE HYDROCHLORIDE 10 ML: 20 SOLUTION ORAL at 09:37

## 2024-01-08 RX ADMIN — SODIUM CHLORIDE, PRESERVATIVE FREE 10 ML: 5 INJECTION INTRAVENOUS at 20:26

## 2024-01-08 RX ADMIN — SODIUM CHLORIDE: 9 INJECTION, SOLUTION INTRAVENOUS at 18:01

## 2024-01-08 RX ADMIN — CEFTRIAXONE 1000 MG: 1 INJECTION, POWDER, FOR SOLUTION INTRAMUSCULAR; INTRAVENOUS at 18:02

## 2024-01-08 RX ADMIN — SODIUM CHLORIDE: 9 INJECTION, SOLUTION INTRAVENOUS at 11:17

## 2024-01-08 RX ADMIN — FENTANYL CITRATE 25 MCG: 50 INJECTION, SOLUTION INTRAMUSCULAR; INTRAVENOUS at 09:48

## 2024-01-08 RX ADMIN — MIDAZOLAM HYDROCHLORIDE 1 MG: 1 INJECTION, SOLUTION INTRAMUSCULAR; INTRAVENOUS at 09:44

## 2024-01-08 RX ADMIN — ATENOLOL 50 MG: 25 TABLET ORAL at 12:04

## 2024-01-08 RX ADMIN — SODIUM CHLORIDE: 9 INJECTION, SOLUTION INTRAVENOUS at 20:29

## 2024-01-08 NOTE — PROGRESS NOTES
TRANSFER - OUT REPORT:    Verbal report given to Kim (name) on Alicia Torres being transferred to [facility] (unit) for routine progression of patient care       Report consisted of patient's Situation, Background, Assessment and   Recommendations(SBAR).     Information from the following report(s) Recent Results was reviewed with the receiving nurse.    Opportunity for questions and clarification was provided.      Patient transported with:   Tech

## 2024-01-08 NOTE — PROGRESS NOTES
Physician Progress Note      PATIENT:               HUGO ALEGRIA  CSN #:                  [id number]  :                       1950  ADMIT DATE:       1/3/2024 6:43 AM  DISCH DATE:  RESPONDING  PROVIDER #:        Ashley Archer MD          QUERY TEXT:    Patient admitted with acute bronchitis, noted to have Paroxysmal atrial   fibrillation and is maintained on Eliquis. If possible, please document in   progress notes and discharge summary if you are evaluating and/or treating any   of the following:    The medical record reflects the following:  Risk Factors: Age, Sex, HTN  Clinical Indicators: Per H&P, IM PN  \"Hx of PAF\"  Treatment: Eliquis  Options provided:  -- Secondary hypercoagulable state in a patient with atrial fibrillation  -- Other - I will add my own diagnosis  -- Disagree - Not applicable / Not valid  -- Disagree - Clinically unable to determine / Unknown  -- Refer to Clinical Documentation Reviewer    PROVIDER RESPONSE TEXT:    This patient has secondary hypercoagulable state in a patient with atrial   fibrillation.    Query created by: Arlen Baker on 2024 1:58 PM      Electronically signed by:  Ashley Archer MD 2024 4:25 PM

## 2024-01-08 NOTE — PROGRESS NOTES
[facility]     Progress Note      1/8/2024 6:47 AM  NAME: Alicia Torres   MRN:  [id number]   Admit Diagnosis: Hyponatremia [E87.1]  Hypoxia [R09.02]  Acute respiratory failure with hypoxia (HCC) [J96.01]                Assessment:       Cough/URI/Bronchitis  Hyponatremia with sodium of 114     - No hx of CAD, stress in 2021 no ischemia  - Mitral regurgitation with echo 1/2024 with normal EF and LV size, mild AI, with mildly dilated aortic root, mitral valve prolapse with moderate possibly more mitral regurgitation, normal pulmonary artery pressures. PATRICK 1/2024 normal LV size and EF, posterior valve prolapse with moderate to severe MR, ERO 0.5, some pulmonary vein reversal, no sig TR.  - hx of PAF with episode in 2021, no known recurrence with negative EVR  - HTN  - dyslipidemia  - Quit tobacco in 2012  - No etoh, no drugs  , lives alone, 3 kids, Son practices internal medicine with daughter in law intensivist, retired, drives, ADLs    Cardiologist;  Tano Joseph                     Plan:           No chest pain, negative hs troponin  Euvolemic on exam, no edema, no orthopnea  Sinus, Recurrent afib 1/5/2024.    Confusing picture.  Recent cardiology appt feeling well.  Viral illness with decreased po intake with low sodium at presentation.  Concern for CHF at initial presentation.  Then with recurrent afib.    Sodium improving, currently on PO sodium, watch for CHF    Converted back to sinus on amiodarone    PATRICK 1/2024 normal LV size and EF, posterior valve prolapse with moderate to severe MR, ERO 0.5, some pulmonary vein reversal, no sig TR.      - cont eliquis  - Change to added amiodarone 200mg daily  - Decrease atenolol 50mg bid to 50mg daily  - Cont amlodipine  - holding benicar hcT, discussed with Dr. Fitzpatrick, likely add low dose furosemide at discharge  - Cont statin    Goal home Tuesday AM    Patient wishes to follow up at [facility] with Dr. Jaylen Andrade to discuss MV repair with

## 2024-01-08 NOTE — PROGRESS NOTES
no new symptoms      Objective:     VITALS:   Last 24hrs VS reviewed since prior progress note. Most recent are:  Patient Vitals for the past 24 hrs:   BP Temp Temp src Pulse Resp SpO2   01/08/24 1121 138/61 97.6 °F (36.4 °C) Oral 58 16 93 %   01/08/24 0710 (!) 150/69 97.9 °F (36.6 °C) Oral 66 18 94 %   01/08/24 0205 (!) 152/68 97.7 °F (36.5 °C) Oral 64 18 95 %   01/07/24 2300 135/65 98.2 °F (36.8 °C) Oral 63 18 95 %   01/07/24 2000 (!) 154/75 98.3 °F (36.8 °C) Oral 66 16 97 %   01/07/24 1610 (!) 141/67 98 °F (36.7 °C) Oral 64 18 95 %           Intake/Output Summary (Last 24 hours) at 1/8/2024 1510  Last data filed at 1/8/2024 0836  Gross per 24 hour   Intake 200 ml   Output 1250 ml   Net -1050 ml          I had a face to face encounter and independently examined this patient on 1/8/2024, as outlined below:  PHYSICAL EXAM:  General: Alert, cooperative  EENT:  EOMI. Anicteric sclerae.  Resp:  CTA bilaterally, no wheezing or rales.  No accessory muscle use  CV:  Regular  rhythm,  No edema  GI:  Soft, Non distended, Non tender.  +Bowel sounds  Neurologic:  Alert and oriented X 3, normal speech,   Psych:   Good insight. Not anxious nor agitated  Skin:  No rashes.  No jaundice    Reviewed most current lab test results and cultures  YES  Reviewed most current radiology test results   YES  Review and summation of old records today    NO  Reviewed patient's current orders and MAR    YES  PMH/SH reviewed - no change compared to H&P    Procedures: see electronic medical records for all procedures/Xrays and details which were not copied into this note but were reviewed prior to creation of Plan.      LABS:  I reviewed today's most current labs and imaging studies.  Pertinent labs include:  Recent Labs     01/06/24  0308 01/07/24  0351 01/08/24  0208   WBC 11.0 10.8 9.0   HGB 11.6 11.4* 11.5   HCT 33.8* 33.4* 34.6*    238 242       Recent Labs     01/06/24  0308 01/07/24  0351 01/08/24  0208   * 130* 134*   K 4.5

## 2024-01-08 NOTE — PROGRESS NOTES
Duplicate PT order received, chart reviewed. Pt was evaluated on 1/4/24 and found to be functioning at her baseline independent level and with no need for skilled intervention. Spoke with RN who reports no change in functional status. Pt is safe to mobilize in room/hallways with supervision of RN. Will complete PT order. Thank you    Parul Jackman, PT , DPT

## 2024-01-08 NOTE — PROGRESS NOTES
Pt sedated with 3 mg Versed and 75 mcg Fentanyl for PATRICK (monitored sedation from 0942 to 1019)

## 2024-01-08 NOTE — PROGRESS NOTES
Patient arrived to Non-Invasive Cardiology Lab for In Patient PATRICK Procedure. Staff introduced to patient. Patient identifiers verified with Name and Date of Birth. Procedure verified with patient. Consent forms reviewed and signed by patient or authorized representative and verified. Allergies verified. Patient informed of procedure and plan of care. Questions answered with review.     Patient on cardiac monitor, non-invasive blood pressure, SPO2 monitor. On RA. Patient is A&Ox3. Patient reports no complaints.    Patient on stretcher, in low position, with side rails up. Patient instructed to call for assistance as needed.

## 2024-01-08 NOTE — CARE COORDINATION
Transition of Care Plan:    RUR: 13  Prior Level of Functioning: Independent    Disposition: Home with HH: [facility]  Pt will go to Son's Home:   [address] [place] [id number].    Pt cell phone number is [phone]    2 PM   [facility] HH accepted with SOC date of 1/10.   Information added to AVS.     1:30 PM   CM completed chart review.   Cardiology indicated goal home Tuesday, 1/9/24.   Pt wishes to follow up at [facility] with Dr. Morrison to discuss MV Repair with Maze and MIKE ligation vs Olimpia Clip.  Cardiology asked for Echo labs to transfer images to [facility].   -CM issued 2nd IM to Pt and Dr. Peoples in the room.   -Family is interested in Home Health as transition to home.   Pt will go to Son[facility] initially at   [address] [place] [address].    After a week or so Pt will transition back to her home:   [address] [place] [id number]  Pt cell phone number is [phone]  Pt/family did not have a preference for HH FOC: CM will send out referrals and see who can help.     HH Referrals Pending   [facility]: Pending  [facility]: Pending  [facility]; Pending    If SNF or IPR: Date FOC offered:   Date FOC received:   Accepting facility:   Date authorization started with reference number:   Date authorization received and expires:     Follow up appointments: PCP: [place]:   JESSICA needed: n/a  Transportation at discharge: Family   IM/IMM Medicare/ letter given: 2nd IM 1/7/24  Is patient a  and connected with [facility]? N/a   If yes, was [place] transfer form completed and [facility] notified?   Caregiver Contact: Son: Henrique Torres: [phone]  Discharge Caregiver contacted prior to discharge? yes  Care Conference needed? N/a  Barriers to discharge: Cardiology clearance    Kalli Maxwell CM  [phone]         01/08/24 1251   Services At/After Discharge   Transition of Care Consult (CM Consult) N/A   Services At/After Discharge None    Resource Information Provided? No

## 2024-01-09 VITALS
OXYGEN SATURATION: 97 % | RESPIRATION RATE: 16 BRPM | BODY MASS INDEX: 21.84 KG/M2 | WEIGHT: 123.24 LBS | SYSTOLIC BLOOD PRESSURE: 133 MMHG | HEIGHT: 63 IN | DIASTOLIC BLOOD PRESSURE: 61 MMHG | TEMPERATURE: 98.3 F | HEART RATE: 61 BPM

## 2024-01-09 LAB
ALBUMIN SERPL-MCNC: 3.1 G/DL (ref 3.5–5)
ANION GAP SERPL CALC-SCNC: 4 MMOL/L (ref 5–15)
BUN SERPL-MCNC: 19 MG/DL (ref 6–20)
BUN/CREAT SERPL: 26 (ref 12–20)
CALCIUM SERPL-MCNC: 8.9 MG/DL (ref 8.5–10.1)
CHLORIDE SERPL-SCNC: 102 MMOL/L (ref 97–108)
CO2 SERPL-SCNC: 27 MMOL/L (ref 21–32)
CREAT SERPL-MCNC: 0.72 MG/DL (ref 0.55–1.02)
ERYTHROCYTE [DISTWIDTH] IN BLOOD BY AUTOMATED COUNT: 12 % (ref 11.5–14.5)
GLUCOSE SERPL-MCNC: 108 MG/DL (ref 65–100)
HCT VFR BLD AUTO: 34.4 % (ref 35–47)
HGB BLD-MCNC: 11.6 G/DL (ref 11.5–16)
MCH RBC QN AUTO: 33 PG (ref 26–34)
MCHC RBC AUTO-ENTMCNC: 33.7 G/DL (ref 30–36.5)
MCV RBC AUTO: 97.7 FL (ref 80–99)
NRBC # BLD: 0 K/UL (ref 0–0.01)
NRBC BLD-RTO: 0 PER 100 WBC
PHOSPHATE SERPL-MCNC: 4.8 MG/DL (ref 2.6–4.7)
PLATELET # BLD AUTO: 229 K/UL (ref 150–400)
PMV BLD AUTO: 9.1 FL (ref 8.9–12.9)
POTASSIUM SERPL-SCNC: 4 MMOL/L (ref 3.5–5.1)
RBC # BLD AUTO: 3.52 M/UL (ref 3.8–5.2)
SODIUM SERPL-SCNC: 133 MMOL/L (ref 136–145)
WBC # BLD AUTO: 10.2 K/UL (ref 3.6–11)

## 2024-01-09 PROCEDURE — 6370000000 HC RX 637 (ALT 250 FOR IP): Performed by: INTERNAL MEDICINE

## 2024-01-09 PROCEDURE — 2500000003 HC RX 250 WO HCPCS: Performed by: STUDENT IN AN ORGANIZED HEALTH CARE EDUCATION/TRAINING PROGRAM

## 2024-01-09 PROCEDURE — 80048 BASIC METABOLIC PNL TOTAL CA: CPT

## 2024-01-09 PROCEDURE — 36415 COLL VENOUS BLD VENIPUNCTURE: CPT

## 2024-01-09 PROCEDURE — 2580000003 HC RX 258: Performed by: STUDENT IN AN ORGANIZED HEALTH CARE EDUCATION/TRAINING PROGRAM

## 2024-01-09 PROCEDURE — 84100 ASSAY OF PHOSPHORUS: CPT

## 2024-01-09 PROCEDURE — 85027 COMPLETE CBC AUTOMATED: CPT

## 2024-01-09 PROCEDURE — 2580000003 HC RX 258: Performed by: INTERNAL MEDICINE

## 2024-01-09 PROCEDURE — 82040 ASSAY OF SERUM ALBUMIN: CPT

## 2024-01-09 RX ORDER — AMIODARONE HYDROCHLORIDE 200 MG/1
200 TABLET ORAL ONCE
Status: COMPLETED | OUTPATIENT
Start: 2024-01-09 | End: 2024-01-09

## 2024-01-09 RX ORDER — AMIODARONE HYDROCHLORIDE 200 MG/1
200 TABLET ORAL DAILY
Qty: 30 TABLET | Refills: 11 | Status: SHIPPED | OUTPATIENT
Start: 2024-01-09

## 2024-01-09 RX ORDER — FUROSEMIDE 20 MG/1
20 TABLET ORAL DAILY PRN
Qty: 90 TABLET | Refills: 1 | Status: SHIPPED | OUTPATIENT
Start: 2024-01-09

## 2024-01-09 RX ORDER — ATORVASTATIN CALCIUM 20 MG/1
20 TABLET, FILM COATED ORAL NIGHTLY
Qty: 30 TABLET | Refills: 11 | Status: SHIPPED | OUTPATIENT
Start: 2024-01-09 | End: 2024-01-17 | Stop reason: ALTCHOICE

## 2024-01-09 RX ORDER — DOXYCYCLINE HYCLATE 100 MG
100 TABLET ORAL 2 TIMES DAILY
Qty: 10 TABLET | Refills: 0 | Status: SHIPPED | OUTPATIENT
Start: 2024-01-09 | End: 2024-01-14

## 2024-01-09 RX ORDER — ATENOLOL 50 MG/1
50 TABLET ORAL DAILY
Qty: 90 TABLET | Refills: 3 | Status: SHIPPED
Start: 2024-01-09

## 2024-01-09 RX ADMIN — AMLODIPINE BESYLATE 5 MG: 5 TABLET ORAL at 09:03

## 2024-01-09 RX ADMIN — GUAIFENESIN 600 MG: 600 TABLET, EXTENDED RELEASE ORAL at 09:03

## 2024-01-09 RX ADMIN — SODIUM CHLORIDE, PRESERVATIVE FREE 10 ML: 5 INJECTION INTRAVENOUS at 09:04

## 2024-01-09 RX ADMIN — AMIODARONE HYDROCHLORIDE 200 MG: 200 TABLET ORAL at 09:03

## 2024-01-09 RX ADMIN — APIXABAN 5 MG: 5 TABLET, FILM COATED ORAL at 09:03

## 2024-01-09 RX ADMIN — ATENOLOL 50 MG: 25 TABLET ORAL at 09:03

## 2024-01-09 RX ADMIN — SODIUM CHLORIDE: 9 INJECTION, SOLUTION INTRAVENOUS at 09:15

## 2024-01-09 NOTE — DISCHARGE SUMMARY
Hospitalist Discharge Summary     Patient ID:  Alicia Torres  [id number]  74 y.o.  1950  1/3/2024    PCP on record: Karla Canales MD    Admit date: 1/3/2024  Discharge date and time: 1/9/2024    DISCHARGE DIAGNOSIS:    AHRF due to bronchitis  Mild mitral regurg  Severe hyponatremia  Hypomagnesemia  Hypokalemia  Liver lesion  History of PAF  History of MR  HTN      CONSULTATIONS:  IP CONSULT TO NEPHROLOGY  IP CONSULT TO PALLIATIVE CARE  IP CONSULT TO CARDIOLOGY  IP CONSULT TO PHYSICAL THERAPY  IP CONSULT TO OCCUPATIONAL THERAPY  IP CONSULT TO DIETITIAN  IP CONSULT HOME HEALTH    Excerpted HPI from H&P of Suyapa Royal MD:    Alicia Torres is a 74 y.o.  female with PMHx significant for PAF, HTN, HLD, presents to the ER for evaluation of flu like symptoms.  Patient had congestion, productive cough and overall feeling poorly with generalized weakness, malaise for over 1 week.  She endorses poor po intake.  Denies any fever, chills, cp, palpitations, n/v/d.  This AM, due to low O2 sats at home, pt was brought to [facility] ER for evaluation.  Vitals/labs/imaging reviewed.      ______________________________________________________________________  DISCHARGE SUMMARY/HOSPITAL COURSE:  for full details see H&P, daily progress notes, labs, consult notes.     Patient admitted for severe hyponatremia, likely due to overstretched heart and ADH overproduction, hyponatremia treated with fluid restriction and 2 days of UreNA, patient Lasix as needed sent to the patient pharmacy for symptoms treatment sodium been stable in the last 2 days, regarding her mitral valve regurgitation PATRICK done and patient will follow at [facility] for mitral valve repair patient also found to be on A-fib controlled amiodarone added to the patient meds and continue home Eliquis for AC, patient also to start on statin, doxycycline for another 5 days for bronchitis, stop losartan and

## 2024-01-09 NOTE — PROGRESS NOTES
1930:   End of Shift Note    Bedside shift change report given to VINCE De La Garza (oncoming nurse) by Moraima Segovia RN (offgoing nurse).  Report included the following information SBAR, Intake/Output, MAR, and Recent Results    Shift worked:  [id number]     Shift summary and any significant changes:     Patient went for a PATRICK today which showed mitral prolapse and severe regurgitation. Patient will probably go to [facility] in a couple of weeks for a valve replacement per daughter-in-law. Urea packet discontinued this morning. Probable discharge tomorrow.      Concerns for physician to address:       Zone phone for oncoming shift:          Activity:     Number times ambulated in hallways past shift: 0  Number of times OOB to chair past shift: 2    Cardiac:   Cardiac Monitoring: Yes           Access:  Current line(s): PIV     Genitourinary:   Urinary status: voiding    Respiratory:      Chronic home O2 use?: NO  Incentive spirometer at bedside: YES       GI:     Current diet:  ADULT DIET; Regular; Low Fat/Low Chol/High Fiber/2 gm Na  ADULT ORAL NUTRITION SUPPLEMENT; Lunch; Standard High Calorie/High Protein Oral Supplement  Passing flatus: YES  Tolerating current diet: YES       Pain Management:   Patient states pain is manageable on current regimen: N/A    Skin:     Interventions: float heels, increase time out of bed, and nutritional support    Patient Safety:  Fall Score:    Interventions: bed/chair alarm, gripper socks, pt to call before getting OOB, and stay with me (per policy)       Length of Stay:  Expected LOS: 6  Actual LOS: 5      Moraima Segovia RN

## 2024-01-09 NOTE — PROGRESS NOTES
End of Shift Note    Bedside shift change report given to Floresita (oncoming nurse) by JOHNATHAN MORTON RN (offgoing nurse).  Report included the following information SBAR, Kardex, MAR, and Recent Results    Shift worked:  7p-7a     Shift summary and any significant changes:     Recurrent AFIB on monitor. Patient asymptomatic during episode. Currently NSR     Concerns for physician to address:  Recurrent AFIB     Zone phone for oncoming shift:          Activity:     Number times ambulated in hallways past shift: 0  Number of times OOB to chair past shift: 1    Cardiac:   Cardiac Monitoring: Yes           Access:  Current line(s): PIV     Genitourinary:   Urinary status: voiding    Respiratory:      Chronic home O2 use?: NO  Incentive spirometer at bedside: NO       GI:     Current diet:  ADULT DIET; Regular; Low Fat/Low Chol/High Fiber/2 gm Na  ADULT ORAL NUTRITION SUPPLEMENT; Lunch; Standard High Calorie/High Protein Oral Supplement  Passing flatus: YES  Tolerating current diet: YES       Pain Management:   Patient states pain is manageable on current regimen: YES    Skin:     Interventions: increase time out of bed, PT/OT consult, and limit briefs    Patient Safety:  Fall Score:    Interventions: bed/chair alarm and gripper socks       Length of Stay:  Expected LOS: 6  Actual LOS: 6      JOHNATHAN MORTON RN

## 2024-01-09 NOTE — PLAN OF CARE
Problem: Discharge Planning  Goal: Discharge to home or other facility with appropriate resources  1/8/2024 2054 by Moraima Segovia RN  Outcome: Progressing  1/8/2024 1957 by Holli Zepeda RN  Outcome: Progressing     Problem: Pain  Goal: Verbalizes/displays adequate comfort level or baseline comfort level  1/8/2024 2054 by Moraima Segovia RN  Outcome: Progressing  1/8/2024 1957 by Holli Zepeda RN  Outcome: Progressing     Problem: Safety - Adult  Goal: Free from fall injury  Outcome: Progressing     Problem: Neurosensory - Adult  Goal: Achieves stable or improved neurological status  Outcome: Progressing     Problem: Respiratory - Adult  Goal: Achieves optimal ventilation and oxygenation  Outcome: Progressing     Problem: Cardiovascular - Adult  Goal: Maintains optimal cardiac output and hemodynamic stability  Outcome: Progressing     Problem: Skin/Tissue Integrity - Adult  Goal: Skin integrity remains intact  Outcome: Progressing     Problem: Musculoskeletal - Adult  Goal: Return mobility to safest level of function  Outcome: Progressing     Problem: Gastrointestinal - Adult  Goal: Minimal or absence of nausea and vomiting  Outcome: Progressing     Problem: Genitourinary - Adult  Goal: Absence of urinary retention  Outcome: Progressing     Problem: Infection - Adult  Goal: Absence of infection at discharge  Outcome: Progressing     Problem: Metabolic/Fluid and Electrolytes - Adult  Goal: Electrolytes maintained within normal limits  Outcome: Progressing     Problem: Hematologic - Adult  Goal: Maintains hematologic stability  Outcome: Progressing     Problem: Skin/Tissue Integrity  Goal: Absence of new skin breakdown  Description: 1.  Monitor for areas of redness and/or skin breakdown  2.  Assess vascular access sites hourly  3.  Every 4-6 hours minimum:  Change oxygen saturation probe site  4.  Every 4-6 hours:  If on nasal continuous positive airway pressure, respiratory therapy assess nares

## 2024-01-09 NOTE — PROGRESS NOTES
PCP hospital follow-up transitional care appointment has been scheduled with Dr. Canales on 2/14/24 at 1115. This is the first available appt due to limited provider availability. PCP office does not offer alternate provider option for hospital follow up. Pending Pending patient discharge. Kate Valentino, Care Management Assistant

## 2024-01-09 NOTE — PROGRESS NOTES
0950: Discharge instructions completed, IV removed and cardiac monitor removed. All questions answered.

## 2024-01-09 NOTE — CARE COORDINATION
Transition of Care Plan:     RUR: 13  Prior Level of Functioning: Independent     Disposition: Home with HH: [facility]  Pt will go to Son's Home:   [address] [facility] [address].    Pt cell phone number is [phone]     9:48 AM  CM noted DC order.   SMARTER Tool given to RN  Family will be here at 10:30 AM to transport her home.     CM sent update to [facility].    MD please perfect serve CM to let me know once DC summary is completed so I can send it to HH company.      If SNF or IPR: Date FOC offered:   Date FOC received:   Accepting facility:   Date authorization started with reference number:   Date authorization received and expires:      Follow up appointments: PCP: [place]:   JESSICA needed: n/a  Transportation at discharge: Family   IM/IMM Medicare/ letter given: 2nd IM 1/7/24  Is patient a  and connected with [facility]? N/a              If yes, was [place] transfer form completed and [facility] notified?   Caregiver Contact: Son: Henrique Torres: [phone]  Discharge Caregiver contacted prior to discharge? yes  Care Conference needed? N/a  Barriers to discharge: Cardiology clearance     Kalli Maxwell CM  [phone]             01/08/24 1251   Services At/After Discharge   Transition of Care Consult (CM Consult) N/A   Services At/After Discharge None    Resource Information Provided? No   Mode of Transport at Discharge Other (see comment)   Confirm Follow Up Transport Family   Condition of Participation: Discharge Planning   The Plan for Transition of Care is related to the following treatment goals: Home   The Patient and/or Patient Representative was provided with a Choice of Provider? Patient   The Patient and/Or Patient Representative agree with the Discharge Plan? Yes   Freedom of Choice list was provided with basic dialogue that supports the patient's individualized plan of care/goals, treatment preferences, and shares the quality data associated with the

## 2024-01-09 NOTE — PROGRESS NOTES
follow up at [facility] with Dr. Jaylen Andrade to discuss MV repair with MAZE and MIKE ligation vs. Olimpia clip. Have asked echo labs to transfer images to [facility].                  [x]       High complexity decision making was performed in this patient at high risk for decompensation with multiple organ involvement.    We discussed the expected course, resolution and complications of the diagnoses in detail.  Medication risk, benefits, costs, interactions, and alternatives were discussed as indicated.  I advised him to contact the office if his condition worsens, changes or fails to improve as anticipated.  Patient expressed understanding with the diagnoses  and plan.    Subjective:     Alicia Torres denies chest pain, +dyspnea.  Discussed with RN events overnight.     Review of Systems:    Symptom Y/N Comments  Symptom Y/N Comments   Fever/Chills N   Chest Pain N    Poor Appetite N   Edema N    Cough N   Abdominal Pain N    Sputum N   Joint Pain N    SOB/TAYLOR Y   Pruritis/Rash N    Nausea/vomit N   Tolerating PT/OT Y    Diarrhea N   Tolerating Diet Y    Constipation N   Other       Could NOT obtain due to:      Objective:      Physical Exam:    Last 24hrs VS reviewed since prior progress note. Most recent are:    /61   Pulse 61   Temp 98.3 °F (36.8 °C) (Oral)   Resp 16   Ht 1.6 m (5' 2.99\")   Wt 55.9 kg (123 lb 3.8 oz)   LMP  (LMP Unknown)   SpO2 97%   BMI 21.84 kg/m²     Intake/Output Summary (Last 24 hours) at 1/9/2024 0812  Last data filed at 1/9/2024 0251  Gross per 24 hour   Intake 200 ml   Output 600 ml   Net -400 ml          General Appearance: Well developed, elderly, alert & oriented x 3,    no acute distress.  Ears/Nose/Mouth/Throat: Hearing grossly normal.  Neck: Supple.  Chest: Lungs clear to auscultation bilaterally.  Cardiovascular: Rregular rate and rhythm, S1S2 normal, II/VI holosystolic murmur.  Abdomen: Soft, non-tender, bowel sounds are active.  Extremities: No edema bilaterally.  Skin: Warm  Q6H PRN    Or    acetaminophen (TYLENOL) suppository 650 mg  650 mg Rectal Q6H PRN    ipratropium 0.5 mg-albuterol 2.5 mg (DUONEB) nebulizer solution 1 Dose  1 Dose Inhalation Q4H PRN    amLODIPine (NORVASC) tablet 5 mg  5 mg Oral Daily    atorvastatin (LIPITOR) tablet 20 mg  20 mg Oral Nightly    guaiFENesin (MUCINEX) extended release tablet 600 mg  600 mg Oral BID    hydrALAZINE (APRESOLINE) injection 10 mg  10 mg IntraVENous Q6H PRN         Manuel Bray MD

## 2024-01-17 ENCOUNTER — OFFICE VISIT (OUTPATIENT)
Age: 74
End: 2024-01-17

## 2024-01-17 VITALS
SYSTOLIC BLOOD PRESSURE: 126 MMHG | HEART RATE: 60 BPM | RESPIRATION RATE: 16 BRPM | DIASTOLIC BLOOD PRESSURE: 82 MMHG | BODY MASS INDEX: 20.94 KG/M2 | OXYGEN SATURATION: 96 % | HEIGHT: 63 IN | TEMPERATURE: 98.5 F | WEIGHT: 118.2 LBS

## 2024-01-17 DIAGNOSIS — R53.83 OTHER FATIGUE: ICD-10-CM

## 2024-01-17 DIAGNOSIS — E87.1 HYPONATREMIA: Primary | ICD-10-CM

## 2024-01-17 DIAGNOSIS — I34.0 NONRHEUMATIC MITRAL VALVE REGURGITATION: ICD-10-CM

## 2024-01-17 DIAGNOSIS — I48.0 PAF (PAROXYSMAL ATRIAL FIBRILLATION) (HCC): ICD-10-CM

## 2024-01-17 PROBLEM — D68.69 SECONDARY HYPERCOAGULABLE STATE (HCC): Status: RESOLVED | Noted: 2023-12-21 | Resolved: 2024-01-17

## 2024-01-17 NOTE — PROGRESS NOTES
Alicia Torres (:  1950) is a 74 y.o. female,Established patient, here for evaluation of the following chief complaint(s):  Follow-Up from Hospital (Patient was admitted into [facility] from 1/3- due to dx of Hypoxia ) and Fatigue         ASSESSMENT/PLAN:  1. Hyponatremia-now  off hctz but some question of siadh as well  Repeat sodium and discussed some fluid  restriction  ? Some component due to valvular heart disease and will see ct surgery in am  -     Comprehensive Metabolic Panel; Future  2. PAF (paroxysmal atrial fibrillation) (HCC)-now on amiodarone will check tft given fatigue  3. Nonrheumatic mitral valve regurgitation  4. Other fatigue  -     TSH; Future  -     T4, Free; Future  -     CBC with Auto Differential; Future      No follow-ups on file.         Subjective   SUBJECTIVE/OBJECTIVE:  Fatigue  Associated symptoms include fatigue.     Transition of care visit.  She was hospitalized at [facility] from January 3 through .  Presented with severe fatigue was hyponatremic sodium of 118 and hypokalemic.  Somewhat hypoxic.  She was seen by nephrology and cardiology.  She had an episode of paroxysmal atrial fibrillation.  Transesophageal echo showed progression of mitral valve disease with moderate to severe regurgitation but concern that it was tending towards severe.  She had gradual improvement in her sodium levels and on discharge sodium was 133.  She has been referred to [facility] cardiothoracic surgery to discuss best management of the atrial fibrillation as well as mitral valve disease.    Since getting home she notes that she is gradually feeling more tired again.  Denies chest pain.  Denies abdominal pain.  Denies nausea or vomiting.  Denies UTI symptoms.  Denies fevers or chills.  Had lost weight in the hospital when she got home she weighed 115 pounds and reports that at home she is back up 2 pounds.  Does not have a large appetite but is able to eat without nausea.  Is

## 2024-01-18 LAB
ALBUMIN SERPL-MCNC: 3.6 G/DL (ref 3.5–5)
ALBUMIN/GLOB SERPL: 1.1 (ref 1.1–2.2)
ALP SERPL-CCNC: 49 U/L (ref 45–117)
ALT SERPL-CCNC: 33 U/L (ref 12–78)
ANION GAP SERPL CALC-SCNC: 7 MMOL/L (ref 5–15)
AST SERPL-CCNC: 37 U/L (ref 15–37)
BASOPHILS # BLD: 0.1 K/UL (ref 0–0.1)
BASOPHILS NFR BLD: 1 % (ref 0–1)
BILIRUB SERPL-MCNC: 0.4 MG/DL (ref 0.2–1)
BUN SERPL-MCNC: 15 MG/DL (ref 6–20)
BUN/CREAT SERPL: 20 (ref 12–20)
CALCIUM SERPL-MCNC: 9.2 MG/DL (ref 8.5–10.1)
CHLORIDE SERPL-SCNC: 106 MMOL/L (ref 97–108)
CO2 SERPL-SCNC: 28 MMOL/L (ref 21–32)
CREAT SERPL-MCNC: 0.75 MG/DL (ref 0.55–1.02)
DIFFERENTIAL METHOD BLD: ABNORMAL
EOSINOPHIL # BLD: 0.1 K/UL (ref 0–0.4)
EOSINOPHIL NFR BLD: 1 % (ref 0–7)
ERYTHROCYTE [DISTWIDTH] IN BLOOD BY AUTOMATED COUNT: 12.3 % (ref 11.5–14.5)
GLOBULIN SER CALC-MCNC: 3.2 G/DL (ref 2–4)
GLUCOSE SERPL-MCNC: 121 MG/DL (ref 65–100)
HCT VFR BLD AUTO: 35.3 % (ref 35–47)
HGB BLD-MCNC: 11.5 G/DL (ref 11.5–16)
IMM GRANULOCYTES # BLD AUTO: 0 K/UL (ref 0–0.04)
IMM GRANULOCYTES NFR BLD AUTO: 0 % (ref 0–0.5)
LYMPHOCYTES # BLD: 1.7 K/UL (ref 0.8–3.5)
LYMPHOCYTES NFR BLD: 26 % (ref 12–49)
MCH RBC QN AUTO: 33 PG (ref 26–34)
MCHC RBC AUTO-ENTMCNC: 32.6 G/DL (ref 30–36.5)
MCV RBC AUTO: 101.4 FL (ref 80–99)
MONOCYTES # BLD: 0.5 K/UL (ref 0–1)
MONOCYTES NFR BLD: 8 % (ref 5–13)
NEUTS SEG # BLD: 4.3 K/UL (ref 1.8–8)
NEUTS SEG NFR BLD: 64 % (ref 32–75)
NRBC # BLD: 0 K/UL (ref 0–0.01)
NRBC BLD-RTO: 0 PER 100 WBC
PLATELET # BLD AUTO: 287 K/UL (ref 150–400)
PMV BLD AUTO: 10.4 FL (ref 8.9–12.9)
POTASSIUM SERPL-SCNC: 4.2 MMOL/L (ref 3.5–5.1)
PROT SERPL-MCNC: 6.8 G/DL (ref 6.4–8.2)
RBC # BLD AUTO: 3.48 M/UL (ref 3.8–5.2)
SODIUM SERPL-SCNC: 141 MMOL/L (ref 136–145)
T4 FREE SERPL-MCNC: 1 NG/DL (ref 0.8–1.5)
TSH SERPL DL<=0.05 MIU/L-ACNC: 0.87 UIU/ML (ref 0.36–3.74)
WBC # BLD AUTO: 6.6 K/UL (ref 3.6–11)

## 2024-01-18 NOTE — PROGRESS NOTES
Physician Progress Note      PATIENT:               HUGO ALEGRIA  [facility] #:                  [id number]  :                       1950  ADMIT DATE:       1/3/2024 6:43 AM  DISCH DATE:        2024 11:57 AM  RESPONDING  PROVIDER #:        Ashley Archer MD          QUERY TEXT:    Pt admitted with sodium 114.  Pt noted to have  urine osmolality 407, serum   osmolality 239 and Ur sodium 41. If possible, please document in the progress   notes and discharge summary if you are evaluating and / or treating any of the   following:      The medical record reflects the following:  Risk Factors: hctz  Clinical Indicators:  urine osmolality 407, serum osmolality 239 and Ur sodium   41,  Treatment: holding HCTZ and fluid restriction?and sodium level improved to 133  Arlen Call RN/CDI [phone]  Options provided:  -- SIADH  -- Hyponatremia without SIADH  -- Other - I will add my own diagnosis  -- Disagree - Not applicable / Not valid  -- Disagree - Clinically unable to determine / Unknown  -- Refer to Clinical Documentation Reviewer    PROVIDER RESPONSE TEXT:    This patient has SIADH.    Query created by: Arlen Baker on 1/15/2024 9:42 AM      QUERY TEXT:    Patient admitted with bronchitis. Noted documentation of acute respiratory   failure in dcs. In order to support the diagnosis of acute respiratory   failure, please include additional clinical indicators in your documentation.    Or please document if the diagnosis of acute respiratory failure has been   ruled out after further study.    The medical record reflects the following:  Risk Factors: bronchitis  Clinical Indicators: H&P-No accessory muscle use, ED-No accessory muscle use  Treatment: doxy, mucinex  Arlen Call RN/CDI [phone]    Acute Respiratory Failure Clinical Indicators per  MS-DRG Training Guide and   Quick Reference Guide:  pO2 < 60 mmHg or SpO2 (pulse oximetry) < 91% breathing room air  pCO2 > 50 and pH < 7.35  P/F

## 2024-03-28 DIAGNOSIS — E87.1 HYPONATREMIA: Primary | ICD-10-CM

## 2024-03-28 DIAGNOSIS — I34.0 NONRHEUMATIC MITRAL VALVE REGURGITATION: ICD-10-CM

## 2024-03-28 DIAGNOSIS — Z79.01 ANTICOAGULATED: ICD-10-CM

## 2024-04-01 ENCOUNTER — TELEPHONE (OUTPATIENT)
Age: 74
End: 2024-04-01

## 2024-04-01 DIAGNOSIS — Z79.01 ANTICOAGULATED: ICD-10-CM

## 2024-04-01 DIAGNOSIS — E87.1 HYPONATREMIA: ICD-10-CM

## 2024-04-01 LAB
ANION GAP SERPL CALC-SCNC: 7 MMOL/L (ref 5–15)
BASOPHILS # BLD: 0.1 K/UL (ref 0–0.1)
BASOPHILS NFR BLD: 1 % (ref 0–1)
BUN SERPL-MCNC: 18 MG/DL (ref 6–20)
BUN/CREAT SERPL: 12 (ref 12–20)
CALCIUM SERPL-MCNC: 9.4 MG/DL (ref 8.5–10.1)
CHLORIDE SERPL-SCNC: 96 MMOL/L (ref 97–108)
CO2 SERPL-SCNC: 31 MMOL/L (ref 21–32)
CREAT SERPL-MCNC: 1.52 MG/DL (ref 0.55–1.02)
DIFFERENTIAL METHOD BLD: ABNORMAL
EOSINOPHIL # BLD: 0 K/UL (ref 0–0.4)
EOSINOPHIL NFR BLD: 0 % (ref 0–7)
ERYTHROCYTE [DISTWIDTH] IN BLOOD BY AUTOMATED COUNT: 15 % (ref 11.5–14.5)
GLUCOSE SERPL-MCNC: 123 MG/DL (ref 65–100)
HCT VFR BLD AUTO: 28.3 % (ref 35–47)
HGB BLD-MCNC: 8.9 G/DL (ref 11.5–16)
IMM GRANULOCYTES # BLD AUTO: 0.1 K/UL (ref 0–0.04)
IMM GRANULOCYTES NFR BLD AUTO: 1 % (ref 0–0.5)
LYMPHOCYTES # BLD: 1.3 K/UL (ref 0.8–3.5)
LYMPHOCYTES NFR BLD: 12 % (ref 12–49)
MCH RBC QN AUTO: 31.4 PG (ref 26–34)
MCHC RBC AUTO-ENTMCNC: 31.4 G/DL (ref 30–36.5)
MCV RBC AUTO: 100 FL (ref 80–99)
MONOCYTES # BLD: 1.1 K/UL (ref 0–1)
MONOCYTES NFR BLD: 11 % (ref 5–13)
NEUTS SEG # BLD: 7.7 K/UL (ref 1.8–8)
NEUTS SEG NFR BLD: 75 % (ref 32–75)
NRBC # BLD: 0 K/UL (ref 0–0.01)
NRBC BLD-RTO: 0 PER 100 WBC
PLATELET # BLD AUTO: 399 K/UL (ref 150–400)
PMV BLD AUTO: 9.2 FL (ref 8.9–12.9)
POTASSIUM SERPL-SCNC: 4.1 MMOL/L (ref 3.5–5.1)
RBC # BLD AUTO: 2.83 M/UL (ref 3.8–5.2)
SODIUM SERPL-SCNC: 134 MMOL/L (ref 136–145)
WBC # BLD AUTO: 10.2 K/UL (ref 3.6–11)

## 2024-04-01 NOTE — TELEPHONE ENCOUNTER
I called to discuss significant increase in cr from 0.7 to 1.52-on lasix 60 mg bid and not edematous and has lost weight since discharge  Will hold lasix and recheck on 4/5 and follow weights

## 2024-04-03 ENCOUNTER — HOSPITAL ENCOUNTER (OUTPATIENT)
Facility: HOSPITAL | Age: 74
Discharge: HOME | End: 2024-04-06
Payer: MEDICARE

## 2024-04-03 DIAGNOSIS — J90 PLEURAL EFFUSION: Primary | ICD-10-CM

## 2024-04-03 DIAGNOSIS — J90 PLEURAL EFFUSION: ICD-10-CM

## 2024-04-03 PROCEDURE — 71046 X-RAY EXAM CHEST 2 VIEWS: CPT

## 2024-04-04 SDOH — ECONOMIC STABILITY: FOOD INSECURITY: WITHIN THE PAST 12 MONTHS, THE FOOD YOU BOUGHT JUST DIDN'T LAST AND YOU DIDN'T HAVE MONEY TO GET MORE.: NEVER TRUE

## 2024-04-04 SDOH — ECONOMIC STABILITY: TRANSPORTATION INSECURITY
IN THE PAST 12 MONTHS, HAS LACK OF TRANSPORTATION KEPT YOU FROM MEETINGS, WORK, OR FROM GETTING THINGS NEEDED FOR DAILY LIVING?: NO

## 2024-04-04 SDOH — ECONOMIC STABILITY: HOUSING INSECURITY
IN THE LAST 12 MONTHS, WAS THERE A TIME WHEN YOU DID NOT HAVE A STEADY PLACE TO SLEEP OR SLEPT IN A SHELTER (INCLUDING NOW)?: NO

## 2024-04-04 SDOH — ECONOMIC STABILITY: INCOME INSECURITY: HOW HARD IS IT FOR YOU TO PAY FOR THE VERY BASICS LIKE FOOD, HOUSING, MEDICAL CARE, AND HEATING?: NOT HARD AT ALL

## 2024-04-04 SDOH — ECONOMIC STABILITY: FOOD INSECURITY: WITHIN THE PAST 12 MONTHS, YOU WORRIED THAT YOUR FOOD WOULD RUN OUT BEFORE YOU GOT MONEY TO BUY MORE.: NEVER TRUE

## 2024-04-05 ENCOUNTER — OFFICE VISIT (OUTPATIENT)
Age: 74
End: 2024-04-05

## 2024-04-05 VITALS
DIASTOLIC BLOOD PRESSURE: 82 MMHG | HEIGHT: 63 IN | WEIGHT: 118.8 LBS | SYSTOLIC BLOOD PRESSURE: 128 MMHG | RESPIRATION RATE: 16 BRPM | BODY MASS INDEX: 21.05 KG/M2 | OXYGEN SATURATION: 99 % | HEART RATE: 73 BPM

## 2024-04-05 DIAGNOSIS — J90 PLEURAL EFFUSION, RIGHT: ICD-10-CM

## 2024-04-05 DIAGNOSIS — D64.9 NORMOCYTIC ANEMIA: ICD-10-CM

## 2024-04-05 DIAGNOSIS — M54.2 NECK PAIN ON RIGHT SIDE: Primary | ICD-10-CM

## 2024-04-05 DIAGNOSIS — I34.0 NONRHEUMATIC MITRAL VALVE REGURGITATION: ICD-10-CM

## 2024-04-05 DIAGNOSIS — N17.9 AKI (ACUTE KIDNEY INJURY) (HCC): ICD-10-CM

## 2024-04-05 DIAGNOSIS — R00.1 JUNCTIONAL BRADYCARDIA: ICD-10-CM

## 2024-04-05 LAB
ALBUMIN SERPL-MCNC: 3.5 G/DL (ref 3.5–5)
ALBUMIN/GLOB SERPL: 1.1 (ref 1.1–2.2)
ALP SERPL-CCNC: 85 U/L (ref 45–117)
ALT SERPL-CCNC: 27 U/L (ref 12–78)
ANION GAP SERPL CALC-SCNC: 11 MMOL/L (ref 5–15)
AST SERPL-CCNC: 25 U/L (ref 15–37)
BILIRUB SERPL-MCNC: 0.5 MG/DL (ref 0.2–1)
BUN SERPL-MCNC: 13 MG/DL (ref 6–20)
BUN/CREAT SERPL: 10 (ref 12–20)
CALCIUM SERPL-MCNC: 9.3 MG/DL (ref 8.5–10.1)
CHLORIDE SERPL-SCNC: 95 MMOL/L (ref 97–108)
CO2 SERPL-SCNC: 25 MMOL/L (ref 21–32)
CREAT SERPL-MCNC: 1.36 MG/DL (ref 0.55–1.02)
ERYTHROCYTE [DISTWIDTH] IN BLOOD BY AUTOMATED COUNT: 14.8 % (ref 11.5–14.5)
GLOBULIN SER CALC-MCNC: 3.1 G/DL (ref 2–4)
GLUCOSE SERPL-MCNC: 120 MG/DL (ref 65–100)
HCT VFR BLD AUTO: 26.7 % (ref 35–47)
HGB BLD-MCNC: 8.5 G/DL (ref 11.5–16)
MAGNESIUM SERPL-MCNC: 1.6 MG/DL (ref 1.6–2.4)
MCH RBC QN AUTO: 31 PG (ref 26–34)
MCHC RBC AUTO-ENTMCNC: 31.8 G/DL (ref 30–36.5)
MCV RBC AUTO: 97.4 FL (ref 80–99)
NRBC # BLD: 0 K/UL (ref 0–0.01)
NRBC BLD-RTO: 0 PER 100 WBC
PLATELET # BLD AUTO: 365 K/UL (ref 150–400)
PMV BLD AUTO: 8.9 FL (ref 8.9–12.9)
POTASSIUM SERPL-SCNC: 4.3 MMOL/L (ref 3.5–5.1)
PROT SERPL-MCNC: 6.6 G/DL (ref 6.4–8.2)
RBC # BLD AUTO: 2.74 M/UL (ref 3.8–5.2)
SODIUM SERPL-SCNC: 131 MMOL/L (ref 136–145)
WBC # BLD AUTO: 8 K/UL (ref 3.6–11)

## 2024-04-05 RX ORDER — ASPIRIN 81 MG/1
81 TABLET ORAL DAILY
COMMUNITY

## 2024-04-05 RX ORDER — METHOCARBAMOL 750 MG/1
750 TABLET, FILM COATED ORAL AS NEEDED
COMMUNITY

## 2024-04-05 RX ORDER — OXYCODONE HYDROCHLORIDE 5 MG/1
TABLET ORAL
COMMUNITY
Start: 2024-03-28

## 2024-04-05 RX ORDER — ATORVASTATIN CALCIUM 20 MG/1
20 TABLET, FILM COATED ORAL DAILY
COMMUNITY

## 2024-04-05 ASSESSMENT — PATIENT HEALTH QUESTIONNAIRE - PHQ9
SUM OF ALL RESPONSES TO PHQ QUESTIONS 1-9: 0
2. FEELING DOWN, DEPRESSED OR HOPELESS: NOT AT ALL
SUM OF ALL RESPONSES TO PHQ9 QUESTIONS 1 & 2: 0
1. LITTLE INTEREST OR PLEASURE IN DOING THINGS: NOT AT ALL
SUM OF ALL RESPONSES TO PHQ QUESTIONS 1-9: 0

## 2024-04-05 ASSESSMENT — ENCOUNTER SYMPTOMS
ABDOMINAL PAIN: 0
SHORTNESS OF BREATH: 0
CONSTIPATION: 0

## 2024-04-05 NOTE — PROGRESS NOTES
Alicia Torres (:  1950) is a 74 y.o. female,Established patient, here for evaluation of the following chief complaint(s):  Follow-Up from Hospital ([facility] 3/18 due to open heart surgery )         ASSESSMENT/PLAN:  1. Neck pain on right side-suspect muscular with recent hospital stay and decrease in activity-cxr did not show significant pleural effusion just small effusions present   Reassured ok to use right arm as pacemaker is on left side of chest it is left arm which  she should avoid lifting over head  Continue tylenol and methocarbamol prn at night and starts with physical therapy today   2. EWA (acute kidney injury) (HCC)-cr 0.8 at discharge and up to 1.52 on Monday so we have been holding the lasix -today no edema or signs of volume overload and bp is good-continue off lasix for now check cr and  sees cardiology next week  -     Comprehensive Metabolic Panel; Future  -     Magnesium; Future  3. Normocytic anemia-post op stable was 8.8 on discharge and 8.9 this week no sxs of  active bleed on eliquis and aspirin  -     CBC; Future  4. Nonrheumatic mitral valve regurgitation-sp valvuloplasty with ring repair and seems to be doing well no cp no sob  5. Pleural effusion, right-sp  chest tube for  drainage-cxr this week showed small bilateral effusions  6. Junctional bradycardia-sp pacemaker for check next week  See me in 2 months sooner prn  Discussed increase in activity and increase in walking during the day  Will  start with pt today      No follow-ups on file.         Subjective   SUBJECTIVE/OBJECTIVE:  HPI  Transition of care visit she was hospitalized at [facility] from  through  for severe mitral regurg.  Under the service of Dr. Hernandez.    1.  Heart disease underwent valvuloplasty with ring repair as well as repair of an ASD and closure of the left atrial appendage.  PATRICK postop showed closure of a patent foramen ovale unsuccessful surgical repair.    Postop she did have a

## 2024-04-08 DIAGNOSIS — N17.9 AKI (ACUTE KIDNEY INJURY) (HCC): Primary | ICD-10-CM

## 2024-04-08 DIAGNOSIS — D64.9 NORMOCYTIC ANEMIA: ICD-10-CM

## 2024-04-19 DIAGNOSIS — N17.9 AKI (ACUTE KIDNEY INJURY) (HCC): ICD-10-CM

## 2024-04-19 DIAGNOSIS — D64.9 NORMOCYTIC ANEMIA: ICD-10-CM

## 2024-04-19 LAB
ALBUMIN SERPL-MCNC: 3.4 G/DL (ref 3.5–5)
ALBUMIN/GLOB SERPL: 1.1 (ref 1.1–2.2)
ALP SERPL-CCNC: 87 U/L (ref 45–117)
ALT SERPL-CCNC: 26 U/L (ref 12–78)
ANION GAP SERPL CALC-SCNC: 5 MMOL/L (ref 5–15)
AST SERPL-CCNC: 29 U/L (ref 15–37)
BILIRUB SERPL-MCNC: 0.4 MG/DL (ref 0.2–1)
BUN SERPL-MCNC: 14 MG/DL (ref 6–20)
BUN/CREAT SERPL: 13 (ref 12–20)
CALCIUM SERPL-MCNC: 9.3 MG/DL (ref 8.5–10.1)
CHLORIDE SERPL-SCNC: 102 MMOL/L (ref 97–108)
CO2 SERPL-SCNC: 27 MMOL/L (ref 21–32)
CREAT SERPL-MCNC: 1.04 MG/DL (ref 0.55–1.02)
ERYTHROCYTE [DISTWIDTH] IN BLOOD BY AUTOMATED COUNT: 15.2 % (ref 11.5–14.5)
GLOBULIN SER CALC-MCNC: 3 G/DL (ref 2–4)
GLUCOSE SERPL-MCNC: 117 MG/DL (ref 65–100)
HCT VFR BLD AUTO: 29 % (ref 35–47)
HGB BLD-MCNC: 8.9 G/DL (ref 11.5–16)
MCH RBC QN AUTO: 30.4 PG (ref 26–34)
MCHC RBC AUTO-ENTMCNC: 30.7 G/DL (ref 30–36.5)
MCV RBC AUTO: 99 FL (ref 80–99)
NRBC # BLD: 0 K/UL (ref 0–0.01)
NRBC BLD-RTO: 0 PER 100 WBC
PLATELET # BLD AUTO: 216 K/UL (ref 150–400)
PMV BLD AUTO: 8.9 FL (ref 8.9–12.9)
POTASSIUM SERPL-SCNC: 4.6 MMOL/L (ref 3.5–5.1)
PROT SERPL-MCNC: 6.4 G/DL (ref 6.4–8.2)
RBC # BLD AUTO: 2.93 M/UL (ref 3.8–5.2)
SODIUM SERPL-SCNC: 134 MMOL/L (ref 136–145)
WBC # BLD AUTO: 8.2 K/UL (ref 3.6–11)

## 2024-04-20 ENCOUNTER — TELEPHONE (OUTPATIENT)
Age: 74
End: 2024-04-20

## 2024-04-28 ENCOUNTER — APPOINTMENT (OUTPATIENT)
Facility: HOSPITAL | Age: 74
DRG: 291 | End: 2024-04-28
Payer: MEDICARE

## 2024-04-28 ENCOUNTER — HOSPITAL ENCOUNTER (INPATIENT)
Facility: HOSPITAL | Age: 74
LOS: 3 days | Discharge: HOME | DRG: 291 | End: 2024-05-01
Attending: STUDENT IN AN ORGANIZED HEALTH CARE EDUCATION/TRAINING PROGRAM | Admitting: HOSPITALIST
Payer: MEDICARE

## 2024-04-28 DIAGNOSIS — I50.9 ACUTE ON CHRONIC CONGESTIVE HEART FAILURE, UNSPECIFIED HEART FAILURE TYPE (HCC): ICD-10-CM

## 2024-04-28 DIAGNOSIS — J90 PLEURAL EFFUSION: ICD-10-CM

## 2024-04-28 DIAGNOSIS — R06.00 DYSPNEA, UNSPECIFIED TYPE: Primary | ICD-10-CM

## 2024-04-28 LAB
ALBUMIN SERPL-MCNC: 3.5 G/DL (ref 3.5–5)
ALBUMIN/GLOB SERPL: 1.2 (ref 1.1–2.2)
ALP SERPL-CCNC: 83 U/L (ref 45–117)
ALT SERPL-CCNC: 28 U/L (ref 12–78)
ANION GAP SERPL CALC-SCNC: 11 MMOL/L (ref 5–15)
AST SERPL-CCNC: 42 U/L (ref 15–37)
BASOPHILS # BLD: 0.1 K/UL (ref 0–0.1)
BASOPHILS NFR BLD: 1 % (ref 0–1)
BILIRUB SERPL-MCNC: 0.7 MG/DL (ref 0.2–1)
BUN SERPL-MCNC: 13 MG/DL (ref 6–20)
BUN/CREAT SERPL: 11 (ref 12–20)
CALCIUM SERPL-MCNC: 8.8 MG/DL (ref 8.5–10.1)
CHLORIDE SERPL-SCNC: 98 MMOL/L (ref 97–108)
CO2 SERPL-SCNC: 25 MMOL/L (ref 21–32)
COMMENT:: NORMAL
CREAT SERPL-MCNC: 1.15 MG/DL (ref 0.55–1.02)
DIFFERENTIAL METHOD BLD: ABNORMAL
EOSINOPHIL # BLD: 0 K/UL (ref 0–0.4)
EOSINOPHIL NFR BLD: 0 % (ref 0–7)
ERYTHROCYTE [DISTWIDTH] IN BLOOD BY AUTOMATED COUNT: 15 % (ref 11.5–14.5)
GLOBULIN SER CALC-MCNC: 3 G/DL (ref 2–4)
GLUCOSE SERPL-MCNC: 99 MG/DL (ref 65–100)
HCT VFR BLD AUTO: 30.4 % (ref 35–47)
HGB BLD-MCNC: 9.3 G/DL (ref 11.5–16)
IMM GRANULOCYTES # BLD AUTO: 0 K/UL (ref 0–0.04)
IMM GRANULOCYTES NFR BLD AUTO: 1 % (ref 0–0.5)
LYMPHOCYTES # BLD: 0.9 K/UL (ref 0.8–3.5)
LYMPHOCYTES NFR BLD: 11 % (ref 12–49)
MAGNESIUM SERPL-MCNC: 1.6 MG/DL (ref 1.6–2.4)
MCH RBC QN AUTO: 29.8 PG (ref 26–34)
MCHC RBC AUTO-ENTMCNC: 30.6 G/DL (ref 30–36.5)
MCV RBC AUTO: 97.4 FL (ref 80–99)
MONOCYTES # BLD: 0.7 K/UL (ref 0–1)
MONOCYTES NFR BLD: 9 % (ref 5–13)
NEUTS SEG # BLD: 6.6 K/UL (ref 1.8–8)
NEUTS SEG NFR BLD: 78 % (ref 32–75)
NRBC # BLD: 0 K/UL (ref 0–0.01)
NRBC BLD-RTO: 0 PER 100 WBC
NT PRO BNP: 2743 PG/ML (ref 0–125)
PLATELET # BLD AUTO: 255 K/UL (ref 150–400)
PMV BLD AUTO: 9.2 FL (ref 8.9–12.9)
POTASSIUM SERPL-SCNC: 5.2 MMOL/L (ref 3.5–5.1)
PROT SERPL-MCNC: 6.5 G/DL (ref 6.4–8.2)
RBC # BLD AUTO: 3.12 M/UL (ref 3.8–5.2)
SODIUM SERPL-SCNC: 134 MMOL/L (ref 136–145)
SPECIMEN HOLD: NORMAL
WBC # BLD AUTO: 8.4 K/UL (ref 3.6–11)

## 2024-04-28 PROCEDURE — 6370000000 HC RX 637 (ALT 250 FOR IP): Performed by: HOSPITALIST

## 2024-04-28 PROCEDURE — 2060000000 HC ICU INTERMEDIATE R&B

## 2024-04-28 PROCEDURE — 6360000002 HC RX W HCPCS: Performed by: HOSPITALIST

## 2024-04-28 PROCEDURE — 71250 CT THORAX DX C-: CPT

## 2024-04-28 PROCEDURE — 93005 ELECTROCARDIOGRAM TRACING: CPT | Performed by: STUDENT IN AN ORGANIZED HEALTH CARE EDUCATION/TRAINING PROGRAM

## 2024-04-28 PROCEDURE — 83735 ASSAY OF MAGNESIUM: CPT

## 2024-04-28 PROCEDURE — 85025 COMPLETE CBC W/AUTO DIFF WBC: CPT

## 2024-04-28 PROCEDURE — 99285 EMERGENCY DEPT VISIT HI MDM: CPT

## 2024-04-28 PROCEDURE — 6360000002 HC RX W HCPCS: Performed by: STUDENT IN AN ORGANIZED HEALTH CARE EDUCATION/TRAINING PROGRAM

## 2024-04-28 PROCEDURE — 36415 COLL VENOUS BLD VENIPUNCTURE: CPT

## 2024-04-28 PROCEDURE — 96374 THER/PROPH/DIAG INJ IV PUSH: CPT

## 2024-04-28 PROCEDURE — 80053 COMPREHEN METABOLIC PANEL: CPT

## 2024-04-28 PROCEDURE — 2580000003 HC RX 258: Performed by: HOSPITALIST

## 2024-04-28 PROCEDURE — 71046 X-RAY EXAM CHEST 2 VIEWS: CPT

## 2024-04-28 PROCEDURE — 83880 ASSAY OF NATRIURETIC PEPTIDE: CPT

## 2024-04-28 RX ORDER — SODIUM CHLORIDE 9 MG/ML
INJECTION, SOLUTION INTRAVENOUS PRN
Status: DISCONTINUED | OUTPATIENT
Start: 2024-04-28 | End: 2024-05-01 | Stop reason: HOSPADM

## 2024-04-28 RX ORDER — POTASSIUM CHLORIDE 7.45 MG/ML
10 INJECTION INTRAVENOUS PRN
Status: DISCONTINUED | OUTPATIENT
Start: 2024-04-28 | End: 2024-04-29

## 2024-04-28 RX ORDER — ATORVASTATIN CALCIUM 20 MG/1
20 TABLET, FILM COATED ORAL DAILY
Status: DISCONTINUED | OUTPATIENT
Start: 2024-04-29 | End: 2024-05-01 | Stop reason: HOSPADM

## 2024-04-28 RX ORDER — FUROSEMIDE 10 MG/ML
40 INJECTION INTRAMUSCULAR; INTRAVENOUS 2 TIMES DAILY
Status: DISCONTINUED | OUTPATIENT
Start: 2024-04-28 | End: 2024-04-30

## 2024-04-28 RX ORDER — ACETAMINOPHEN 325 MG/1
650 TABLET ORAL EVERY 6 HOURS PRN
Status: DISCONTINUED | OUTPATIENT
Start: 2024-04-28 | End: 2024-05-01 | Stop reason: HOSPADM

## 2024-04-28 RX ORDER — POTASSIUM CHLORIDE 750 MG/1
40 TABLET, FILM COATED, EXTENDED RELEASE ORAL PRN
Status: DISCONTINUED | OUTPATIENT
Start: 2024-04-28 | End: 2024-04-29

## 2024-04-28 RX ORDER — ONDANSETRON 4 MG/1
4 TABLET, ORALLY DISINTEGRATING ORAL EVERY 8 HOURS PRN
Status: DISCONTINUED | OUTPATIENT
Start: 2024-04-28 | End: 2024-05-01 | Stop reason: HOSPADM

## 2024-04-28 RX ORDER — MAGNESIUM SULFATE IN WATER 40 MG/ML
2000 INJECTION, SOLUTION INTRAVENOUS PRN
Status: DISCONTINUED | OUTPATIENT
Start: 2024-04-28 | End: 2024-05-01 | Stop reason: HOSPADM

## 2024-04-28 RX ORDER — ASPIRIN 81 MG/1
81 TABLET ORAL DAILY
Status: DISCONTINUED | OUTPATIENT
Start: 2024-04-29 | End: 2024-05-01 | Stop reason: HOSPADM

## 2024-04-28 RX ORDER — POLYETHYLENE GLYCOL 3350 17 G/17G
17 POWDER, FOR SOLUTION ORAL DAILY PRN
Status: DISCONTINUED | OUTPATIENT
Start: 2024-04-28 | End: 2024-05-01 | Stop reason: HOSPADM

## 2024-04-28 RX ORDER — SODIUM CHLORIDE 0.9 % (FLUSH) 0.9 %
5-40 SYRINGE (ML) INJECTION EVERY 12 HOURS SCHEDULED
Status: DISCONTINUED | OUTPATIENT
Start: 2024-04-28 | End: 2024-05-01 | Stop reason: HOSPADM

## 2024-04-28 RX ORDER — ACETAMINOPHEN 650 MG/1
650 SUPPOSITORY RECTAL EVERY 6 HOURS PRN
Status: DISCONTINUED | OUTPATIENT
Start: 2024-04-28 | End: 2024-05-01 | Stop reason: HOSPADM

## 2024-04-28 RX ORDER — ONDANSETRON 2 MG/ML
4 INJECTION INTRAMUSCULAR; INTRAVENOUS EVERY 6 HOURS PRN
Status: DISCONTINUED | OUTPATIENT
Start: 2024-04-28 | End: 2024-05-01 | Stop reason: HOSPADM

## 2024-04-28 RX ORDER — FUROSEMIDE 10 MG/ML
20 INJECTION INTRAMUSCULAR; INTRAVENOUS ONCE
Status: COMPLETED | OUTPATIENT
Start: 2024-04-28 | End: 2024-04-28

## 2024-04-28 RX ORDER — SODIUM CHLORIDE 0.9 % (FLUSH) 0.9 %
5-40 SYRINGE (ML) INJECTION PRN
Status: DISCONTINUED | OUTPATIENT
Start: 2024-04-28 | End: 2024-05-01 | Stop reason: HOSPADM

## 2024-04-28 RX ORDER — AMIODARONE HYDROCHLORIDE 200 MG/1
200 TABLET ORAL DAILY
Status: DISCONTINUED | OUTPATIENT
Start: 2024-04-28 | End: 2024-05-01 | Stop reason: HOSPADM

## 2024-04-28 RX ADMIN — APIXABAN 5 MG: 5 TABLET, FILM COATED ORAL at 20:42

## 2024-04-28 RX ADMIN — SODIUM CHLORIDE, PRESERVATIVE FREE 10 ML: 5 INJECTION INTRAVENOUS at 20:42

## 2024-04-28 RX ADMIN — AMIODARONE HYDROCHLORIDE 200 MG: 200 TABLET ORAL at 18:04

## 2024-04-28 RX ADMIN — FUROSEMIDE 40 MG: 10 INJECTION, SOLUTION INTRAMUSCULAR; INTRAVENOUS at 18:53

## 2024-04-28 RX ADMIN — FUROSEMIDE 20 MG: 10 INJECTION, SOLUTION INTRAMUSCULAR; INTRAVENOUS at 14:43

## 2024-04-28 ASSESSMENT — PAIN SCALES - GENERAL: PAINLEVEL_OUTOF10: 0

## 2024-04-28 ASSESSMENT — LIFESTYLE VARIABLES
HOW OFTEN DO YOU HAVE A DRINK CONTAINING ALCOHOL: NEVER
HOW MANY STANDARD DRINKS CONTAINING ALCOHOL DO YOU HAVE ON A TYPICAL DAY: PATIENT DOES NOT DRINK

## 2024-04-28 ASSESSMENT — PAIN - FUNCTIONAL ASSESSMENT: PAIN_FUNCTIONAL_ASSESSMENT: 0-10

## 2024-04-28 NOTE — ED PROVIDER NOTES
[facility] EMERGENCY DEPT  EMERGENCY DEPARTMENT ENCOUNTER      Pt Name: Alicia Torres  MRN: [id number]  Birthdate 1950  Date of evaluation: 4/28/2024  Provider: Sobeida Wagner DO    CHIEF COMPLAINT       Chief Complaint   Patient presents with    Shortness of Breath         HISTORY OF PRESENT ILLNESS    HPI    Alicia Torres is a 74 y.o. female with a history of hypertension, hyperlipidemia, paroxysmal atrial fibrillation on anticoagulation, status post pacemaker, mitral valve repair in March 2024 who presents to the emergency department for evaluation of shortness of breath.  Patient reports after having her cardiac surgery in March shortness of breath had began to improve until several days ago when she began to notice increased shortness of breath.  She was previously on a diuretic but it became as needed after the surgery, she did take a dose yesterday without any improvement of symptoms.  Does endorse shortness of breath worsens with exertion but continues at rest as well.  She noticed leg swelling yesterday.  Denies any chest pain.  No cough or congestion.  No other recent illness in the last 1 week.    Nursing Notes were reviewed.    REVIEW OF SYSTEMS       Review of Systems   Constitutional:  Negative for chills and fever.   HENT:  Negative for congestion and rhinorrhea.    Eyes:  Negative for discharge and redness.   Respiratory:  Positive for shortness of breath. Negative for cough.    Cardiovascular:  Positive for leg swelling. Negative for chest pain.   Gastrointestinal:  Negative for abdominal pain, diarrhea, nausea and vomiting.   Neurological:  Negative for speech difficulty.   Psychiatric/Behavioral:  Negative for agitation.            PAST MEDICAL HISTORY     Past Medical History:   Diagnosis Date    Atrial fibrillation (HCC)     Dyslipidemia     HTN, goal below 130/80 11/9/2015    Hx of bone density study 06/08/2016    Osteopenic    Hx of mammogram 07/10/2017    Negtaive           Cervical back: Normal range of motion.      Right lower leg: Edema present.      Left lower leg: Edema present.      Comments: Trace LE edema   Skin:     General: Skin is warm and dry.      Capillary Refill: Capillary refill takes less than 2 seconds.   Neurological:      General: No focal deficit present.      Mental Status: She is alert.   Psychiatric:         Mood and Affect: Mood normal.         Behavior: Behavior normal.         DIAGNOSTIC RESULTS     EKG: All EKG's are interpreted by the Emergency Department Physician who either signs or Co-signs this chart in the absence of a cardiologist.    ED Course as of 04/28/24 1436   Sun Apr 28, 2024   1257 EKG 12 Lead  ECG at 12:47 PM, interpreted by me: Atrial paced rhythm, rate 75 bpm.  Left axis deviation.  Lateral T wave inversions.  No ST elevations. [SH]      ED Course User Index  [SH] Sobeida Wagner DO       RADIOLOGY:   Non-plain film images such as CT, Ultrasound and MRI are read by the radiologist. Plain radiographic images are visualized and preliminarily interpreted by the emergency physician with the below findings:        Interpretation per the Radiologist below, if available at the time of this note:    XR CHEST (2 VW)   Final Result      Increased small right pleural effusion. Recommend followup PA and lateral chest   views in 4-6 weeks to ensure resolution.                 LABS:  Labs Reviewed   CBC WITH AUTO DIFFERENTIAL - Abnormal; Notable for the following components:       Result Value    RBC 3.12 (*)     Hemoglobin 9.3 (*)     Hematocrit 30.4 (*)     RDW 15.0 (*)     Neutrophils % 78 (*)     Lymphocytes % 11 (*)     Immature Granulocytes % 1 (*)     All other components within normal limits   COMPREHENSIVE METABOLIC PANEL - Abnormal; Notable for the following components:    Sodium 134 (*)     Potassium 5.2 (*)     Creatinine 1.15 (*)     Bun/Cre Ratio 11 (*)     Est, Glom Filt Rate 50 (*)     AST 42 (*)     All other components

## 2024-04-28 NOTE — H&P
Hospitalist Admission Note      NAME:  Alicia Torres   :  1950   MRN:  [id number]     Date/Time:  2024 3:27 PM    Patient PCP: Karla Canales MD    ________________________________________________________________________    Given the patient's current clinical presentation, I have a high level of concern for decompensation if discharged from the emergency department.  Complex decision making was performed, which includes reviewing the patient's available past medical records, laboratory results, and x-ray films.       My assessment of this patient's clinical condition and my plan of care is as follows.    Assessment / Plan:  Patient is a 74-year-old female with history of hypertension, hyperlipidemia, mitral valve prolapse, paroxysmal atrial fibrillation who recently had a mitral valve repair done at [facility] in 2024 comes to the hospital with worsening dyspnea on exertion.  Patient is admitted for IV diuresis, repeat echocardiogram and cardiology consult.    1.  Dyspnea on exertion  Probably related to the pleural effusion.  CT chest without contrast.  proBNP is elevated  Start IV Lasix and repeat echocardiogram.    2.  Paroxysmal atrial fibrillation  Patient is on atenolol, Eliquis and amiodarone.    3.  Hypertension  Discontinue Norvasc    4.  Hyperlipidemia  Patient is taking Lipitor 20 mg nightly    5.  Hyperkalemia  Holding potassium supplements if any.    6.  Hyponatremia  Probably related to fluid overload.    7.  Mitral valve prolapse  S/p mitral valve repair at [facility] on 3/18/2024.    8.  Junctional bradycardia  Patient has a pacemaker in place.        I have personally reviewed the radiographs, laboratory data in Epic and decisions and statements above are based partially on this personal interpretation.    Code Status: Full Code  DVT Prophylaxis:  On Eliquis  GI Prophylaxis: not indicated    Addendum: CT scan of the chest is reviewed.  It shows a small to moderate right pleural    Multiple Vitamin (MULTIVITAMIN PO) Take by mouth daily  Patient not taking: Reported on 4/5/2024    Provider, Historical, MD       REVIEW OF SYSTEMS:  See HPI for details  General:  negative for fever, chills, sweats, weakness, weight loss  Eyes: negative for blurred vision, eye pain, loss of vision, diplopia  Ear Nose and Throat: negative for rhinorrhea, pharyngitis, otalgia, tinnitus, speech or swallowing difficulties  Respiratory: Positive for shortness of breath  Cardiology:  negative for chest pain, palpitations, orthopnea, PND, edema, syncope   Gastrointestinal: negative for abdominal pain, N/V, dysphagia, change in bowel habits, bleeding  Genitourinary: negative for frequency, urgency, dysuria, hematuria, incontinence  Muskuloskeletal : negative for arthralgia, myalgia  Hematology: negative for easy bruising, bleeding, lymphadenopathy  Dermatological: negative for rash, ulceration, mole change, new lesion  Endocrine: negative for hot flashes or polydipsia  Neurological: negative for headache, dizziness, confusion, focal weakness, paresthesia, memory loss, gait disturbance  Psychological: negative for anxiety, depression, agitation      Objective:   VITALS:    Vitals:    04/28/24 1443   BP: (!) 149/71   Pulse:    Resp:    Temp:    SpO2:      PHYSICAL EXAM:    Physical Exam:    Gen: Well-developed, well-nourished, in no acute distress  HEENT:  Pink conjunctivae, PERRL, hearing intact to voice, moist mucous membranes  Neck: Supple, without masses, thyroid non-tender  Resp: Diminished breath sounds on the right lower lobe, crackles positive on the right side.  Card: No murmurs, normal S1, S2 without thrills, bruits or peripheral edema  Abd:  Soft, non-tender, non-distended, normoactive bowel sounds are present, no palpable organomegaly and no detectable hernias  Lymph:  No cervical or inguinal adenopathy  Musc: No cyanosis or clubbing  Skin: No rashes or ulcers, skin turgor is good  Neuro:  Cranial nerves are

## 2024-04-28 NOTE — ED TRIAGE NOTES
Pt arrives co SOB that started approx 3 days ago.   Pt was hospitalized in January for hyponatremia and has been having issues since. Daughter states valve replacement and pacer placed on 3/18  States she feels like she is more swollen today

## 2024-04-28 NOTE — ED NOTES
Report given to Carol ONEIL RN.   Will update with any changes that occur prior to transfer   53M PMH CAD w/ stent p/w DVT. Pt has 3d of L calf pain, constant, worse at night. Minimal swelling. Went to  where US shows "acute DVT originating in the L proximal superficial femoral vein and extending into the calf veins."  referred to ED. Pt has no other systemic symptoms. Flew from [facility] ~2w ago. Mild tachycardia, other vitals wnl. Exam as above. Very well appearing.   ddx: DVT. Clinically has no symptoms to suggest PE/R heart strain. Likely provoked by recent travel.   Will hydrate, basic labs/coags, reassess. Likely good candidate for PO AC.

## 2024-04-29 ENCOUNTER — APPOINTMENT (OUTPATIENT)
Facility: HOSPITAL | Age: 74
DRG: 291 | End: 2024-04-29
Attending: HOSPITALIST
Payer: MEDICARE

## 2024-04-29 PROBLEM — R06.00 DYSPNEA: Status: ACTIVE | Noted: 2024-04-29

## 2024-04-29 PROBLEM — E87.6 HYPOKALEMIA: Status: ACTIVE | Noted: 2024-04-29

## 2024-04-29 PROBLEM — N64.89 BREAST ASYMMETRY: Status: ACTIVE | Noted: 2024-04-29

## 2024-04-29 PROBLEM — I50.23 ACUTE ON CHRONIC HFREF (HEART FAILURE WITH REDUCED EJECTION FRACTION) (HCC): Status: ACTIVE | Noted: 2024-04-29

## 2024-04-29 PROBLEM — E83.42 HYPOMAGNESEMIA: Status: ACTIVE | Noted: 2024-04-29

## 2024-04-29 PROBLEM — D64.9 ANEMIA: Status: ACTIVE | Noted: 2024-04-29

## 2024-04-29 LAB
ANION GAP SERPL CALC-SCNC: 4 MMOL/L (ref 5–15)
BASOPHILS # BLD: 0.1 K/UL (ref 0–0.1)
BASOPHILS NFR BLD: 1 % (ref 0–1)
BUN SERPL-MCNC: 13 MG/DL (ref 6–20)
BUN/CREAT SERPL: 12 (ref 12–20)
CALCIUM SERPL-MCNC: 8.4 MG/DL (ref 8.5–10.1)
CHLORIDE SERPL-SCNC: 100 MMOL/L (ref 97–108)
CO2 SERPL-SCNC: 30 MMOL/L (ref 21–32)
CREAT SERPL-MCNC: 1.08 MG/DL (ref 0.55–1.02)
DIFFERENTIAL METHOD BLD: ABNORMAL
ECHO AO ASC DIAM: 3.8 CM
ECHO AO ASCENDING AORTA INDEX: 2.42 CM/M2
ECHO AO ROOT DIAM: 3.7 CM
ECHO AO ROOT INDEX: 2.36 CM/M2
ECHO AR MAX VEL PISA: 2.9 M/S
ECHO AV AREA PEAK VELOCITY: 1.5 CM2
ECHO AV AREA VTI: 1.1 CM2
ECHO AV AREA/BSA PEAK VELOCITY: 1 CM2/M2
ECHO AV AREA/BSA VTI: 0.7 CM2/M2
ECHO AV MEAN GRADIENT: 5 MMHG
ECHO AV MEAN VELOCITY: 1.1 M/S
ECHO AV PEAK GRADIENT: 9 MMHG
ECHO AV PEAK VELOCITY: 1.5 M/S
ECHO AV REGURGITANT PHT: 615.2 MILLISECOND
ECHO AV VELOCITY RATIO: 0.47
ECHO AV VTI: 35.6 CM
ECHO BSA: 1.57 M2
ECHO LA DIAMETER INDEX: 2.68 CM/M2
ECHO LA DIAMETER: 4.2 CM
ECHO LA TO AORTIC ROOT RATIO: 1.14
ECHO LA VOL A-L A2C: 43 ML (ref 22–52)
ECHO LA VOL A-L A4C: 66 ML (ref 22–52)
ECHO LA VOL BP: 53 ML (ref 22–52)
ECHO LA VOL MOD A2C: 43 ML (ref 22–52)
ECHO LA VOL MOD A4C: 60 ML (ref 22–52)
ECHO LA VOL/BSA BIPLANE: 34 ML/M2 (ref 16–34)
ECHO LA VOLUME AREA LENGTH: 57 ML
ECHO LA VOLUME INDEX A-L A2C: 27 ML/M2 (ref 16–34)
ECHO LA VOLUME INDEX A-L A4C: 42 ML/M2 (ref 16–34)
ECHO LA VOLUME INDEX AREA LENGTH: 36 ML/M2 (ref 16–34)
ECHO LA VOLUME INDEX MOD A2C: 27 ML/M2 (ref 16–34)
ECHO LA VOLUME INDEX MOD A4C: 38 ML/M2 (ref 16–34)
ECHO LV E' LATERAL VELOCITY: 12 CM/S
ECHO LV E' SEPTAL VELOCITY: 6 CM/S
ECHO LV EDV A2C: 56 ML
ECHO LV EDV A4C: 54 ML
ECHO LV EDV BP: 56 ML (ref 56–104)
ECHO LV EDV INDEX A4C: 34 ML/M2
ECHO LV EDV INDEX BP: 36 ML/M2
ECHO LV EDV NDEX A2C: 36 ML/M2
ECHO LV EJECTION FRACTION A2C: 56 %
ECHO LV EJECTION FRACTION A4C: 56 %
ECHO LV EJECTION FRACTION BIPLANE: 56 % (ref 55–100)
ECHO LV ESV A2C: 25 ML
ECHO LV ESV A4C: 24 ML
ECHO LV ESV BP: 24 ML (ref 19–49)
ECHO LV ESV INDEX A2C: 16 ML/M2
ECHO LV ESV INDEX A4C: 15 ML/M2
ECHO LV ESV INDEX BP: 15 ML/M2
ECHO LV FRACTIONAL SHORTENING: 27 % (ref 28–44)
ECHO LV INTERNAL DIMENSION DIASTOLE INDEX: 3.06 CM/M2
ECHO LV INTERNAL DIMENSION DIASTOLIC: 4.8 CM (ref 3.9–5.3)
ECHO LV INTERNAL DIMENSION SYSTOLIC INDEX: 2.23 CM/M2
ECHO LV INTERNAL DIMENSION SYSTOLIC: 3.5 CM
ECHO LV IVSD: 1 CM (ref 0.6–0.9)
ECHO LV MASS 2D: 170.2 G (ref 67–162)
ECHO LV MASS INDEX 2D: 108.4 G/M2 (ref 43–95)
ECHO LV POSTERIOR WALL DIASTOLIC: 1 CM (ref 0.6–0.9)
ECHO LV RELATIVE WALL THICKNESS RATIO: 0.42
ECHO LVOT AREA: 3.1 CM2
ECHO LVOT AV VTI INDEX: 0.37
ECHO LVOT DIAM: 2 CM
ECHO LVOT MEAN GRADIENT: 1 MMHG
ECHO LVOT PEAK GRADIENT: 2 MMHG
ECHO LVOT PEAK VELOCITY: 0.7 M/S
ECHO LVOT STROKE VOLUME INDEX: 26.6 ML/M2
ECHO LVOT SV: 41.8 ML
ECHO LVOT VTI: 13.3 CM
ECHO MV A VELOCITY: 0.49 M/S
ECHO MV E DECELERATION TIME (DT): 213.5 MS
ECHO MV E VELOCITY: 1.45 M/S
ECHO MV E/A RATIO: 2.96
ECHO MV E/E' LATERAL: 12.08
ECHO MV E/E' RATIO (AVERAGED): 18.13
ECHO PULMONARY ARTERY END DIASTOLIC PRESSURE: 3 MMHG
ECHO PV MAX VELOCITY: 0.6 M/S
ECHO PV PEAK GRADIENT: 2 MMHG
ECHO PV REGURGITANT MAX VELOCITY: 0.9 M/S
ECHO RV INTERNAL DIMENSION: 4.2 CM
ECHO RV TAPSE: 0.9 CM (ref 1.7–?)
ECHO RVOT PEAK GRADIENT: 1 MMHG
ECHO RVOT PEAK VELOCITY: 0.5 M/S
ECHO TV REGURGITANT MAX VELOCITY: 1.75 M/S
ECHO TV REGURGITANT PEAK GRADIENT: 12 MMHG
EKG ATRIAL RATE: 75 BPM
EKG DIAGNOSIS: NORMAL
EKG P-R INTERVAL: 314 MS
EKG Q-T INTERVAL: 414 MS
EKG QRS DURATION: 96 MS
EKG QTC CALCULATION (BAZETT): 462 MS
EKG R AXIS: -57 DEGREES
EKG T AXIS: 170 DEGREES
EKG VENTRICULAR RATE: 75 BPM
EOSINOPHIL # BLD: 0.1 K/UL (ref 0–0.4)
EOSINOPHIL NFR BLD: 1 % (ref 0–7)
ERYTHROCYTE [DISTWIDTH] IN BLOOD BY AUTOMATED COUNT: 14.7 % (ref 11.5–14.5)
FERRITIN SERPL-MCNC: 41 NG/ML (ref 8–252)
GLUCOSE SERPL-MCNC: 124 MG/DL (ref 65–100)
HCT VFR BLD AUTO: 27.7 % (ref 35–47)
HGB BLD-MCNC: 8.7 G/DL (ref 11.5–16)
IMM GRANULOCYTES # BLD AUTO: 0 K/UL (ref 0–0.04)
IMM GRANULOCYTES NFR BLD AUTO: 0 % (ref 0–0.5)
IRON SATN MFR SERPL: 3 % (ref 20–50)
IRON SERPL-MCNC: 14 UG/DL (ref 35–150)
LYMPHOCYTES # BLD: 1.2 K/UL (ref 0.8–3.5)
LYMPHOCYTES NFR BLD: 17 % (ref 12–49)
MAGNESIUM SERPL-MCNC: 1.5 MG/DL (ref 1.6–2.4)
MCH RBC QN AUTO: 29.7 PG (ref 26–34)
MCHC RBC AUTO-ENTMCNC: 31.4 G/DL (ref 30–36.5)
MCV RBC AUTO: 94.5 FL (ref 80–99)
MONOCYTES # BLD: 0.7 K/UL (ref 0–1)
MONOCYTES NFR BLD: 10 % (ref 5–13)
NEUTS SEG # BLD: 4.8 K/UL (ref 1.8–8)
NEUTS SEG NFR BLD: 71 % (ref 32–75)
NRBC # BLD: 0 K/UL (ref 0–0.01)
NRBC BLD-RTO: 0 PER 100 WBC
PLATELET # BLD AUTO: 236 K/UL (ref 150–400)
PMV BLD AUTO: 9 FL (ref 8.9–12.9)
POTASSIUM SERPL-SCNC: 3.3 MMOL/L (ref 3.5–5.1)
PROCALCITONIN SERPL-MCNC: <0.05 NG/ML
RBC # BLD AUTO: 2.93 M/UL (ref 3.8–5.2)
SODIUM SERPL-SCNC: 134 MMOL/L (ref 136–145)
TIBC SERPL-MCNC: 461 UG/DL (ref 250–450)
WBC # BLD AUTO: 6.8 K/UL (ref 3.6–11)

## 2024-04-29 PROCEDURE — 83550 IRON BINDING TEST: CPT

## 2024-04-29 PROCEDURE — 6360000002 HC RX W HCPCS: Performed by: HOSPITALIST

## 2024-04-29 PROCEDURE — 93010 ELECTROCARDIOGRAM REPORT: CPT | Performed by: INTERNAL MEDICINE

## 2024-04-29 PROCEDURE — 2580000003 HC RX 258: Performed by: HOSPITALIST

## 2024-04-29 PROCEDURE — 2500000003 HC RX 250 WO HCPCS: Performed by: INTERNAL MEDICINE

## 2024-04-29 PROCEDURE — 99222 1ST HOSP IP/OBS MODERATE 55: CPT | Performed by: INTERNAL MEDICINE

## 2024-04-29 PROCEDURE — 85025 COMPLETE CBC W/AUTO DIFF WBC: CPT

## 2024-04-29 PROCEDURE — 84145 PROCALCITONIN (PCT): CPT

## 2024-04-29 PROCEDURE — 6370000000 HC RX 637 (ALT 250 FOR IP): Performed by: HOSPITALIST

## 2024-04-29 PROCEDURE — 36415 COLL VENOUS BLD VENIPUNCTURE: CPT

## 2024-04-29 PROCEDURE — 2060000000 HC ICU INTERMEDIATE R&B

## 2024-04-29 PROCEDURE — 6370000000 HC RX 637 (ALT 250 FOR IP): Performed by: INTERNAL MEDICINE

## 2024-04-29 PROCEDURE — 93306 TTE W/DOPPLER COMPLETE: CPT | Performed by: INTERNAL MEDICINE

## 2024-04-29 PROCEDURE — 93306 TTE W/DOPPLER COMPLETE: CPT

## 2024-04-29 PROCEDURE — APPSS45 APP SPLIT SHARED TIME 31-45 MINUTES: Performed by: NURSE PRACTITIONER

## 2024-04-29 PROCEDURE — 83735 ASSAY OF MAGNESIUM: CPT

## 2024-04-29 PROCEDURE — 82728 ASSAY OF FERRITIN: CPT

## 2024-04-29 PROCEDURE — 83540 ASSAY OF IRON: CPT

## 2024-04-29 PROCEDURE — 80048 BASIC METABOLIC PNL TOTAL CA: CPT

## 2024-04-29 PROCEDURE — 94761 N-INVAS EAR/PLS OXIMETRY MLT: CPT

## 2024-04-29 PROCEDURE — 6360000002 HC RX W HCPCS: Performed by: INTERNAL MEDICINE

## 2024-04-29 PROCEDURE — 92610 EVALUATE SWALLOWING FUNCTION: CPT

## 2024-04-29 RX ORDER — MAGNESIUM SULFATE HEPTAHYDRATE 40 MG/ML
2000 INJECTION, SOLUTION INTRAVENOUS ONCE
Status: COMPLETED | OUTPATIENT
Start: 2024-04-29 | End: 2024-04-29

## 2024-04-29 RX ADMIN — APIXABAN 5 MG: 5 TABLET, FILM COATED ORAL at 20:34

## 2024-04-29 RX ADMIN — POTASSIUM BICARBONATE 40 MEQ: 782 TABLET, EFFERVESCENT ORAL at 12:41

## 2024-04-29 RX ADMIN — APIXABAN 5 MG: 5 TABLET, FILM COATED ORAL at 08:36

## 2024-04-29 RX ADMIN — AMIODARONE HYDROCHLORIDE 200 MG: 200 TABLET ORAL at 08:36

## 2024-04-29 RX ADMIN — ASPIRIN 81 MG: 81 TABLET, COATED ORAL at 08:36

## 2024-04-29 RX ADMIN — IRON SUCROSE 200 MG: 20 INJECTION, SOLUTION INTRAVENOUS at 12:27

## 2024-04-29 RX ADMIN — MAGNESIUM SULFATE HEPTAHYDRATE 2000 MG: 40 INJECTION, SOLUTION INTRAVENOUS at 12:44

## 2024-04-29 RX ADMIN — SODIUM CHLORIDE, PRESERVATIVE FREE 10 ML: 5 INJECTION INTRAVENOUS at 08:36

## 2024-04-29 RX ADMIN — SODIUM CHLORIDE 25 ML: 9 INJECTION, SOLUTION INTRAVENOUS at 04:06

## 2024-04-29 RX ADMIN — SODIUM CHLORIDE, PRESERVATIVE FREE 10 ML: 5 INJECTION INTRAVENOUS at 20:35

## 2024-04-29 RX ADMIN — FUROSEMIDE 40 MG: 10 INJECTION, SOLUTION INTRAMUSCULAR; INTRAVENOUS at 17:42

## 2024-04-29 RX ADMIN — MAGNESIUM SULFATE HEPTAHYDRATE 2000 MG: 40 INJECTION, SOLUTION INTRAVENOUS at 04:10

## 2024-04-29 RX ADMIN — ATORVASTATIN CALCIUM 20 MG: 20 TABLET, FILM COATED ORAL at 08:36

## 2024-04-29 RX ADMIN — FUROSEMIDE 40 MG: 10 INJECTION, SOLUTION INTRAMUSCULAR; INTRAVENOUS at 08:36

## 2024-04-29 RX ADMIN — POTASSIUM CHLORIDE 40 MEQ: 750 TABLET, FILM COATED, EXTENDED RELEASE ORAL at 04:00

## 2024-04-29 NOTE — PROGRESS NOTES
[facility]  [address] [place] [address]  [phone]      Hospitalist  Progress Note      NAME:       Alicia Torres   :        1950  MRM:        [id number]    Date of service: 2024      Subjective: Patient seen and examined by me. Patient admitted with TAYLOR. She says she still has some SOB when she moves around the room. No cough or fever. Not needing supplemental oxygen. Weak.      Objective:    Vital Signs:    /65   Pulse 76   Temp 97.4 °F (36.3 °C) (Oral)   Resp 21   Ht 1.6 m (5' 3\")   Wt 55.8 kg (123 lb)   LMP  (LMP Unknown)   SpO2 96%   BMI 21.79 kg/m²        Intake/Output Summary (Last 24 hours) at 2024 1223  Last data filed at 2024 1000  Gross per 24 hour   Intake 640 ml   Output 500 ml   Net 140 ml        Current inpatient medications reviewed:  Current Facility-Administered Medications   Medication Dose Route Frequency    iron sucrose (VENOFER) injection 200 mg  200 mg IntraVENous Q24H    amiodarone (CORDARONE) tablet 200 mg  200 mg Oral Daily    aspirin EC tablet 81 mg  81 mg Oral Daily    atorvastatin (LIPITOR) tablet 20 mg  20 mg Oral Daily    apixaban (ELIQUIS) tablet 5 mg  5 mg Oral BID    sodium chloride flush 0.9 % injection 5-40 mL  5-40 mL IntraVENous 2 times per day    sodium chloride flush 0.9 % injection 5-40 mL  5-40 mL IntraVENous PRN    0.9 % sodium chloride infusion   IntraVENous PRN    potassium chloride (KLOR-CON) extended release tablet 40 mEq  40 mEq Oral PRN    Or    potassium bicarb-citric acid (EFFER-K) effervescent tablet 40 mEq  40 mEq Oral PRN    Or    potassium chloride 10 mEq/100 mL IVPB (Peripheral Line)  10 mEq IntraVENous PRN    magnesium sulfate 2000 mg in 50 mL IVPB premix  2,000 mg IntraVENous PRN    ondansetron (ZOFRAN-ODT) disintegrating tablet 4 mg  4 mg Oral Q8H PRN    Or    ondansetron (ZOFRAN) injection 4 mg  4 mg

## 2024-04-29 NOTE — CONSULTS
Name: Alicia Torres Hospital: [facility]   : 1950 Admit Date: 2024   Phone: [phone]  Room: [place] [address]   PCP: Karla Canales MD  MRN: [id number]   Date: 2024  Code: Full Code          Chart and notes reviewed. Data reviewed. I review the patient's current medications in the medical record at each encounter.  I have evaluated and examined the patient.     HPI:    8:00 AM       History was obtained from patient.      I was asked by Denny Cueva MD to see Alicia Torres in consultation for a chief complaint of a pleural effusion.      History of Present Illness:  Ms. Torres is a 75 yo wit ha history of MVP with severe MR s/p MV valvuloplasty (3/18/24), post-op stephanie cardia s/p PPM 3/22, prior R pleural effusion post-op requiring R sided chest tube placement, HTN, HLD, and former tobacco use who is admitted with shortness of breath on exertion. Imaging showed a small/moderate R pleural effusion (as well as trace L effusion), associated compressive atelectasis, and some ggo in the RML. She describes several days of increases shortness of breath on exertion as well as some LE swelling. She take lasix PRN and took a dose yesterday without improvement in her symptoms. She has not been hypoxic. Berry sat of 91% documented and was satting 99% on RA at rest. She reports responding well to IV diuretics that have been started.    Her son describes voice changes and upper respiratory symptoms post-op. She had some vocal cord dysfunction following intubation may years ago.    UOP: 500 with one unmeasured void as well    Labs: Na 134, K 3.3 (initially 5.2), Cr 1.08, Mag 1.5, procal <0.05, proBNP 2743, WBC 6.8, Hgb 8.7,     Images reviewed:  CT chest 2024:       Past Medical History:   Diagnosis Date    Atrial fibrillation (HCC)     Dyslipidemia     HTN, goal below 130/80 2015    Hx of bone density study 2016    Osteopenic    Hx of mammogram

## 2024-04-29 NOTE — PROGRESS NOTES
1900: Bedside shift change report given to Willa RN (oncoming nurse) by Carol RN (offgoing nurse). Report included the following information Nurse Handoff Report, Adult Overview, Intake/Output, MAR, Recent Results, and Cardiac Rhythm A paced .     0700: Bedside shift change report given to Kecia RN (oncoming nurse) by Kwabena Romo RN (offgoing nurse). Report included the following information Nurse Handoff Report, Adult Overview, Intake/Output, MAR, Recent Results, and Cardiac Rhythm A paced .

## 2024-04-29 NOTE — PROGRESS NOTES
Speech LAnguage Pathology EVALUATION    Patient: Alicia Torres (74 y.o. female)  Date: 4/29/2024  Primary Diagnosis: Pleural effusion [J90]  Dyspnea, unspecified type [R06.00]  Acute on chronic congestive heart failure, unspecified heart failure type (HCC) [I50.9]       Precautions:                     ASSESSMENT :  Patient with functional oral-pharyngeal swallow.  She c/o unable to taste salt.    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  Currenlty voice with mild strain, but she can communicate wants and needs at conversational level without listener distraction.      Admitted 4-28-24 with SOB 3d, TAYLOR, with  pl effusion.   Chest CT:  4/28/24  1. Asymmetry in the appearance of the breasts in the interval with right breast  enlargement, skin thickening and increased parenchymal opacification. This  requires clinical correlation and follow-up breast imaging consultation.  2. Status post median sternotomy. Mild cardiomegaly. Mitral valve replacement.  Left atrial appendageal exclusion device. Pacemaker.  3. Ascending thoracic aortic ectasia with transverse dimension of 4.0 cm.  5. Small-moderate right pleural effusion and trace left pleural fluid.  6. Increased tree-in-bud and groundglass opacities in right middle lobe,  suggesting infectious or inflammatory cause.  7. 2.0 x 2.2 x 2.9 cm hypoattenuating lesion in the lateral segment of the left  hepatic lobe, for which work-up is recommended with dedicated CT or MR imaging  with liver protocol    PMH: a-fib, HTN,  XOL, hyponatremia, h/o tobacco.   She has MVP  with severe MK s/p MV 3/18/24 at [facility] with post op pl effusion and R chest tube,     Patient will benefit from skilled intervention to address the above impairments.     PLAN :  Recommendations and Planned Interventions:  Diet: Regular and thin liquids  Upright for all PO

## 2024-04-29 NOTE — CONSULTS
ATTENDING CARDIOLOGIST  The patient was personally examined and chart reviewed. All the elements of history and examination were personally performed and I agree with the plan as listed by advanced practice provider.    Treatment plan was addressed with the patient.      Subjective:  SOB  No results found for: \"BNP\"  Recent MV valvuloplasty + ring + MAZE + Atriclip 3/18/24 [facility]      Blood pressure 112/62, pulse 71, temperature 97.5 °F (36.4 °C), temperature source Oral, resp. rate 19, height 1.6 m (5' 3\"), weight 55.8 kg (123 lb), SpO2 96 %.  Normal rate, regular rhythm, S1/S2  Lungs clear      A/P:  SOB  - Recent MV valvuloplasty + ring + MAZE + Atriclip 3/18/24 [facility] - echo reviewed and shows normal LV fxn, mild MR sp repair.  Normal RVSP.  Given IV lasix with subtly improvement.          []    High complexity decision making was performed  []    Patient is at high-risk of decompensation with multiple organ involvement        Cielo Schuler MS, MD, [facility]        Cielo Schuler MS, MD, [facility]  [phone] (P)  [phone] (F)        [facility] CARDIOLOGY                    Cardiology Care Note     [x]Initial Encounter     []Follow-up    Patient Name: Alicia Torres - :1950 - MRN:[id number]  Primary Cardiologist: [facility]  Consulting Cardiologist: Cielo Schuler MD     Reason for encounter: CHF     HPI:       Alicia Torres is a 74 y.o. female with PMH significant for  hypertension, hyperlipidemia, mitral valve prolapse s/p mitral valve annuloplasty @ Health system 2024 radial reconstruction, with ring (32 mm Hargrove Physio II complete annuloplasty band with ring with P2 triangular resection, P1-P2 cleft closure, P2-P3 cleft closure), Operative tissue ablation and reconstruction of atria, performed at the time of other cardiac procedure. EXTENSIVE (Santana Maze IX), Exclusion of left atrial appendage concomitant, any method (35 mm Atricure Flex V AtriClip) subsequently post op

## 2024-04-29 NOTE — CARE COORDINATION
Obstetrical Discharge Summary     Name: Carina Camacho  MRN: [id number]   SSN: Lydia Rist     YOB: 1987   Age: 36 y.o. Sex: female       Allergies: No Known Allergies     Admit Date:  2022     Discharge Date:       Admitting Physician: [unfilled]     Attending Physician:  Moses Cm MD     * Admission Diagnoses:  Vaginal delivery [O80]     * Discharge Diagnoses:     Yesica Retort Girl Vanessa Coil [[id number]]       Information    Head delivery date/time: 2022 07:35:00   Changing the 's delivery date/time could affect patient care.:      Delivery date/time:  2235   Delivery type: Vaginal, Spontaneous    Details:                          Immunization History   Administered Date(s) Administered    Influenza, Quadv, IM, PF (6 mo and older Fluzone, Flulaval, Fluarix, and 3 yrs and older Afluria) 10/20/2017, 10/24/2018        * Procedures: No admission procedures for hospital encounter. * Discharge Condition: Good    [facility] Course: Normal hospital course following the delivery. * Disposition: Home    Discharge Medications:      Medication List        START taking these medications      ibuprofen 800 MG tablet  Commonly known as: ADVIL;MOTRIN  Take 1 tablet by mouth in the morning and 1 tablet at noon and 1 tablet in the evening. oxyCODONE 5 MG immediate release tablet  Commonly known as: ROXICODONE  Take 1 tablet by mouth every 4 hours as needed for Pain for up to 3 days.             CONTINUE taking these medications      ergocalciferol 1.25 MG (76930 UT) capsule  Commonly known as: ERGOCALCIFEROL               Where to Get Your Medications        These medications were sent to [address] [place] [address] Way - F [phone]  [address] [facility]      Hours: 24-hours Phone: [phone]   ibuprofen 800 MG tablet  oxyCODONE 5 MG immediate release tablet          * Follow-up Care/Patient Patient with low readmission risk score of 11%.  No anticipated CM needs.  Please consult CM should any discharge needs arise.  PT/OT evals pending.    VINCE Ashby  4/29/2024  9:14 AM     Instructions:   Activity: activity as tolerated  Diet: regular diet  Wound Care: keep wound clean and dry    Follow-up Information    None

## 2024-04-30 ENCOUNTER — APPOINTMENT (OUTPATIENT)
Facility: HOSPITAL | Age: 74
DRG: 291 | End: 2024-04-30
Payer: MEDICARE

## 2024-04-30 LAB
APPEARANCE FLD: ABNORMAL
BODY FLD TYPE: NORMAL
COLOR FLD: ABNORMAL
LDH FLD L TO P-CCNC: 108 U/L
LYMPHOCYTES NFR FLD: 19 %
MAGNESIUM SERPL-MCNC: 1.9 MG/DL (ref 1.6–2.4)
MESOTHL CELL NFR FLD: 7 %
MONOS+MACROS NFR FLD: 65 %
NEUTROPHILS NFR FLD: 9 %
NT PRO BNP: 852 PG/ML
NUC CELL # FLD: 490 /CU MM
PH FLD: 6.4
POTASSIUM SERPL-SCNC: 3.1 MMOL/L (ref 3.5–5.1)
PROT FLD-MCNC: 2.1 G/DL
RBC # FLD: >100 /CU MM
SPECIMEN SOURCE FLD: ABNORMAL
SPECIMEN SOURCE FLD: NORMAL
SPECIMEN SOURCE FLD: NORMAL

## 2024-04-30 PROCEDURE — 6370000000 HC RX 637 (ALT 250 FOR IP): Performed by: INTERNAL MEDICINE

## 2024-04-30 PROCEDURE — 87205 SMEAR GRAM STAIN: CPT

## 2024-04-30 PROCEDURE — 2500000003 HC RX 250 WO HCPCS

## 2024-04-30 PROCEDURE — 83880 ASSAY OF NATRIURETIC PEPTIDE: CPT

## 2024-04-30 PROCEDURE — 0W993ZX DRAINAGE OF RIGHT PLEURAL CAVITY, PERCUTANEOUS APPROACH, DIAGNOSTIC: ICD-10-PCS | Performed by: STUDENT IN AN ORGANIZED HEALTH CARE EDUCATION/TRAINING PROGRAM

## 2024-04-30 PROCEDURE — 83735 ASSAY OF MAGNESIUM: CPT

## 2024-04-30 PROCEDURE — 84132 ASSAY OF SERUM POTASSIUM: CPT

## 2024-04-30 PROCEDURE — 36415 COLL VENOUS BLD VENIPUNCTURE: CPT

## 2024-04-30 PROCEDURE — 88112 CYTOPATH CELL ENHANCE TECH: CPT

## 2024-04-30 PROCEDURE — 71045 X-RAY EXAM CHEST 1 VIEW: CPT

## 2024-04-30 PROCEDURE — 84157 ASSAY OF PROTEIN OTHER: CPT

## 2024-04-30 PROCEDURE — 6370000000 HC RX 637 (ALT 250 FOR IP): Performed by: NURSE PRACTITIONER

## 2024-04-30 PROCEDURE — 6360000002 HC RX W HCPCS: Performed by: INTERNAL MEDICINE

## 2024-04-30 PROCEDURE — 83615 LACTATE (LD) (LDH) ENZYME: CPT

## 2024-04-30 PROCEDURE — 97530 THERAPEUTIC ACTIVITIES: CPT

## 2024-04-30 PROCEDURE — 89050 BODY FLUID CELL COUNT: CPT

## 2024-04-30 PROCEDURE — 97161 PT EVAL LOW COMPLEX 20 MIN: CPT

## 2024-04-30 PROCEDURE — 88305 TISSUE EXAM BY PATHOLOGIST: CPT

## 2024-04-30 PROCEDURE — 87070 CULTURE OTHR SPECIMN AEROBIC: CPT

## 2024-04-30 PROCEDURE — 97165 OT EVAL LOW COMPLEX 30 MIN: CPT

## 2024-04-30 PROCEDURE — 2580000003 HC RX 258: Performed by: HOSPITALIST

## 2024-04-30 PROCEDURE — 94761 N-INVAS EAR/PLS OXIMETRY MLT: CPT

## 2024-04-30 PROCEDURE — 83986 ASSAY PH BODY FLUID NOS: CPT

## 2024-04-30 PROCEDURE — 6370000000 HC RX 637 (ALT 250 FOR IP): Performed by: HOSPITALIST

## 2024-04-30 PROCEDURE — APPSS45 APP SPLIT SHARED TIME 31-45 MINUTES: Performed by: NURSE PRACTITIONER

## 2024-04-30 PROCEDURE — 2060000000 HC ICU INTERMEDIATE R&B

## 2024-04-30 PROCEDURE — 32555 ASPIRATE PLEURA W/ IMAGING: CPT

## 2024-04-30 RX ORDER — FUROSEMIDE 40 MG/1
40 TABLET ORAL DAILY
Status: DISCONTINUED | OUTPATIENT
Start: 2024-04-30 | End: 2024-05-01 | Stop reason: HOSPADM

## 2024-04-30 RX ORDER — LIDOCAINE HYDROCHLORIDE 10 MG/ML
10 INJECTION, SOLUTION EPIDURAL; INFILTRATION; INTRACAUDAL; PERINEURAL ONCE
Status: COMPLETED | OUTPATIENT
Start: 2024-04-30 | End: 2024-04-30

## 2024-04-30 RX ADMIN — ASPIRIN 81 MG: 81 TABLET, COATED ORAL at 08:14

## 2024-04-30 RX ADMIN — POTASSIUM BICARBONATE 40 MEQ: 782 TABLET, EFFERVESCENT ORAL at 14:01

## 2024-04-30 RX ADMIN — ATORVASTATIN CALCIUM 20 MG: 20 TABLET, FILM COATED ORAL at 08:14

## 2024-04-30 RX ADMIN — AMIODARONE HYDROCHLORIDE 200 MG: 200 TABLET ORAL at 08:14

## 2024-04-30 RX ADMIN — LIDOCAINE HYDROCHLORIDE 10 ML: 10 INJECTION, SOLUTION EPIDURAL; INFILTRATION; INTRACAUDAL; PERINEURAL at 14:33

## 2024-04-30 RX ADMIN — SODIUM CHLORIDE, PRESERVATIVE FREE 10 ML: 5 INJECTION INTRAVENOUS at 20:41

## 2024-04-30 RX ADMIN — FUROSEMIDE 40 MG: 40 TABLET ORAL at 08:14

## 2024-04-30 RX ADMIN — IRON SUCROSE 200 MG: 20 INJECTION, SOLUTION INTRAVENOUS at 12:21

## 2024-04-30 NOTE — PROGRESS NOTES
[facility]  [address] [place] [address]  [phone]      Hospitalist  Progress Note      NAME:       Alicia Torres   :        1950  MRM:        [id number]    Date of service: 2024      Subjective: Patient seen and examined by me. Patient admitted with TAYLOR. She experienced SOB while working with PT today. Not requiring oxygen. Not cough. No fever.       Objective:    Vital Signs:    BP (!) 111/56   Pulse 71   Temp 98.3 °F (36.8 °C) (Oral)   Resp 17   Ht 1.6 m (5' 3\")   Wt 52.5 kg (115 lb 11.9 oz)   LMP  (LMP Unknown)   SpO2 91% Comment: RN notified  BMI 20.50 kg/m²        Intake/Output Summary (Last 24 hours) at 2024 1101  Last data filed at 2024 0730  Gross per 24 hour   Intake 580 ml   Output 250 ml   Net 330 ml          Current inpatient medications reviewed:  Current Facility-Administered Medications   Medication Dose Route Frequency    furosemide (LASIX) tablet 40 mg  40 mg Oral Daily    iron sucrose (VENOFER) injection 200 mg  200 mg IntraVENous Q24H    amiodarone (CORDARONE) tablet 200 mg  200 mg Oral Daily    aspirin EC tablet 81 mg  81 mg Oral Daily    atorvastatin (LIPITOR) tablet 20 mg  20 mg Oral Daily    [Held by provider] apixaban (ELIQUIS) tablet 5 mg  5 mg Oral BID    sodium chloride flush 0.9 % injection 5-40 mL  5-40 mL IntraVENous 2 times per day    sodium chloride flush 0.9 % injection 5-40 mL  5-40 mL IntraVENous PRN    0.9 % sodium chloride infusion   IntraVENous PRN    magnesium sulfate 2000 mg in 50 mL IVPB premix  2,000 mg IntraVENous PRN    ondansetron (ZOFRAN-ODT) disintegrating tablet 4 mg  4 mg Oral Q8H PRN    Or    ondansetron (ZOFRAN) injection 4 mg  4 mg IntraVENous Q6H PRN    polyethylene glycol (GLYCOLAX) packet 17 g  17 g Oral Daily PRN    acetaminophen (TYLENOL) tablet 650 mg  650 mg Oral Q6H PRN    Or    acetaminophen (TYLENOL)

## 2024-04-30 NOTE — PROGRESS NOTES
Spiritual Care Partner Volunteer visited patient at [facility] in [facility] INTERMEDIATE CARE UNIT on 4/30/2024    Documented by:  Chaplain Sandy Oropeza MS, MDiv, [facility]  Spiritual Health Services  Paging service: [phone] (BRYN)

## 2024-04-30 NOTE — PROGRESS NOTES
Name: Alicia Torres Hospital: [facility]   : 1950 Admit Date: 2024   Phone: [phone]  Room: [place] [address]   PCP: Karla Canales MD  MRN: [id number]   Date: 2024  Code: Full Code           Chart and notes reviewed. Data reviewed. I review the patient's current medications in the medical record at each encounter.  I have evaluated and examined the patient.      History of Present Illness:  Ms. Torres is a 73 yo wit ha history of MVP with severe MR s/p MV valvuloplasty (3/18/24), post-op stephanie cardia s/p PPM 3/22, prior R pleural effusion post-op requiring R sided chest tube placement, HTN, HLD, and former tobacco use who is admitted with shortness of breath on exertion. Imaging showed a small/moderate R pleural effusion (as well as trace L effusion), associated compressive atelectasis, and some ggo in the RML. She describes several days of increases shortness of breath on exertion as well as some LE swelling. She take lasix PRN and took a dose yesterday without improvement in her symptoms. She has not been hypoxic. Berry sat of 91% documented and was satting 99% on RA at rest. She reports responding well to IV diuretics that have been started.     Her son describes voice changes and upper respiratory symptoms post-op. She had some vocal cord dysfunction following intubation may years ago.     UOP: 500 with one unmeasured void as well     Labs: Na 134, K 3.3 (initially 5.2), Cr 1.08, Mag 1.5, procal <0.05, proBNP 2743, WBC 6.8, Hgb 8.7,      Images reviewed:  CT chest 2024: small/moderate R pleural effusion with associated compressive atelectasis, RML ggo; mild cardiomegaly with MV replacement, L atrial appendage occlusion device, PPM; hypoattenuating lesion in the lateral L hepatic lobe     Interval History:  No acute events. She feels somewhat better, but still short of breath when she ambulates to the bathroom. No pleuritic pain, cough, wheezing.

## 2024-04-30 NOTE — PROGRESS NOTES
1900: Bedside shift change report given to VINCE De Leon (oncoming nurse) by VINCE Mcodnnell (offgoing nurse). Report included the following information Nurse Handoff Report, Adult Overview, Surgery Report, Intake/Output, Recent Results, and Cardiac Rhythm NSR .      0700: Bedside shift change report given to VINCE Mcdonnell (oncoming nurse) by VINCE De Leon (offgoing nurse). Report included the following information Nurse Handoff Report, Adult Overview, Surgery Report, Intake/Output, Recent Results, and Cardiac Rhythm NSR .

## 2024-04-30 NOTE — CARE COORDINATION
1524:  Pt was declined by W as she is too high functioning for IPR.  [facility].  She would like to go to OP tx.  Her son will transport her at d/c.       04/30/24 1231   Condition of Participation: Discharge Planning   The Plan for Transition of Care is related to the following treatment goals: pleural effusion, dyspnea, acute on chronic CHF   The Patient and/or Patient Representative was provided with a Choice of Provider? Patient   The Patient and/Or Patient Representative agree with the Discharge Plan? Yes   Freedom of Choice list was provided with basic dialogue that supports the patient's individualized plan of care/goals, treatment preferences, and shares the quality data associated with the providers?  Yes     Patient would like to go to [facility].  A referral was sent in [facility].

## 2024-04-30 NOTE — PROGRESS NOTES
ATTENDING CARDIOLOGIST  The patient was personally examined and chart reviewed. All the elements of history and examination were personally performed and I agree with the plan as listed by advanced practice provider.    Treatment plan was addressed with the patient.      Subjective:  SOB  Thoracentesis planned for today  Requests rehab for rehab given deconditioning        Blood pressure 127/70, pulse 72, temperature 98.3 °F (36.8 °C), temperature source Oral, resp. rate 22, height 1.6 m (5' 3\"), weight 52.5 kg (115 lb 11.9 oz), SpO2 97 %.  Normal rate, regular rhythm, S1/S2  Lungs decreased breath sounds      A/P:  SOB  - Recent MV valvuloplasty + ring + MAZE + Atriclip 3/18/24 [facility] - echo reviewed and shows normal LV fxn, mild MR sp repair.  Normal RVSP.  Given IV lasix with subtly improvement. Plans for thoracentesis and eval for rehab given deconditioning post operatively.          []    High complexity decision making was performed  []    Patient is at high-risk of decompensation with multiple organ involvement        Cielo Schuler MS, MD, [facility]        Cielo Schuler MS, MD, FACC  [facility] Cad[facility]  [phone] (P)  [phone] (F)        [facility] CARDIOLOGY                    Cardiology Care Note     [x]Initial Encounter     []Follow-up    Patient Name: Alicia Torres - :1950 - MRN:[id number]  Primary Cardiologist: [facility]  Consulting Cardiologist: Cielo Schuler MD     Reason for encounter: CHF     HPI:       Alicia Torres is a 74 y.o. female with PMH significant for  hypertension, hyperlipidemia, mitral valve prolapse s/p mitral valve annuloplasty @ [facility] 2024 radial reconstruction, with ring (32 mm Hargrove Physio II complete annuloplasty band with ring with P2 triangular resection, P1-P2 cleft closure, P2-P3 cleft closure), Operative tissue ablation and reconstruction of atria, performed at the time of other cardiac procedure. EXTENSIVE (Santana Maze IX), Exclusion of left  PA and lateral chest  views in 4-6 weeks to ensure resolution.       CT Result (most recent):  CT CHEST WO CONTRAST 04/28/2024    Narrative  INDICATION: Pleural effusion.    COMPARISON: Earlier chest x-ray, CT 1/3/2024.    CONTRAST: None.    TECHNIQUE:  5 mm axial images were obtained through the chest. Coronal and  sagittal reformats were generated.  CT dose reduction was achieved through use  of a standardized protocol tailored for this examination and automatic exposure  control for dose modulation.    The absence of intravenous contrast reduces the sensitivity for evaluation of  the mediastinum, beata, vasculature, and upper abdominal organs.    FINDINGS:    CHEST WALL: There is asymmetry in the appearance of the breast which will  require clinical correlation. The right breast is larger than the left and shows  skin thickening and increased parenchymal opacification. This finding was not  shown previously. No axillary lymphadenopathy is shown.  THYROID: No nodule.  MEDIASTINUM: The patient is status post median sternotomy. Left subclavian  pacemaker with dual leads is shown. There is also mitral valve prosthetic  artifact as well as in exclusion device of the left atrial appendage. There is  no mediastinal mass or adenopathy. Normal size lymph nodes are shown.  BEATA: No mass or lymphadenopathy.  THORACIC AORTA: Ascending thoracic aortic ectasia with maximal diameter of 4.0  cm.  MAIN PULMONARY ARTERY: Normal in caliber.  TRACHEA/BRONCHI: Patent.  ESOPHAGUS: No wall thickening or dilatation.  HEART: Cardiac size is borderline enlarged. Coronary artery calcium: absent  PLEURA: Small to moderate right pleural effusion. Trace left pleural fluid.  LUNGS: Lateral basilar right lower lobe scarring is again shown. There are  ill-defined groundglass opacities in the right middle lobe as well as  tree-in-bud opacities which has increased in appearance in the interval. A 5 mm  calcified nodule of the left apex is again

## 2024-04-30 NOTE — PROGRESS NOTES
Patient arrives in ultrasound today for an ultrasound guided thoracentesis from room 333    ID verified, patient is alert and oriented x 4    Antonia Rene NP at bedside for consent at 1427, all questions answered    Time out completed at 1428    Procedure start time 1428    Staff members present:  Grant  helio Rene NP    Right posterior chest wall prepped under sterile technique, Antonia accessed with a 5 Greek drain, with clear red fluid return. Specimens sent to lab by [employer].    Connected to drainage canister    Total drainage out was 355mL    Procedure end time 1436    Dressing applied to posterior chest wall by US tech Francesca, dressing is clean, dry, and intact    Portable chest xray ordered to be completed at 1500 in her inpatient room    Patient tolerated overall procedure well    Report to primary RN via perfect serve    Kelley Medina RN

## 2024-04-30 NOTE — PLAN OF CARE
OCCUPATIONAL THERAPY EVALUATION/DISCHARGE  Patient: Alicia Torres (74 y.o. female)  Date: 4/30/2024  Primary Diagnosis: Pleural effusion [J90]  Dyspnea, unspecified type [R06.00]  Acute on chronic congestive heart failure, unspecified heart failure type (HCC) [I50.9]         Precautions: Up Ad Gogo                  ASSESSMENT :  Patient received seated EOB A&OX4 and agreeable for OT eval/tx. Per pt report, pt lives alone in a two level condo with walk in entrance via garage and aprox 14 steps ronald rails to second level bedroom/bathroom (2 steps to go into shower) and is independent for self care and functional transfers/mobility (has a rollator that pt was utilizing after recent mitral valv repair in 03/2024).     Patient educated on energy conservation techniques with pt verbalizing understanding. Patient presents close to baseline for self care (independent LB dressing, independent toileting/cloth mgmt, independent grooming standing sink) and functional transfers/mobility (independent bed mobility, independent sit<->stand and toilet transfer). Patient with no acute OT needs at this time thus will D/C from skilled OT services after eval. Recommend discharge to home with continued HH therapy when medically appropriate.     Functional Outcome Measure:  The patient scored 24/24 on the [facility] AM-PAC outcome measure     PLAN :  Recommend with staff: up to chair for meals, up to commode for toileting    Recommendation for discharge: (in order for the patient to meet his/her long term goals): continue HHOT (pt was receiving HH services prior to admission)    Other factors to consider for discharge: no additional factors    IF patient discharges home will need the following DME: shower chair     SUBJECTIVE:   Patient stated, “its the opposite of what my son told me I need to do.” after pt educated on energy conservation techniques    OBJECTIVE DATA SUMMARY:     Past Medical History:   Diagnosis Date     limits    Strength:  Strength: Within functional limits    Coordination:  Coordination: Within functional limits     Functional Mobility and Transfers for ADLs:  Bed Mobility:     Bed Mobility Training  Bed Mobility Training: Yes  Rolling: Independent  Supine to Sit: Independent  Sit to Supine: Independent  Scooting: Independent    Transfers:     Transfer Training  Transfer Training: Yes  Sit to Stand: Independent  Stand to Sit: Independent  Bed to Chair: Independent  Toilet Transfer: Independent     Balance:   Standing: Intact  Balance  Sitting: Intact  Standing: Intact    ADL Assessment:     Grooming: Independent  Grooming Skilled Clinical Factors: standing sink    LE Dressing: Independent     Toileting: Independent     ADL Intervention and task modifications:    Educated patient on energy conservation techniques, strategies to maximize quality of life and decrease stress/anxiety.     1. Deep breathing and or pursed lip breathing techniques  2. Pacing and making sure he/she takes short frequent breaks (e.g. In the shower wash the upper body, rest for 1 minute, then wash the lower body, etc)  3. Using cooler water in the shower so as to not get fatigued from the heat  4. Drying off by using a raquel cloth robe  5. Re-arranging his/her routine to allow for rest breaks in the morning routine  6. Prioritizing daily tasks, spreading needed tasks throughout day/week as able  7. Accepting help when offered  8. Using a mantra and medication to decrease feelings of anxiety, especially when short of breath  9. Looking at the consequences of his/her actions before deciding he/she needs to take on a task (e.g not getting down on one's hands and knees to wash floors because it will take all of one's   energy for the day and result in exhaustion).  10  Performing ADLS and IADLS seated PRN  10. DME used to help conserve energy, such as a shower seat, a stool or chair in the kitchen, and pushing or pulling items instead of

## 2024-04-30 NOTE — PROGRESS NOTES
PHYSICAL THERAPY EVALUATION/DISCHARGE    Patient: Alicia Torres (74 y.o. female)  Date: 4/30/2024  Primary Diagnosis: Pleural effusion [J90]  Dyspnea, unspecified type [R06.00]  Acute on chronic congestive heart failure, unspecified heart failure type (HCC) [I50.9]       Precautions: Up Ad Gogo                 ASSESSMENT AND RECOMMENDATIONS:  Based on the objective data below, the patient presents with independent gait and functional mobility in the setting of hospital admission for increased dyspnea on exertion. Past medical history notable for recent pacemaker placement (3/17/24 at [facility]), however patient now beyond 6 week pacemaker precautions. She has been up ad gogo in her room this admission, to and from the restroom. Patient today tolerates 200ft ambulation with independence and mild shortness of breath. She ascends 12 steps with single handrail with independence and takes a 1 minute standing rest break at the top of the steps prior to descending independently. Heart rate remains <114 bpm and SpO2 89-92 % at conclusion of ambulation but recovers to 99% within 1 minute seated rest. Patient had OP cardiac rehab arranged, however missed her first appointment for this admission. She would benefit from rescheduling her OP cardiac rehab upon d/c, however no further acute PT needs identified.     Functional Outcome Measure:  The patient scored 24 on the [facility] outcome measure which is indicative of reduced odds of requiring post acute SNF/IPR upon d/c.          Further skilled acute physical therapy is not indicated at this time.       PLAN :  Recommendation for discharge: (in order for the patient to meet his/her long term goals): OP cardiac rehab     Other factors to consider for discharge: lives alone    IF patient discharges home will need the following DME: none       SUBJECTIVE:   Patient stated “can you do it here?.” re: cardiac rehab     OBJECTIVE DATA SUMMARY:   Patient received seated in bedside chair,  4   3.  Moving to and from a bed to a chair (including a wheelchair)? []  1 []  2 []  3  [x]  4   4. Standing up from a chair using your arms (e.g. wheelchair or bedside chair)? []  1 []  2 []  3  [x]  4   5.  Walking in hospital room? []  1 []  2 []  3  [x]  4   6.  Climbing 3-5 steps with a railing? []  1 []  2 []  3  [x]  4     Raw Score: 24/24                            Cutoff score ?171,2,3 had higher odds of discharging home with home health or need of SNF/IPR.    1. Kari King, Elizabeth Phipps, Joe Srinivasan, Nuzhat Euceda, Riley Waller, Corby King.  Validity of the AM-PAC “6-Clicks” Inpatient Daily Activity and Basic Mobility Short Forms. Physical Therapy Mar 2014, 94 3) [phone]; DOI: 10.2522/ptj.04557024  2. Amor CLEVELAND, Anushka J, Binu J, Dimas RINCON. Association of AM-PAC \"6-Clicks\" Basic Mobility and Daily Activity Scores With Discharge Destination. Phys Ther. 2021 Apr 4;101(4):eykv959. doi: 10.1093/ptj/gcrs424. PMID: 08438218.  3. Zara RINCON, Martín D, Ramya S, Rasheed K, Sukumar S. Activity Measure for Post-Acute Care \"6-Clicks\" Basic Mobility Scores Predict Discharge Destination After Acute Care Hospitalization in Select Patient Groups: A Retrospective, Observational Study. Arch Rehabil Res Clin Transl. 2022 Jul 16;4(3):499225. doi: 10.1016/j.arrct.2022.327041. PMID: 12904053; PMCID: NHE8054107.  4. Christine HAQUE, Ana S, Dot W, Germaine P. AM-PAC Short Forms Manual 4.0. Revised 2/2020.                                                                                                                                                                                                                              Pain Rating:  Patient without reports of pain during therapy    Pain Intervention(s):       Activity Tolerance:   Good, requires rest breaks, observed shortness of breath on exertion, and SpO2 stable on room air    After treatment:   Patient left in no apparent distress sitting up in

## 2024-05-01 VITALS
HEIGHT: 63 IN | DIASTOLIC BLOOD PRESSURE: 62 MMHG | OXYGEN SATURATION: 96 % | SYSTOLIC BLOOD PRESSURE: 114 MMHG | WEIGHT: 115.74 LBS | BODY MASS INDEX: 20.51 KG/M2 | RESPIRATION RATE: 16 BRPM | TEMPERATURE: 97.7 F | HEART RATE: 76 BPM

## 2024-05-01 LAB
ANION GAP SERPL CALC-SCNC: 4 MMOL/L (ref 5–15)
BUN SERPL-MCNC: 9 MG/DL (ref 6–20)
BUN/CREAT SERPL: 12 (ref 12–20)
CALCIUM SERPL-MCNC: 8.9 MG/DL (ref 8.5–10.1)
CHLORIDE SERPL-SCNC: 96 MMOL/L (ref 97–108)
CO2 SERPL-SCNC: 34 MMOL/L (ref 21–32)
CREAT SERPL-MCNC: 0.74 MG/DL (ref 0.55–1.02)
CYTOLOGY-NON GYN: NORMAL
GLUCOSE SERPL-MCNC: 94 MG/DL (ref 65–100)
MAGNESIUM SERPL-MCNC: 1.7 MG/DL (ref 1.6–2.4)
POTASSIUM SERPL-SCNC: 3.5 MMOL/L (ref 3.5–5.1)
SODIUM SERPL-SCNC: 134 MMOL/L (ref 136–145)

## 2024-05-01 PROCEDURE — 80048 BASIC METABOLIC PNL TOTAL CA: CPT

## 2024-05-01 PROCEDURE — 6370000000 HC RX 637 (ALT 250 FOR IP): Performed by: HOSPITALIST

## 2024-05-01 PROCEDURE — APPSS45 APP SPLIT SHARED TIME 31-45 MINUTES: Performed by: NURSE PRACTITIONER

## 2024-05-01 PROCEDURE — 36415 COLL VENOUS BLD VENIPUNCTURE: CPT

## 2024-05-01 PROCEDURE — 6370000000 HC RX 637 (ALT 250 FOR IP): Performed by: NURSE PRACTITIONER

## 2024-05-01 PROCEDURE — 83735 ASSAY OF MAGNESIUM: CPT

## 2024-05-01 PROCEDURE — 2580000003 HC RX 258: Performed by: HOSPITALIST

## 2024-05-01 PROCEDURE — 6360000002 HC RX W HCPCS: Performed by: INTERNAL MEDICINE

## 2024-05-01 PROCEDURE — 94761 N-INVAS EAR/PLS OXIMETRY MLT: CPT

## 2024-05-01 PROCEDURE — 94618 PULMONARY STRESS TESTING: CPT

## 2024-05-01 PROCEDURE — 99233 SBSQ HOSP IP/OBS HIGH 50: CPT | Performed by: INTERNAL MEDICINE

## 2024-05-01 RX ORDER — FERROUS SULFATE 325(65) MG
325 TABLET ORAL
Qty: 90 TABLET | Refills: 1 | Status: SHIPPED | OUTPATIENT
Start: 2024-05-01

## 2024-05-01 RX ORDER — FUROSEMIDE 40 MG/1
40 TABLET ORAL DAILY
Qty: 30 TABLET | Refills: 1 | Status: SHIPPED | OUTPATIENT
Start: 2024-05-01 | End: 2024-05-31

## 2024-05-01 RX ORDER — POTASSIUM CHLORIDE 20 MEQ/1
20 TABLET, EXTENDED RELEASE ORAL DAILY
Qty: 10 TABLET | Refills: 0 | Status: SHIPPED | OUTPATIENT
Start: 2024-05-01 | End: 2024-05-11

## 2024-05-01 RX ADMIN — APIXABAN 5 MG: 5 TABLET, FILM COATED ORAL at 08:30

## 2024-05-01 RX ADMIN — AMIODARONE HYDROCHLORIDE 200 MG: 200 TABLET ORAL at 08:30

## 2024-05-01 RX ADMIN — SODIUM CHLORIDE, PRESERVATIVE FREE 10 ML: 5 INJECTION INTRAVENOUS at 08:31

## 2024-05-01 RX ADMIN — IRON SUCROSE 200 MG: 20 INJECTION, SOLUTION INTRAVENOUS at 10:54

## 2024-05-01 RX ADMIN — ASPIRIN 81 MG: 81 TABLET, COATED ORAL at 08:30

## 2024-05-01 RX ADMIN — ATORVASTATIN CALCIUM 20 MG: 20 TABLET, FILM COATED ORAL at 08:30

## 2024-05-01 RX ADMIN — FUROSEMIDE 40 MG: 40 TABLET ORAL at 08:30

## 2024-05-01 NOTE — DISCHARGE INSTRUCTIONS
Hospital Medicine DISCHARGE INSTRUCTIONS    NAME: Alicia Torres   :  1950   MRN:  [id number]     Date:     2024    Admission date: 2024     Discharge date:  2024     Reason for your admission:  Pleural effusion [J90]  Dyspnea, unspecified type [R06.00]  Acute on chronic congestive heart failure, unspecified heart failure type (HCC) [I50.9]    Discharge Diagnoses:  Acute on chronic congestive heart failure, unspecified heart failure type (HCC) [I50.9]    DISCHARGE INSTRUCTIONS:    Thank you for allowing us to participate in your care. Your discharging Hospitalist is Neri Yi MD. You were admitted for evaluation and treatment for the above diagnoses.    Medications:     It is important that medications are taken exactly as they are prescribed on the discharge medication instructions and keep them your  in the bottles provided by the pharmacist.   Keep a list of the medication names, dosages, and times to be taken at all times.    Do not take other medications without consulting your doctor.     Recommended diet:  cardiac diet    Recommended activity: activity as tolerated    Post discharge care:    For questions regarding your Hospitalization or to contact the Hospital Medicine team, please call [phone].    Notify follow up health care provider or return to the emergency department if you cannot get hold of your doctor if you feel worse or experience symptoms similar to those that brought you to hospital    Karla Canales MD   [address] [address] [phone]    Schedule an appointment as soon as possible for a visit  to schedule a regular follow up after discharge       Information obtained by :  I understand that if any problems occur once I am at home I am to contact my physician and I understand and acknowledge receipt of the instructions indicated above.

## 2024-05-01 NOTE — PROGRESS NOTES
Physician Progress Note      PATIENT:               HUGO ALEGRIA  [facility] #:                  [id number]  :                       1950  ADMIT DATE:       2024 12:39 PM  DISCH DATE:  RESPONDING  PROVIDER #:        Neri Yi MD          QUERY TEXT:    Good morning.    Patient admitted with acute on chronic HFrEF, noted to have paroxysmal atrial   fibrillation and is maintained on Eliquis.    If possible, please document in progress notes and discharge summary if you   are evaluating and/or treating any of the following:    The medical record reflects the following:    Risk Factors:74 yr old female, HTN, HFrEF, PAF    Clinical Indicators: from  Cardiology consult: \"Hx a fib : amio/eliquis\"    Treatment: Eliquis Cardiology consult, 5 mg po twice daily      Thank you  Chey Betancur RN, CDI  Options provided:  -- Secondary hypercoagulable state in a patient with atrial fibrillation  -- Other - I will add my own diagnosis  -- Disagree - Not applicable / Not valid  -- Disagree - Clinically unable to determine / Unknown  -- Refer to Clinical Documentation Reviewer    PROVIDER RESPONSE TEXT:    This patient has secondary hypercoagulable state in a patient with atrial   fibrillation.    Query created by: Chey Betancur on 2024 8:05 AM      Electronically signed by:  Neri Yi MD 2024 12:45 PM

## 2024-05-01 NOTE — DISCHARGE SUMMARY
JUAN SHRESTHA [facility]  [address] [place] [address]  Tel: [phone]    Hospital Medicine Discharge Summary    Patient ID:    Alicia Torres  Age:              74 y.o.    : 1950  MRN:             [id number]     PCP: Karla Canales MD     Date of Admission: 2024    Date of Discharge:  2024    Discharge Diagnoses:  Principal Problem:    Acute on chronic HFrEF (heart failure with reduced ejection fraction) ([facility])    HTN, goal below 130/80    Dyslipidemia    PAF (paroxysmal atrial fibrillation) ([facility])    Nonrheumatic mitral valve regurgitation    Junctional bradycardia    Bilateral pleural effusion    Hypokalemia    Hypomagnesemia    Anemia    Breast asymmetry    Dyspnea    Reason for admission:    Pleural effusion [J90]  Dyspnea, unspecified type [R06.00]  Acute on chronic congestive heart failure, unspecified heart failure type ([facility]) [I50.9]    Diagnostic testing:    Laboratory data reviewed and independently interpreted:    Recent Labs     24  1256 24  0030   WBC 8.4 6.8   HGB 9.3* 8.7*   HCT 30.4* 27.7*   RBC 3.12* 2.93*   MCV 97.4 94.5   MCH 29.8 29.7    236     No results found for: \"LACTA\"  Recent Labs     24  1256 24  0030 24  0548 24  0144   * 134*  --  134*   K 5.2* 3.3* 3.1* 3.5   CL 98 100  --  96*   CO2 25 30  --  34*   GLUCOSE 99 124*  --  94   BUN 13 13  --  9   CREATININE 1.15* 1.08*  --  0.74   CALCIUM 8.8 8.4*  --  8.9   MG 1.6 1.5* 1.9 1.7   BILITOT 0.7  --   --   --    ALKPHOS 83  --   --   --    AST 42*  --   --   --    ALT 28  --   --   --      No components found for: \"GLUCOSEPOC\"  Lab Results   Component Value Date/Time    CHOL 205 2023 11:07 AM    TRIG 134 2023 11:07 AM     2023 11:07 AM       Imaging data reviewed:    XR CHEST PORTABLE    Result Date: 2024  Decreasing small right pleural effusion.     US  POA: due to current illness. Done well with PT who recommend outpatient cardiac rehab where she was due to start.      2.0 x 2.2 x 2.9 cm hypoattenuating lesion in the lateral segment of the left hepatic lobe POA: discussed with radiology who will suspects this to be likely fat and benign given it was also noted in 1/2024. Outpatient surevilance       Ascending thoracic aortic ectasia with transverse dimension of 4.0 cm POA: outpatient follow up    Discharge Exam:  /62   Pulse 76   Temp 97.7 °F (36.5 °C) (Oral)   Resp 16   Ht 1.6 m (5' 3\")   Wt 52.5 kg (115 lb 11.9 oz)   LMP  (LMP Unknown)   SpO2 96%   BMI 20.50 kg/m²      Patient has been seen and examined.    General: well looking and in no acute distress  Pulm: clear breath sounds without wheezes  Card: no murmurs, normal S1, S2 without thrills, bruits   Abd:    soft, non-tender, normoactive bowel sounds  Skin: no rashes and skin turgor is good  Neuro: awake, alert and has a non focal     Activity: Activity as tolerated    Diet: cardiac diet    Current Discharge Medication List        START taking these medications    Details   potassium chloride (KLOR-CON M) 20 MEQ extended release tablet Take 1 tablet by mouth daily for 10 days  Qty: 10 tablet, Refills: 0      ferrous sulfate (IRON 325) 325 (65 Fe) MG tablet Take 1 tablet by mouth daily (with breakfast)  Qty: 90 tablet, Refills: 1           CONTINUE these medications which have CHANGED    Details   furosemide (LASIX) 40 MG tablet Take 1 tablet by mouth daily  Qty: 30 tablet, Refills: 1           CONTINUE these medications which have NOT CHANGED    Details   oxyCODONE (ROXICODONE) 5 MG immediate release tablet As needed      methocarbamol (ROBAXIN) 750 MG tablet Take 1 tablet by mouth as needed      metoprolol tartrate (LOPRESSOR) 25 MG tablet Take 1 tablet by mouth 2 times daily      atorvastatin (LIPITOR) 20 MG tablet Take 1 tablet by mouth daily      aspirin 81 MG EC tablet Take 1 tablet by

## 2024-05-01 NOTE — PLAN OF CARE
Problem: Discharge Planning  Goal: Discharge to home or other facility with appropriate resources  5/1/2024 1455 by Gabe Doyle RN  Outcome: Completed  5/1/2024 0319 by Pola Alcazar RN  Outcome: Progressing     Problem: Safety - Adult  Goal: Free from fall injury  5/1/2024 1455 by Gabe Doyle RN  Outcome: Completed  5/1/2024 0319 by Pola Alcazar RN  Outcome: Progressing     Problem: Chronic Conditions and Co-morbidities  Goal: Patient's chronic conditions and co-morbidity symptoms are monitored and maintained or improved  5/1/2024 1455 by Gabe Doyle RN  Outcome: Completed  5/1/2024 0319 by Pola Alcazar RN  Outcome: Progressing

## 2024-05-01 NOTE — PROGRESS NOTES
Pleural effusion.    COMPARISON: Earlier chest x-ray, CT 1/3/2024.    CONTRAST: None.    TECHNIQUE:  5 mm axial images were obtained through the chest. Coronal and  sagittal reformats were generated.  CT dose reduction was achieved through use  of a standardized protocol tailored for this examination and automatic exposure  control for dose modulation.    The absence of intravenous contrast reduces the sensitivity for evaluation of  the mediastinum, beata, vasculature, and upper abdominal organs.    FINDINGS:    CHEST WALL: There is asymmetry in the appearance of the breast which will  require clinical correlation. The right breast is larger than the left and shows  skin thickening and increased parenchymal opacification. This finding was not  shown previously. No axillary lymphadenopathy is shown.  THYROID: No nodule.  MEDIASTINUM: The patient is status post median sternotomy. Left subclavian  pacemaker with dual leads is shown. There is also mitral valve prosthetic  artifact as well as in exclusion device of the left atrial appendage. There is  no mediastinal mass or adenopathy. Normal size lymph nodes are shown.  BEATA: No mass or lymphadenopathy.  THORACIC AORTA: Ascending thoracic aortic ectasia with maximal diameter of 4.0  cm.  MAIN PULMONARY ARTERY: Normal in caliber.  TRACHEA/BRONCHI: Patent.  ESOPHAGUS: No wall thickening or dilatation.  HEART: Cardiac size is borderline enlarged. Coronary artery calcium: absent  PLEURA: Small to moderate right pleural effusion. Trace left pleural fluid.  LUNGS: Lateral basilar right lower lobe scarring is again shown. There are  ill-defined groundglass opacities in the right middle lobe as well as  tree-in-bud opacities which has increased in appearance in the interval. A 5 mm  calcified nodule of the left apex is again noted.  INCIDENTALLY IMAGED UPPER ABDOMEN: An abnormal appearance of the inferior  lateral segment of the left hepatic lobe is again noted with  mg, 200 mg, Oral, Daily, Denny Cueva MD, 200 mg at 04/30/24 0814    aspirin EC tablet 81 mg, 81 mg, Oral, Daily, Denny Cueva MD, 81 mg at 04/30/24 0814    atorvastatin (LIPITOR) tablet 20 mg, 20 mg, Oral, Daily, Denny Cueva MD, 20 mg at 04/30/24 0814    apixaban (ELIQUIS) tablet 5 mg, 5 mg, Oral, BID, Denny Cueva MD, 5 mg at 04/29/24 2034    sodium chloride flush 0.9 % injection 5-40 mL, 5-40 mL, IntraVENous, 2 times per day, Denny Cueva MD, 10 mL at 04/30/24 2041    sodium chloride flush 0.9 % injection 5-40 mL, 5-40 mL, IntraVENous, PRN, Denny Cueva MD    0.9 % sodium chloride infusion, , IntraVENous, PRN, Denny Cueva MD, Last Rate: 25 mL/hr at 04/29/24 0406, 25 mL at 04/29/24 0406    magnesium sulfate 2000 mg in 50 mL IVPB premix, 2,000 mg, IntraVENous, PRN, Denny Cueva MD, Stopped at 04/29/24 0624    ondansetron (ZOFRAN-ODT) disintegrating tablet 4 mg, 4 mg, Oral, Q8H PRN **OR** ondansetron (ZOFRAN) injection 4 mg, 4 mg, IntraVENous, Q6H PRN, Denny Cueva MD    polyethylene glycol (GLYCOLAX) packet 17 g, 17 g, Oral, Daily PRN, Denny Cueva MD    acetaminophen (TYLENOL) tablet 650 mg, 650 mg, Oral, Q6H PRN **OR** acetaminophen (TYLENOL) suppository 650 mg, 650 mg, Rectal, Q6H PRN, Denny Cueva MD Stephanie Heath, APRN - NP    [facility]  Call center: (P) [phone]  (F) [phone]      CC:Karla Canales MD

## 2024-05-01 NOTE — PROGRESS NOTES
05/01/24 1239   Resting (Room Air)   SpO2 94   HR 90   During Walk (Room Air)   SpO2 93   HR 97   After Walk   SpO2 95   HR 95   Comments pt walked in hallway on room air.  No SOB or desat noted.   Does the Patient Qualify for Home O2 No

## 2024-05-01 NOTE — PROGRESS NOTES
Name: Alicia Torres Hospital: [facility]   : 1950 Admit Date: 2024   Phone: [phone]  Room: [place] [address]   PCP: Karla Canales MD  MRN: [id number]   Date: 2024  Code: Full Code           Chart and notes reviewed. Data reviewed. I review the patient's current medications in the medical record at each encounter.  I have evaluated and examined the patient.      History of Present Illness:  Ms. Torres is a 73 yo with a history of MVP with severe MR s/p MV valvuloplasty (3/18/24), post-op stephanie cardia s/p PPM 3/22, prior R pleural effusion post-op requiring R sided chest tube placement, HTN, HLD, and former tobacco use who is admitted with shortness of breath on exertion. Imaging showed a small/moderate R pleural effusion (as well as trace L effusion), associated compressive atelectasis, and some ggo in the RML. She describes several days of increases shortness of breath on exertion as well as some LE swelling. She take lasix PRN and took a dose yesterday without improvement in her symptoms. She has not been hypoxic. Berry sat of 91% documented and was satting 99% on RA at rest. She reports responding well to IV diuretics that have been started.     Her son describes voice changes and upper respiratory symptoms post-op. She had some vocal cord dysfunction following intubation may years ago.     UOP: 500 with one unmeasured void as well     Labs: Na 134, K 3.3 (initially 5.2), Cr 1.08, Mag 1.5, procal <0.05, proBNP 2743, WBC 6.8, Hgb 8.7,      Images reviewed:  CT chest 2024: small/moderate R pleural effusion with associated compressive atelectasis, RML ggo; mild cardiomegaly with MV replacement, L atrial appendage occlusion device, PPM; hypoattenuating lesion in the lateral L hepatic lobe     Interval History:  Had a thoracentesis yesterday, 355cc removed. She was able to tolerate walking in the mauro  yesterday with less shortness of breath. Follow-up CXR

## 2024-05-04 LAB
BACTERIA SPEC CULT: NORMAL
GRAM STN SPEC: NORMAL
GRAM STN SPEC: NORMAL
SERVICE CMNT-IMP: NORMAL

## 2024-05-06 ENCOUNTER — OFFICE VISIT (OUTPATIENT)
Age: 74
End: 2024-05-06

## 2024-05-06 VITALS
TEMPERATURE: 97.5 F | HEIGHT: 63 IN | SYSTOLIC BLOOD PRESSURE: 105 MMHG | DIASTOLIC BLOOD PRESSURE: 69 MMHG | RESPIRATION RATE: 16 BRPM | BODY MASS INDEX: 20.34 KG/M2 | HEART RATE: 78 BPM | WEIGHT: 114.8 LBS | OXYGEN SATURATION: 97 %

## 2024-05-06 DIAGNOSIS — E87.6 HYPOKALEMIA: ICD-10-CM

## 2024-05-06 DIAGNOSIS — I77.810 THORACIC AORTIC ECTASIA (HCC): ICD-10-CM

## 2024-05-06 DIAGNOSIS — R06.09 DOE (DYSPNEA ON EXERTION): Primary | ICD-10-CM

## 2024-05-06 DIAGNOSIS — J90 BILATERAL PLEURAL EFFUSION: ICD-10-CM

## 2024-05-06 DIAGNOSIS — I50.32 CHRONIC HEART FAILURE WITH PRESERVED EJECTION FRACTION (HFPEF) (HCC): ICD-10-CM

## 2024-05-06 DIAGNOSIS — D64.9 NORMOCYTIC ANEMIA: ICD-10-CM

## 2024-05-06 DIAGNOSIS — N64.89 BREAST ASYMMETRY: ICD-10-CM

## 2024-05-06 DIAGNOSIS — K64.9 HEMORRHOIDS, UNSPECIFIED HEMORRHOID TYPE: ICD-10-CM

## 2024-05-06 DIAGNOSIS — G47.09 OTHER INSOMNIA: ICD-10-CM

## 2024-05-06 PROBLEM — I50.23 ACUTE ON CHRONIC HFREF (HEART FAILURE WITH REDUCED EJECTION FRACTION) (HCC): Status: RESOLVED | Noted: 2024-04-29 | Resolved: 2024-05-06

## 2024-05-06 LAB
ALBUMIN SERPL-MCNC: 3.5 G/DL (ref 3.5–5)
ALBUMIN/GLOB SERPL: 1.1 (ref 1.1–2.2)
ALP SERPL-CCNC: 84 U/L (ref 45–117)
ALT SERPL-CCNC: 32 U/L (ref 12–78)
ANION GAP SERPL CALC-SCNC: 2 MMOL/L (ref 5–15)
AST SERPL-CCNC: 38 U/L (ref 15–37)
BILIRUB SERPL-MCNC: 0.5 MG/DL (ref 0.2–1)
BUN SERPL-MCNC: 13 MG/DL (ref 6–20)
BUN/CREAT SERPL: 13 (ref 12–20)
CALCIUM SERPL-MCNC: 9.3 MG/DL (ref 8.5–10.1)
CHLORIDE SERPL-SCNC: 98 MMOL/L (ref 97–108)
CO2 SERPL-SCNC: 39 MMOL/L (ref 21–32)
CREAT SERPL-MCNC: 1 MG/DL (ref 0.55–1.02)
ERYTHROCYTE [DISTWIDTH] IN BLOOD BY AUTOMATED COUNT: 16.7 % (ref 11.5–14.5)
GLOBULIN SER CALC-MCNC: 3.2 G/DL (ref 2–4)
GLUCOSE SERPL-MCNC: 163 MG/DL (ref 65–100)
HCT VFR BLD AUTO: 32.1 % (ref 35–47)
HGB BLD-MCNC: 9.4 G/DL (ref 11.5–16)
MCH RBC QN AUTO: 29.1 PG (ref 26–34)
MCHC RBC AUTO-ENTMCNC: 29.3 G/DL (ref 30–36.5)
MCV RBC AUTO: 99.4 FL (ref 80–99)
NRBC # BLD: 0 K/UL (ref 0–0.01)
NRBC BLD-RTO: 0 PER 100 WBC
PLATELET # BLD AUTO: 287 K/UL (ref 150–400)
PMV BLD AUTO: 9.3 FL (ref 8.9–12.9)
POTASSIUM SERPL-SCNC: 3.5 MMOL/L (ref 3.5–5.1)
PROT SERPL-MCNC: 6.7 G/DL (ref 6.4–8.2)
RBC # BLD AUTO: 3.23 M/UL (ref 3.8–5.2)
SODIUM SERPL-SCNC: 139 MMOL/L (ref 136–145)
WBC # BLD AUTO: 6.7 K/UL (ref 3.6–11)

## 2024-05-06 RX ORDER — POTASSIUM CHLORIDE 20 MEQ/1
20 TABLET, EXTENDED RELEASE ORAL DAILY
Qty: 30 TABLET | Refills: 2 | Status: SHIPPED | OUTPATIENT
Start: 2024-05-06 | End: 2024-08-04

## 2024-05-06 RX ORDER — SERTRALINE HYDROCHLORIDE 25 MG/1
25 TABLET, FILM COATED ORAL
Qty: 30 TABLET | Refills: 5 | Status: SHIPPED | OUTPATIENT
Start: 2024-05-06

## 2024-05-06 RX ORDER — HYDROCORTISONE 25 MG/G
CREAM TOPICAL
Qty: 28 G | Refills: 1 | Status: SHIPPED | OUTPATIENT
Start: 2024-05-06

## 2024-05-09 ENCOUNTER — OFFICE VISIT (OUTPATIENT)
Age: 74
End: 2024-05-09
Payer: MEDICARE

## 2024-05-09 VITALS
HEART RATE: 84 BPM | HEIGHT: 63 IN | BODY MASS INDEX: 20.02 KG/M2 | DIASTOLIC BLOOD PRESSURE: 80 MMHG | RESPIRATION RATE: 18 BRPM | SYSTOLIC BLOOD PRESSURE: 128 MMHG | WEIGHT: 113 LBS | OXYGEN SATURATION: 98 %

## 2024-05-09 DIAGNOSIS — I50.32 CHRONIC DIASTOLIC HEART FAILURE (HCC): Primary | ICD-10-CM

## 2024-05-09 DIAGNOSIS — Z98.890 S/P MVR (MITRAL VALVE REPAIR): ICD-10-CM

## 2024-05-09 DIAGNOSIS — I77.810 THORACIC AORTIC ECTASIA (HCC): ICD-10-CM

## 2024-05-09 DIAGNOSIS — Z98.890 H/O MAZE PROCEDURE: ICD-10-CM

## 2024-05-09 DIAGNOSIS — D68.69 SECONDARY HYPERCOAGULABLE STATE (HCC): ICD-10-CM

## 2024-05-09 DIAGNOSIS — I48.0 PAROXYSMAL ATRIAL FIBRILLATION (HCC): ICD-10-CM

## 2024-05-09 DIAGNOSIS — Z95.0 PACEMAKER: ICD-10-CM

## 2024-05-09 PROCEDURE — 1111F DSCHRG MED/CURRENT MED MERGE: CPT | Performed by: SPECIALIST

## 2024-05-09 PROCEDURE — 3074F SYST BP LT 130 MM HG: CPT | Performed by: SPECIALIST

## 2024-05-09 PROCEDURE — 3017F COLORECTAL CA SCREEN DOC REV: CPT | Performed by: SPECIALIST

## 2024-05-09 PROCEDURE — 1036F TOBACCO NON-USER: CPT | Performed by: SPECIALIST

## 2024-05-09 PROCEDURE — G8399 PT W/DXA RESULTS DOCUMENT: HCPCS | Performed by: SPECIALIST

## 2024-05-09 PROCEDURE — G8427 DOCREV CUR MEDS BY ELIG CLIN: HCPCS | Performed by: SPECIALIST

## 2024-05-09 PROCEDURE — 1123F ACP DISCUSS/DSCN MKR DOCD: CPT | Performed by: SPECIALIST

## 2024-05-09 PROCEDURE — G8420 CALC BMI NORM PARAMETERS: HCPCS | Performed by: SPECIALIST

## 2024-05-09 PROCEDURE — 99215 OFFICE O/P EST HI 40 MIN: CPT | Performed by: SPECIALIST

## 2024-05-09 PROCEDURE — 1090F PRES/ABSN URINE INCON ASSESS: CPT | Performed by: SPECIALIST

## 2024-05-09 PROCEDURE — 3079F DIAST BP 80-89 MM HG: CPT | Performed by: SPECIALIST

## 2024-05-09 NOTE — PROGRESS NOTES
Chief Complaint   Patient presents with    Follow-up     6 week    Other     PAF     Vitals:    05/09/24 1102   BP: 128/80   Site: Left Upper Arm   Position: Sitting   Cuff Size: Medium Adult   Pulse: 84   Resp: 18   SpO2: 98%   Weight: 51.3 kg (113 lb)   Height: 1.6 m (5' 3\")     No chest pain/SOB  No recent hospitalizations/urgent care visits  No refills needed

## 2024-05-09 NOTE — PROGRESS NOTES
Tano Dailey MD. [facility]          Patient: Alicia Torres  : 1950      Today's Date: 2024        HISTORY OF PRESENT ILLNESS:     History of Present Illness:    Recent admission for CHF  Note states --   Alicia Torres is a 74 y.o. female with PMH significant for  hypertension, hyperlipidemia, mitral valve prolapse s/p mitral valve annuloplasty @ [facility] 2024 radial reconstruction, with ring (32 mm Hargrove Physio II complete annuloplasty band with ring with P2 triangular resection, P1-P2 cleft closure, P2-P3 cleft closure), Operative tissue ablation and reconstruction of atria, performed at the time of other cardiac procedure. EXTENSIVE (Santana Maze IX), Exclusion of left atrial appendage concomitant, any method (35 mm Atricure Flex V AtriClip) subsequently post op developed  junctional bradycardia with 100% dependence on pacemaker via temporary epicardial wires. EP was consulted and decision was made to place permanent pacemaker     SOB  - Recent MV valvuloplasty + ring + MAZE + Atriclip 3/18/24 [facility] Dr. Hernandez - echo reviewed and shows normal LV fxn, mild MR sp repair.  Normal RVSP.  Given IV lasix with subtle improvement. Thoracentesis performed.  PT/OT rec'd home therapy.  She plans to start cardiac rehab at [facility].  FU with Dr. Hernandez Friday at [facility].  Atrial fibrillation - amio 200mg po QD, eliquis 5 BID.  Sp AtriClip.  ASA on post operatively.      Feeling OK since discharge - Still with class 2 TAYLOR.  No orthopnea.      PAST MEDICAL HISTORY:     Past Medical History:   Diagnosis Date    Atrial fibrillation (HCC)     Dyslipidemia     H/O maze procedure     HTN, goal below 130/80 2015    Hx of bone density study 2016    Osteopenic    Hx of mammogram 07/10/2017    Negtaive     Impaired glucose tolerance 2015    Junctional bradycardia 2024    3/34 post op from heart surgery and mvr-pacemaker placed on 3/22/24    Mitral regurgitation     mild MR    MVP (mitral valve prolapse)

## 2024-05-11 NOTE — PROGRESS NOTES
Physician Progress Note      PATIENT:               HUGO ALEGRIA  [facility] #:                  [id number]  :                       1950  ADMIT DATE:       2024 12:39 PM  DISCH DATE:        2024 2:56 PM  RESPONDING  PROVIDER #:        eNri Yi MD          QUERY TEXT:    Good morning.    Pt admitted with acute on chronic HFrEF. Pt noted to also have hypertension   and nonrheumatic mitral valve regurgitation--s/p mitral clip.    If possible, please further specify the etiology of CHF, if able to be   determined:    The medical record reflects the following:    Risk Factors: 74 yr old female, HFrEF, HTN, nonrheumatic mitral valve   regurgitation POA: she is sp Mitral clip, PAF, junctional bradycardia-s/p   pacemaker, dyslipidemia, Atrial fibrillation    Clinical Indicators: from  Cardiology consult: \"HFmrEF: cont lasix 40 mg   IV BID, I.O, low na diet, repeat limited ECHO to reassess EF and 2 Hx severe   MR s/p mitral valve annuloplasty. ECHO to reassess MR. 3 Post procedure   junctional bradycardia s/p PPM 4. HTN: not on meds OP 5 HLD: 6 Hx a fib :   amio/eliquis\";  Echo: Normal left ventricular systolic function with a   visually estimated EF of 55 - 60%. EF by 2D Simpsons Biplane is 56%. Left   ventricle size is normal. Normal wall thickness. Normal wall motion. Abnormal   diastolic function, Right ventricle is dilated. Lead present in the right   ventricle. Mildly reduced systolic function. TAPSE is abnormal. Aortic Valve   Sclerosis of the aortic valve cusp. Mild regurgitation. No stenosis. Mitral   Valve Status post valvuloplasty and annular ring. Mild annular calcification   of the mitral valve. Mild regurgitation. No stenosis noted. Tricuspid Valve   structure is normal. Moderate regurgitation. No stenosis noted. RVSP may be   underestimated in the setting of eccentricity of TR jet; 24 12:56 NT   Pro-BNP: 2,743, 24 05:48 NT Pro-BNP: 852;  CXR: Increased small

## 2024-05-17 ENCOUNTER — HOSPITAL ENCOUNTER (OUTPATIENT)
Facility: HOSPITAL | Age: 74
Setting detail: RECURRING SERIES
Discharge: HOME | End: 2024-05-20
Payer: MEDICARE

## 2024-05-17 VITALS — RESPIRATION RATE: 16 BRPM | OXYGEN SATURATION: 96 % | BODY MASS INDEX: 19.21 KG/M2 | WEIGHT: 108.4 LBS | HEIGHT: 63 IN

## 2024-05-17 PROCEDURE — 93798 PHYS/QHP OP CAR RHAB W/ECG: CPT

## 2024-05-17 PROCEDURE — 93797 PHYS/QHP OP CAR RHAB WO ECG: CPT

## 2024-05-17 ASSESSMENT — PATIENT HEALTH QUESTIONNAIRE - PHQ9
SUM OF ALL RESPONSES TO PHQ9 QUESTIONS 1 & 2: 2
SUM OF ALL RESPONSES TO PHQ QUESTIONS 1-9: 7
3. TROUBLE FALLING OR STAYING ASLEEP: SEVERAL DAYS
5. POOR APPETITE OR OVEREATING: SEVERAL DAYS
4. FEELING TIRED OR HAVING LITTLE ENERGY: SEVERAL DAYS
7. TROUBLE CONCENTRATING ON THINGS, SUCH AS READING THE NEWSPAPER OR WATCHING TELEVISION: SEVERAL DAYS
SUM OF ALL RESPONSES TO PHQ QUESTIONS 1-9: 7
2. FEELING DOWN, DEPRESSED OR HOPELESS: SEVERAL DAYS
8. MOVING OR SPEAKING SO SLOWLY THAT OTHER PEOPLE COULD HAVE NOTICED. OR THE OPPOSITE, BEING SO FIGETY OR RESTLESS THAT YOU HAVE BEEN MOVING AROUND A LOT MORE THAN USUAL: NOT AT ALL
10. IF YOU CHECKED OFF ANY PROBLEMS, HOW DIFFICULT HAVE THESE PROBLEMS MADE IT FOR YOU TO DO YOUR WORK, TAKE CARE OF THINGS AT HOME, OR GET ALONG WITH OTHER PEOPLE: SOMEWHAT DIFFICULT
SUM OF ALL RESPONSES TO PHQ QUESTIONS 1-9: 7
6. FEELING BAD ABOUT YOURSELF - OR THAT YOU ARE A FAILURE OR HAVE LET YOURSELF OR YOUR FAMILY DOWN: SEVERAL DAYS
9. THOUGHTS THAT YOU WOULD BE BETTER OFF DEAD, OR OF HURTING YOURSELF: NOT AT ALL
1. LITTLE INTEREST OR PLEASURE IN DOING THINGS: SEVERAL DAYS
SUM OF ALL RESPONSES TO PHQ QUESTIONS 1-9: 7

## 2024-05-17 ASSESSMENT — EXERCISE STRESS TEST
PEAK_RPE: 11
PEAK_BP: 120/70
PEAK_HR: 85
PEAK_BP: 120/70
PEAK_BP: 120/70
PEAK_RPE: 11
PEAK_METS: 2.2
PEAK_HR: 85

## 2024-05-17 ASSESSMENT — EJECTION FRACTION: EF_VALUE: 55

## 2024-05-17 ASSESSMENT — LIFESTYLE VARIABLES
ALCOHOL_AMOUNT: 1
ALCOHOL_TYPE: WINE
ALCOHOL_USE: SPECIAL

## 2024-05-17 NOTE — CARDIO/PULMONARY
INTAKE APPOINTMENT NOTE  2024    NAME: Alicia Torres : 1950 AGE: 74 y.o.  GENDER: female    CARDIAC REHAB ADMITTING DIAGNOSIS: Other specified postprocedural states [Z98.890]    REFERRING PHYSICIAN: Magy Arnold P*    MEDICAL HX:  Past Medical History:   Diagnosis Date    Atrial fibrillation (HCC)     Dyslipidemia     H/O maze procedure     HTN, goal below 130/80 2015    Hx of bone density study 2016    Osteopenic    Hx of mammogram 07/10/2017    Negtaive     Impaired glucose tolerance 2015    Junctional bradycardia 2024    3/34 post op from heart surgery and mvr-pacemaker placed on 3/22/24    Mitral regurgitation     mild MR    MVP (mitral valve prolapse)     Osteopenia 2015    Pacemaker 2024    PAF (paroxysmal atrial fibrillation) ([facility])     Routine Papanicolaou smear 2018    Neg pap     S/P MVR (mitral valve repair)     Thoracic aortic ectasia ([facility]) 2024 4 cm       LABS:     No results found for: \"HBA1C\", \"[id number]\"  Lab Results   Component Value Date/Time    CHOL 205 2023 11:07 AM     2023 11:07 AM    LDL 66.2 2023 11:07 AM    VLDL 26.8 2023 11:07 AM         ANTHROPOMETRICS:      Ht Readings from Last 1 Encounters:   24 1.6 m (5' 3\")      Wt Readings from Last 1 Encounters:   24 49.2 kg (108 lb 6.4 oz)        WAIST: 34 (34@intake)       VISIT SUMMARY:    Alicia Torres 74 y.o. presented to Cardiac Rehab for program orientation and 6 minute walk test today with a primary diagnosis of Other specified postprocedural states [Z98.890]. EF is 55 % (55-60%, mod-severe TR, aortic ectasia 4.0 cm - echo 2024,) Cardiac risk factors include family history, hypertension, valvular heart disease.   Patient is a remote former smoker.  Alicia Torres is not  and lives alone. She has 3 sons, one who lives nearby and is a physician. Pt reports good support from family.  Patient was

## 2024-05-20 ENCOUNTER — HOSPITAL ENCOUNTER (OUTPATIENT)
Facility: HOSPITAL | Age: 74
Setting detail: RECURRING SERIES
Discharge: HOME | End: 2024-05-23
Payer: MEDICARE

## 2024-05-20 VITALS — WEIGHT: 107 LBS | BODY MASS INDEX: 18.95 KG/M2

## 2024-05-20 PROCEDURE — 93798 PHYS/QHP OP CAR RHAB W/ECG: CPT

## 2024-05-20 ASSESSMENT — EXERCISE STRESS TEST
PEAK_RPE: 10
PEAK_METS: 1.8
PEAK_HR: 94

## 2024-05-23 ENCOUNTER — HOSPITAL ENCOUNTER (OUTPATIENT)
Facility: HOSPITAL | Age: 74
Setting detail: RECURRING SERIES
Discharge: HOME | End: 2024-05-26
Payer: MEDICARE

## 2024-05-23 VITALS — WEIGHT: 104.4 LBS | BODY MASS INDEX: 18.49 KG/M2

## 2024-05-23 PROCEDURE — 93798 PHYS/QHP OP CAR RHAB W/ECG: CPT

## 2024-05-23 ASSESSMENT — EXERCISE STRESS TEST
PEAK_METS: 2.2
PEAK_RPE: 11
PEAK_HR: 97

## 2024-05-28 ENCOUNTER — HOSPITAL ENCOUNTER (OUTPATIENT)
Facility: HOSPITAL | Age: 74
Setting detail: RECURRING SERIES
Discharge: HOME | End: 2024-05-31
Payer: MEDICARE

## 2024-05-28 VITALS — BODY MASS INDEX: 18.78 KG/M2 | WEIGHT: 106 LBS

## 2024-05-28 PROCEDURE — 93798 PHYS/QHP OP CAR RHAB W/ECG: CPT

## 2024-05-28 ASSESSMENT — EXERCISE STRESS TEST
PEAK_HR: 94
PEAK_METS: 2.6
PEAK_RPE: 11

## 2024-05-30 ENCOUNTER — HOSPITAL ENCOUNTER (OUTPATIENT)
Facility: HOSPITAL | Age: 74
Setting detail: RECURRING SERIES
End: 2024-05-30
Payer: MEDICARE

## 2024-05-30 VITALS — WEIGHT: 105.4 LBS | BODY MASS INDEX: 18.67 KG/M2

## 2024-05-30 PROCEDURE — 93798 PHYS/QHP OP CAR RHAB W/ECG: CPT

## 2024-05-30 ASSESSMENT — EXERCISE STRESS TEST
PEAK_METS: 2.9
PEAK_RPE: 11
PEAK_HR: 96

## 2024-05-31 ENCOUNTER — HOSPITAL ENCOUNTER (OUTPATIENT)
Facility: HOSPITAL | Age: 74
Setting detail: RECURRING SERIES
End: 2024-05-31
Payer: MEDICARE

## 2024-05-31 VITALS — WEIGHT: 104.6 LBS | BODY MASS INDEX: 18.53 KG/M2

## 2024-05-31 PROCEDURE — 93798 PHYS/QHP OP CAR RHAB W/ECG: CPT

## 2024-05-31 ASSESSMENT — EXERCISE STRESS TEST
PEAK_RPE: 11
PEAK_HR: 100
PEAK_METS: 2.9

## 2024-06-03 ENCOUNTER — HOSPITAL ENCOUNTER (OUTPATIENT)
Facility: HOSPITAL | Age: 74
Setting detail: RECURRING SERIES
Discharge: HOME | End: 2024-06-06
Payer: MEDICARE

## 2024-06-03 VITALS — BODY MASS INDEX: 18.71 KG/M2 | WEIGHT: 105.6 LBS

## 2024-06-03 PROCEDURE — 93798 PHYS/QHP OP CAR RHAB W/ECG: CPT

## 2024-06-03 ASSESSMENT — EXERCISE STRESS TEST
PEAK_RPE: 11
PEAK_HR: 97
PEAK_METS: 2.9

## 2024-06-04 ENCOUNTER — TRANSCRIBE ORDERS (OUTPATIENT)
Facility: HOSPITAL | Age: 74
End: 2024-06-04

## 2024-06-04 DIAGNOSIS — Z12.31 SCREENING MAMMOGRAM FOR BREAST CANCER: Primary | ICD-10-CM

## 2024-06-05 ENCOUNTER — HOSPITAL ENCOUNTER (OUTPATIENT)
Facility: HOSPITAL | Age: 74
Setting detail: RECURRING SERIES
Discharge: HOME | End: 2024-06-08
Payer: MEDICARE

## 2024-06-05 VITALS — WEIGHT: 105.2 LBS | BODY MASS INDEX: 18.64 KG/M2

## 2024-06-05 PROCEDURE — 93798 PHYS/QHP OP CAR RHAB W/ECG: CPT

## 2024-06-05 ASSESSMENT — EXERCISE STRESS TEST
PEAK_METS: 2.7
PEAK_RPE: 11
PEAK_HR: 103

## 2024-06-06 ENCOUNTER — OFFICE VISIT (OUTPATIENT)
Age: 74
End: 2024-06-06
Payer: MEDICARE

## 2024-06-06 VITALS
OXYGEN SATURATION: 95 % | TEMPERATURE: 97.6 F | SYSTOLIC BLOOD PRESSURE: 131 MMHG | BODY MASS INDEX: 18.36 KG/M2 | WEIGHT: 103.6 LBS | HEIGHT: 63 IN | HEART RATE: 84 BPM | RESPIRATION RATE: 16 BRPM | DIASTOLIC BLOOD PRESSURE: 85 MMHG

## 2024-06-06 DIAGNOSIS — G47.09 OTHER INSOMNIA: Primary | ICD-10-CM

## 2024-06-06 DIAGNOSIS — E78.5 DYSLIPIDEMIA, GOAL LDL BELOW 100: ICD-10-CM

## 2024-06-06 DIAGNOSIS — D64.9 NORMOCYTIC ANEMIA: ICD-10-CM

## 2024-06-06 DIAGNOSIS — R73.01 IFG (IMPAIRED FASTING GLUCOSE): ICD-10-CM

## 2024-06-06 DIAGNOSIS — I50.32 CHRONIC HEART FAILURE WITH PRESERVED EJECTION FRACTION (HFPEF) (HCC): ICD-10-CM

## 2024-06-06 DIAGNOSIS — R63.4 WEIGHT LOSS: ICD-10-CM

## 2024-06-06 DIAGNOSIS — I48.0 PAROXYSMAL ATRIAL FIBRILLATION (HCC): ICD-10-CM

## 2024-06-06 DIAGNOSIS — J90 PLEURAL EFFUSION, RIGHT: ICD-10-CM

## 2024-06-06 DIAGNOSIS — N64.89 BREAST ASYMMETRY: ICD-10-CM

## 2024-06-06 DIAGNOSIS — Z00.00 MEDICARE ANNUAL WELLNESS VISIT, SUBSEQUENT: ICD-10-CM

## 2024-06-06 LAB
ALBUMIN SERPL-MCNC: 3.5 G/DL (ref 3.5–5)
ALBUMIN/GLOB SERPL: 1 (ref 1.1–2.2)
ALP SERPL-CCNC: 134 U/L (ref 45–117)
ALT SERPL-CCNC: 36 U/L (ref 12–78)
ANION GAP SERPL CALC-SCNC: 6 MMOL/L (ref 5–15)
AST SERPL-CCNC: 33 U/L (ref 15–37)
BASOPHILS # BLD: 0.1 K/UL (ref 0–0.1)
BASOPHILS NFR BLD: 1 % (ref 0–1)
BILIRUB SERPL-MCNC: 0.4 MG/DL (ref 0.2–1)
BUN SERPL-MCNC: 13 MG/DL (ref 6–20)
BUN/CREAT SERPL: 17 (ref 12–20)
CALCIUM SERPL-MCNC: 9.6 MG/DL (ref 8.5–10.1)
CHLORIDE SERPL-SCNC: 101 MMOL/L (ref 97–108)
CHOLEST SERPL-MCNC: 142 MG/DL
CO2 SERPL-SCNC: 30 MMOL/L (ref 21–32)
CREAT SERPL-MCNC: 0.75 MG/DL (ref 0.55–1.02)
DIFFERENTIAL METHOD BLD: ABNORMAL
EOSINOPHIL # BLD: 0 K/UL (ref 0–0.4)
EOSINOPHIL NFR BLD: 1 % (ref 0–7)
ERYTHROCYTE [DISTWIDTH] IN BLOOD BY AUTOMATED COUNT: 16.4 % (ref 11.5–14.5)
EST. AVERAGE GLUCOSE BLD GHB EST-MCNC: 111 MG/DL
GLOBULIN SER CALC-MCNC: 3.4 G/DL (ref 2–4)
GLUCOSE SERPL-MCNC: 96 MG/DL (ref 65–100)
HBA1C MFR BLD: 5.5 % (ref 4–5.6)
HCT VFR BLD AUTO: 35.8 % (ref 35–47)
HDLC SERPL-MCNC: 75 MG/DL
HDLC SERPL: 1.9 (ref 0–5)
HGB BLD-MCNC: 11.4 G/DL (ref 11.5–16)
IMM GRANULOCYTES # BLD AUTO: 0 K/UL (ref 0–0.04)
IMM GRANULOCYTES NFR BLD AUTO: 0 % (ref 0–0.5)
LDLC SERPL CALC-MCNC: 47.6 MG/DL (ref 0–100)
LYMPHOCYTES # BLD: 1.1 K/UL (ref 0.8–3.5)
LYMPHOCYTES NFR BLD: 13 % (ref 12–49)
MCH RBC QN AUTO: 29.6 PG (ref 26–34)
MCHC RBC AUTO-ENTMCNC: 31.8 G/DL (ref 30–36.5)
MCV RBC AUTO: 93 FL (ref 80–99)
MONOCYTES # BLD: 0.8 K/UL (ref 0–1)
MONOCYTES NFR BLD: 9 % (ref 5–13)
NEUTS SEG # BLD: 6.5 K/UL (ref 1.8–8)
NEUTS SEG NFR BLD: 76 % (ref 32–75)
NRBC # BLD: 0 K/UL (ref 0–0.01)
NRBC BLD-RTO: 0 PER 100 WBC
PLATELET # BLD AUTO: 297 K/UL (ref 150–400)
PMV BLD AUTO: 10.1 FL (ref 8.9–12.9)
POTASSIUM SERPL-SCNC: 4.3 MMOL/L (ref 3.5–5.1)
PROT SERPL-MCNC: 6.9 G/DL (ref 6.4–8.2)
RBC # BLD AUTO: 3.85 M/UL (ref 3.8–5.2)
SODIUM SERPL-SCNC: 137 MMOL/L (ref 136–145)
TRIGL SERPL-MCNC: 97 MG/DL
VLDLC SERPL CALC-MCNC: 19.4 MG/DL
WBC # BLD AUTO: 8.5 K/UL (ref 3.6–11)

## 2024-06-06 PROCEDURE — G8419 CALC BMI OUT NRM PARAM NOF/U: HCPCS | Performed by: INTERNAL MEDICINE

## 2024-06-06 PROCEDURE — G0439 PPPS, SUBSEQ VISIT: HCPCS | Performed by: INTERNAL MEDICINE

## 2024-06-06 PROCEDURE — 99214 OFFICE O/P EST MOD 30 MIN: CPT | Performed by: INTERNAL MEDICINE

## 2024-06-06 PROCEDURE — G8399 PT W/DXA RESULTS DOCUMENT: HCPCS | Performed by: INTERNAL MEDICINE

## 2024-06-06 PROCEDURE — 3079F DIAST BP 80-89 MM HG: CPT | Performed by: INTERNAL MEDICINE

## 2024-06-06 PROCEDURE — 3017F COLORECTAL CA SCREEN DOC REV: CPT | Performed by: INTERNAL MEDICINE

## 2024-06-06 PROCEDURE — 3075F SYST BP GE 130 - 139MM HG: CPT | Performed by: INTERNAL MEDICINE

## 2024-06-06 PROCEDURE — G8427 DOCREV CUR MEDS BY ELIG CLIN: HCPCS | Performed by: INTERNAL MEDICINE

## 2024-06-06 PROCEDURE — 1036F TOBACCO NON-USER: CPT | Performed by: INTERNAL MEDICINE

## 2024-06-06 PROCEDURE — 1090F PRES/ABSN URINE INCON ASSESS: CPT | Performed by: INTERNAL MEDICINE

## 2024-06-06 PROCEDURE — 1123F ACP DISCUSS/DSCN MKR DOCD: CPT | Performed by: INTERNAL MEDICINE

## 2024-06-06 ASSESSMENT — PATIENT HEALTH QUESTIONNAIRE - PHQ9
SUM OF ALL RESPONSES TO PHQ9 QUESTIONS 1 & 2: 0
SUM OF ALL RESPONSES TO PHQ QUESTIONS 1-9: 0
1. LITTLE INTEREST OR PLEASURE IN DOING THINGS: NOT AT ALL
SUM OF ALL RESPONSES TO PHQ QUESTIONS 1-9: 0
2. FEELING DOWN, DEPRESSED OR HOPELESS: NOT AT ALL

## 2024-06-06 ASSESSMENT — LIFESTYLE VARIABLES
HOW OFTEN DO YOU HAVE A DRINK CONTAINING ALCOHOL: 2-3 TIMES A WEEK
HOW MANY STANDARD DRINKS CONTAINING ALCOHOL DO YOU HAVE ON A TYPICAL DAY: 1 OR 2

## 2024-06-06 NOTE — PATIENT INSTRUCTIONS
Please decrease the sertraline to 25 mg every other day  After 2 weeks if you feel well it is ok to stop this medication  We will check your potassium  and the iron levels today       Learning About Vision Tests  What are vision tests?     The four most common vision tests are visual acuity tests, refraction, visual field tests, and color vision tests.  Visual acuity (sharpness) tests  These tests are used:  To see if you need glasses or contact lenses.  To monitor an eye problem.  To check an eye injury.  Visual acuity tests are done as part of routine exams. You may also have this test when you get your 's license or apply for some types of jobs.  Visual field tests  These tests are used:  To check for vision loss in any area of your range of vision.  To screen for certain eye diseases.  To look for nerve damage after a stroke, head injury, or other problem that could reduce blood flow to the brain.  Refraction and color tests  A refraction test is done to find the right prescription for glasses and contact lenses.  A color vision test is done to check for color blindness.  Color vision is often tested as part of a routine exam. You may also have this test when you apply for a job where recognizing different colors is important, such as , electronics, or the .  How are vision tests done?  Visual acuity test   You cover one eye at a time.  You read aloud from a wall chart across the room.  You read aloud from a small card that you hold in your hand.  Refraction   You look into a special device.  The device puts lenses of different strengths in front of each eye to see how strong your glasses or contact lenses need to be.  Visual field tests   Your doctor may have you look through special machines.  Or your doctor may simply have you stare straight ahead while they move a finger into and out of your field of vision.  Color vision test   You look at pieces of printed test patterns in

## 2024-06-06 NOTE — PROGRESS NOTES
Alicia Torres (:  1950) is a 74 y.o. female,Established patient, here for evaluation of the following chief complaint(s):  Medicare AWV (Patient is nonfasting)      Assessment & Plan   1. Other insomnia-seems resolved now-will titrate down on zoloft 25 mg qod for 2 weeks then stop  2. Weight loss-had about 10 pounds of weight loss after mvr but in last 20 days weight at home stable at 101-continue to try to increase intake and continue to monitor  3. Chronic heart failure with preserved ejection fraction (HFpEF) (HCC)-seems well compensated on lasix 40 mg   4. Normocytic anemia-on iron repeat levels  -     CBC with Auto Differential; Future  5. Breast asymmetry-noted during hospital on chest ct  No findings on exam and  mammogram set up for august  6. IFG (impaired fasting glucose)  -     Hemoglobin A1C; Future  7. Dyslipidemia, goal LDL below 100  -     Lipid Panel; Future  -     Comprehensive Metabolic Panel; Future  8. Pleural effusion, right-no sob  Has followup chest ct next week  9. Paroxysmal atrial fibrillation (HCC)-will see dr gomez to discuss stopping the eliquis as had left  atrial appendage ligated      No follow-ups on file.       Subjective   HPI  Seen for wellness visit and follow-up on issues.  Overall she is much improved from her last visit.  Has been going to cardiac rehab and notes gradual improvement in strength.  Had lost a little over 10 pounds when she first came out of the hospital but over the last 12 days on her records her weight has stabilized at home and around 101.  She has really no signs of volume overload.  No significant shortness of breath or persistent chest pain hopes to transition to [facility] when she is done with cardiac rehab.    Insomnia immediately postop this is much better and now essentially every night she endorses good sleep would like to stop the Zoloft we will titrate to every other day for 2 weeks and then off.    Previous iron deficiency anemia remains

## 2024-06-07 ENCOUNTER — HOSPITAL ENCOUNTER (OUTPATIENT)
Facility: HOSPITAL | Age: 74
Setting detail: RECURRING SERIES
Discharge: HOME | End: 2024-06-10
Payer: MEDICARE

## 2024-06-07 VITALS — WEIGHT: 104.6 LBS | BODY MASS INDEX: 18.53 KG/M2

## 2024-06-07 PROCEDURE — 93798 PHYS/QHP OP CAR RHAB W/ECG: CPT

## 2024-06-07 ASSESSMENT — EXERCISE STRESS TEST
PEAK_METS: 2.9
PEAK_HR: 91
PEAK_RPE: 11

## 2024-06-10 ENCOUNTER — HOSPITAL ENCOUNTER (OUTPATIENT)
Facility: HOSPITAL | Age: 74
Discharge: HOME | End: 2024-06-13
Attending: INTERNAL MEDICINE
Payer: MEDICARE

## 2024-06-10 DIAGNOSIS — R93.89 ABNORMAL CT SCAN: ICD-10-CM

## 2024-06-10 PROCEDURE — 71250 CT THORAX DX C-: CPT

## 2024-06-11 ENCOUNTER — HOSPITAL ENCOUNTER (OUTPATIENT)
Facility: HOSPITAL | Age: 74
Setting detail: RECURRING SERIES
Discharge: HOME | End: 2024-06-14
Payer: MEDICARE

## 2024-06-11 VITALS — WEIGHT: 103.8 LBS | BODY MASS INDEX: 18.39 KG/M2

## 2024-06-11 PROCEDURE — 93798 PHYS/QHP OP CAR RHAB W/ECG: CPT

## 2024-06-11 ASSESSMENT — LIFESTYLE VARIABLES
ALCOHOL_TYPE: WINE
ALCOHOL_USE: SPECIAL
ALCOHOL_AMOUNT: 1

## 2024-06-11 ASSESSMENT — EXERCISE STRESS TEST
PEAK_RPE: 11
PEAK_METS: 2.9
PEAK_HR: 95

## 2024-06-11 ASSESSMENT — EJECTION FRACTION: EF_VALUE: 55

## 2024-06-13 ENCOUNTER — HOSPITAL ENCOUNTER (OUTPATIENT)
Facility: HOSPITAL | Age: 74
Setting detail: RECURRING SERIES
Discharge: HOME | End: 2024-06-16
Payer: MEDICARE

## 2024-06-13 VITALS — WEIGHT: 103 LBS | BODY MASS INDEX: 18.25 KG/M2

## 2024-06-13 PROCEDURE — 93798 PHYS/QHP OP CAR RHAB W/ECG: CPT

## 2024-06-13 ASSESSMENT — EXERCISE STRESS TEST
PEAK_METS: 2.9
PEAK_RPE: 11
PEAK_HR: 97

## 2024-06-14 ENCOUNTER — HOSPITAL ENCOUNTER (OUTPATIENT)
Facility: HOSPITAL | Age: 74
Setting detail: RECURRING SERIES
Discharge: HOME | End: 2024-06-17
Payer: MEDICARE

## 2024-06-14 VITALS — WEIGHT: 103.6 LBS | BODY MASS INDEX: 18.35 KG/M2

## 2024-06-14 PROCEDURE — 93798 PHYS/QHP OP CAR RHAB W/ECG: CPT

## 2024-06-14 ASSESSMENT — EXERCISE STRESS TEST
PEAK_HR: 98
PEAK_METS: 2.9
PEAK_RPE: 12

## 2024-06-18 ENCOUNTER — HOSPITAL ENCOUNTER (OUTPATIENT)
Facility: HOSPITAL | Age: 74
Setting detail: RECURRING SERIES
Discharge: HOME | End: 2024-06-21
Payer: MEDICARE

## 2024-06-18 VITALS — WEIGHT: 105.4 LBS | BODY MASS INDEX: 18.67 KG/M2

## 2024-06-18 PROCEDURE — 93798 PHYS/QHP OP CAR RHAB W/ECG: CPT

## 2024-06-18 ASSESSMENT — EXERCISE STRESS TEST
PEAK_RPE: 12
PEAK_METS: 2.9
PEAK_HR: 99

## 2024-06-19 ENCOUNTER — HOSPITAL ENCOUNTER (OUTPATIENT)
Facility: HOSPITAL | Age: 74
Setting detail: RECURRING SERIES
Discharge: HOME | End: 2024-06-22
Payer: MEDICARE

## 2024-06-19 VITALS — BODY MASS INDEX: 18.78 KG/M2 | WEIGHT: 106 LBS

## 2024-06-19 PROCEDURE — 93798 PHYS/QHP OP CAR RHAB W/ECG: CPT

## 2024-06-19 PROCEDURE — 93797 PHYS/QHP OP CAR RHAB WO ECG: CPT

## 2024-06-19 ASSESSMENT — LIFESTYLE VARIABLES
ALCOHOL_USE: SPECIAL
ALCOHOL_TYPE: WINE
ALCOHOL_AMOUNT: 1

## 2024-06-19 ASSESSMENT — EXERCISE STRESS TEST
PEAK_METS: 2.9
PEAK_RPE: 12
PEAK_HR: 92

## 2024-06-19 NOTE — PROGRESS NOTES
Cardiac Rehab Nutrition Assessment- 1:1 Evaluation  2024      NAME: Alicia Torres : 1950 AGE: 74 y.o.  GENDER: female  CARDIAC REHAB ADMITTING DIAGNOSIS: Other specified postprocedural states [Z98.890]    PROBLEM LIST:  Patient Active Problem List   Diagnosis    Impaired glucose tolerance    HTN, goal below 130/80    Dyslipidemia    PAF (paroxysmal atrial fibrillation) (HCC)    NAFLD (nonalcoholic fatty liver disease)    Nonrheumatic mitral valve regurgitation    Osteopenia    Pyuria    Hypoxia    Junctional bradycardia    Bilateral pleural effusion    Hypokalemia    Hypomagnesemia    Anemia    Breast asymmetry    Dyspnea    Thoracic aortic ectasia (HCC)    Pacemaker    S/P MVR (mitral valve repair)    H/O maze procedure    Secondary hypercoagulable state ([facility])         LABS:   Hemoglobin A1C   Date Value Ref Range Status   2024 5.5 4.0 - 5.6 % Final     Comment:     (NOTE)  HbA1C Interpretive Ranges  <5.7              Normal  5.7 - 6.4         Consider Prediabetes  >6.5              Consider Diabetes       Lab Results   Component Value Date     2024    K 4.3 2024     2024    CO2 30 2024    BUN 13 2024    CREATININE 0.75 2024    GLUCOSE 96 2024    CALCIUM 9.6 2024    BILITOT 0.4 2024    ALKPHOS 134 (H) 2024    AST 33 2024    ALT 36 2024    LABGLOM 84 2024    GFRAA >60 2022    AGRATIO 1.4 2023    GLOB 3.4 2024     Lab Results   Component Value Date    CHOL 142 2024    TRIG 97 2024    HDL 75 2024    LDL 47.6 2024    VLDL 19.4 2024    CHOLHDLRATIO 1.9 2024     Lab Results   Component Value Date/Time    VITD25 53.9 2023 03:50 PM       Lab Results   Component Value Date    QLKXCKDH79 1420 (H) 2023     MEDICATIONS/SUPPLEMENTS:   Scheduled Meds:  Continuous Infusions:  PRN Meds:.  Prior to Admission medications    Medication Sig Start

## 2024-06-21 ENCOUNTER — HOSPITAL ENCOUNTER (OUTPATIENT)
Facility: HOSPITAL | Age: 74
Setting detail: RECURRING SERIES
Discharge: HOME | End: 2024-06-24
Payer: MEDICARE

## 2024-06-21 VITALS — BODY MASS INDEX: 18.6 KG/M2 | WEIGHT: 105 LBS

## 2024-06-21 PROCEDURE — 93798 PHYS/QHP OP CAR RHAB W/ECG: CPT

## 2024-06-21 ASSESSMENT — EXERCISE STRESS TEST
PEAK_RPE: 12
PEAK_HR: 100
PEAK_METS: 2.9

## 2024-06-25 ENCOUNTER — HOSPITAL ENCOUNTER (OUTPATIENT)
Facility: HOSPITAL | Age: 74
Setting detail: RECURRING SERIES
Discharge: HOME | End: 2024-06-28
Payer: MEDICARE

## 2024-06-25 VITALS — WEIGHT: 104 LBS | BODY MASS INDEX: 18.42 KG/M2

## 2024-06-25 PROCEDURE — 93798 PHYS/QHP OP CAR RHAB W/ECG: CPT

## 2024-06-25 ASSESSMENT — EXERCISE STRESS TEST
PEAK_RPE: 12
PEAK_HR: 96
PEAK_METS: 3.2

## 2024-06-26 ENCOUNTER — APPOINTMENT (OUTPATIENT)
Facility: HOSPITAL | Age: 74
End: 2024-06-26
Payer: MEDICARE

## 2024-06-26 RX ORDER — ATORVASTATIN CALCIUM 20 MG/1
20 TABLET, FILM COATED ORAL DAILY
Qty: 90 TABLET | Refills: 3 | Status: SHIPPED | OUTPATIENT
Start: 2024-06-26

## 2024-06-27 ENCOUNTER — HOSPITAL ENCOUNTER (OUTPATIENT)
Facility: HOSPITAL | Age: 74
Setting detail: RECURRING SERIES
Discharge: HOME | End: 2024-06-30
Payer: MEDICARE

## 2024-06-27 VITALS — BODY MASS INDEX: 18.28 KG/M2 | WEIGHT: 103.2 LBS

## 2024-06-27 PROCEDURE — 93798 PHYS/QHP OP CAR RHAB W/ECG: CPT

## 2024-06-27 RX ORDER — FUROSEMIDE 40 MG/1
40 TABLET ORAL DAILY
Qty: 90 TABLET | Refills: 0 | Status: SHIPPED | OUTPATIENT
Start: 2024-06-27 | End: 2024-09-25

## 2024-06-27 ASSESSMENT — EXERCISE STRESS TEST
PEAK_METS: 3.2
PEAK_HR: 97
PEAK_RPE: 12

## 2024-06-28 ENCOUNTER — HOSPITAL ENCOUNTER (OUTPATIENT)
Facility: HOSPITAL | Age: 74
Setting detail: RECURRING SERIES
Discharge: HOME | End: 2024-07-01
Payer: MEDICARE

## 2024-06-28 VITALS — BODY MASS INDEX: 18.28 KG/M2 | WEIGHT: 103.2 LBS

## 2024-06-28 PROCEDURE — 93798 PHYS/QHP OP CAR RHAB W/ECG: CPT

## 2024-06-28 ASSESSMENT — EXERCISE STRESS TEST
PEAK_RPE: 12
PEAK_HR: 106
PEAK_METS: 3.2

## 2024-07-01 ENCOUNTER — HOSPITAL ENCOUNTER (OUTPATIENT)
Facility: HOSPITAL | Age: 74
Setting detail: RECURRING SERIES
Discharge: HOME | End: 2024-07-04
Payer: MEDICARE

## 2024-07-01 VITALS — BODY MASS INDEX: 18.42 KG/M2 | WEIGHT: 104 LBS

## 2024-07-01 ASSESSMENT — EXERCISE STRESS TEST
PEAK_RPE: 13
PEAK_METS: 3.2
PEAK_HR: 108

## 2024-07-03 ENCOUNTER — HOSPITAL ENCOUNTER (OUTPATIENT)
Facility: HOSPITAL | Age: 74
Setting detail: RECURRING SERIES
Discharge: HOME | End: 2024-07-06
Payer: MEDICARE

## 2024-07-03 VITALS — WEIGHT: 104 LBS | BODY MASS INDEX: 18.42 KG/M2

## 2024-07-03 PROCEDURE — 93798 PHYS/QHP OP CAR RHAB W/ECG: CPT

## 2024-07-03 ASSESSMENT — LIFESTYLE VARIABLES
ALCOHOL_USE: SPECIAL
ALCOHOL_TYPE: WINE
ALCOHOL_AMOUNT: 1

## 2024-07-03 ASSESSMENT — EJECTION FRACTION: EF_VALUE: 55

## 2024-07-03 ASSESSMENT — EXERCISE STRESS TEST
PEAK_RPE: 13
PEAK_HR: 95
PEAK_METS: 3.7

## 2024-07-10 ENCOUNTER — HOSPITAL ENCOUNTER (OUTPATIENT)
Facility: HOSPITAL | Age: 74
Setting detail: RECURRING SERIES
Discharge: HOME | End: 2024-07-13
Payer: MEDICARE

## 2024-07-10 VITALS — WEIGHT: 103.6 LBS | BODY MASS INDEX: 18.35 KG/M2

## 2024-07-10 PROCEDURE — 93798 PHYS/QHP OP CAR RHAB W/ECG: CPT

## 2024-07-10 ASSESSMENT — EXERCISE STRESS TEST
PEAK_RPE: 13
PEAK_METS: 3.5
PEAK_HR: 107

## 2024-07-17 RX ORDER — POTASSIUM CHLORIDE 20 MEQ/1
20 TABLET, EXTENDED RELEASE ORAL DAILY
Qty: 30 TABLET | Refills: 2 | Status: SHIPPED | OUTPATIENT
Start: 2024-07-17

## 2024-07-26 ENCOUNTER — OFFICE VISIT (OUTPATIENT)
Age: 74
End: 2024-07-26
Payer: MEDICARE

## 2024-07-26 VITALS
HEIGHT: 63 IN | WEIGHT: 98.4 LBS | HEART RATE: 75 BPM | DIASTOLIC BLOOD PRESSURE: 94 MMHG | SYSTOLIC BLOOD PRESSURE: 155 MMHG | RESPIRATION RATE: 16 BRPM | BODY MASS INDEX: 17.43 KG/M2 | OXYGEN SATURATION: 96 %

## 2024-07-26 DIAGNOSIS — R63.4 WEIGHT LOSS: ICD-10-CM

## 2024-07-26 DIAGNOSIS — I10 HTN, GOAL BELOW 130/80: ICD-10-CM

## 2024-07-26 DIAGNOSIS — R05.1 ACUTE COUGH: ICD-10-CM

## 2024-07-26 DIAGNOSIS — U07.1 COVID-19: Primary | ICD-10-CM

## 2024-07-26 DIAGNOSIS — Z98.890 S/P MVR (MITRAL VALVE REPAIR): ICD-10-CM

## 2024-07-26 PROBLEM — D68.69 SECONDARY HYPERCOAGULABLE STATE (HCC): Status: RESOLVED | Noted: 2024-05-09 | Resolved: 2024-07-26

## 2024-07-26 LAB
LOT EXPIRE DATE: ABNORMAL
LOT KIT NUMBER: ABNORMAL
SARS-COV-2, POC: DETECTED
VALID INTERNAL CONTROL: YES
VENDOR AND KIT NAME POC: ABNORMAL

## 2024-07-26 PROCEDURE — G8419 CALC BMI OUT NRM PARAM NOF/U: HCPCS | Performed by: INTERNAL MEDICINE

## 2024-07-26 PROCEDURE — PBSHW AMB POC SARS-COV-2: Performed by: INTERNAL MEDICINE

## 2024-07-26 PROCEDURE — G8399 PT W/DXA RESULTS DOCUMENT: HCPCS | Performed by: INTERNAL MEDICINE

## 2024-07-26 PROCEDURE — 1036F TOBACCO NON-USER: CPT | Performed by: INTERNAL MEDICINE

## 2024-07-26 PROCEDURE — G8427 DOCREV CUR MEDS BY ELIG CLIN: HCPCS | Performed by: INTERNAL MEDICINE

## 2024-07-26 PROCEDURE — 1123F ACP DISCUSS/DSCN MKR DOCD: CPT | Performed by: INTERNAL MEDICINE

## 2024-07-26 PROCEDURE — 1090F PRES/ABSN URINE INCON ASSESS: CPT | Performed by: INTERNAL MEDICINE

## 2024-07-26 PROCEDURE — 3080F DIAST BP >= 90 MM HG: CPT | Performed by: INTERNAL MEDICINE

## 2024-07-26 PROCEDURE — 87426 SARSCOV CORONAVIRUS AG IA: CPT | Performed by: INTERNAL MEDICINE

## 2024-07-26 PROCEDURE — 99214 OFFICE O/P EST MOD 30 MIN: CPT | Performed by: INTERNAL MEDICINE

## 2024-07-26 PROCEDURE — 3077F SYST BP >= 140 MM HG: CPT | Performed by: INTERNAL MEDICINE

## 2024-07-26 PROCEDURE — 3017F COLORECTAL CA SCREEN DOC REV: CPT | Performed by: INTERNAL MEDICINE

## 2024-07-26 RX ORDER — AZITHROMYCIN 250 MG/1
TABLET, FILM COATED ORAL
Qty: 6 TABLET | Refills: 0 | Status: SHIPPED | OUTPATIENT
Start: 2024-07-26 | End: 2024-08-05

## 2024-07-26 RX ORDER — BENZONATATE 100 MG/1
100 CAPSULE ORAL 3 TIMES DAILY PRN
Qty: 30 CAPSULE | Refills: 0 | Status: SHIPPED | OUTPATIENT
Start: 2024-07-26 | End: 2024-08-05

## 2024-07-26 ASSESSMENT — ENCOUNTER SYMPTOMS: COUGH: 1

## 2024-07-26 NOTE — PROGRESS NOTES
Alicia Torres (:  1950) is a 74 y.o. female,Established patient, here for evaluation of the following chief complaint(s):  Cough (Cough started on ; coughing up greenish mucus, no fever; per patient states she is wheezing; no other sx )      Assessment & Plan   1. COVID-19-sxs started 6 days ago so out of window to benefit from paxlovid-also on eliquis so risks seem greater than benefits  I do worry about secondary bronchitis  Start abx  Appt in 3 days if temp over 100.4 or any progressive sob or cp  Discussed fluids and food intake given weight loss in last few weeks  -     benzonatate (TESSALON) 100 MG capsule; Take 1 capsule by mouth 3 times daily as needed for Cough, Disp-30 capsule, R-0Normal  -     azithromycin (ZITHROMAX) 250 MG tablet; 500mg on day 1 followed by 250mg on days 2 - 5, Disp-6 tablet, R-0Normal  2. Acute cough  -     AMB POC SARS-COV-2  3. S/P MVR (mitral valve repair)  4. HTN, goal below 130/80      No follow-ups on file.       Subjective   Cough      She was in [place] on vacation last week notes on  began to feel fatigued decrease in appetite and cough phlegm is sometimes white sometimes green-tinged there is been no blood streaks.  She has had no fevers or chills and does not feel short of breath does feel quite exhausted tried some Coricidin HBP but it made her feel a little loopy.  Appetite has been diminished for some time but has gotten worse with the recent illness.  1 episode of loose stools but no persistent diarrhea.  No abdominal pain no nausea or vomiting.  No chest pain.  Review of Systems   Respiratory:  Positive for cough.           Objective   Physical Exam  Constitutional:       Comments: Looks tired but nad  Speaks  in full sentences and not visibly sob   HENT:      Head: Normocephalic and atraumatic.      Right Ear: Tympanic membrane and ear canal normal.      Left Ear: Tympanic membrane and ear canal normal.   Cardiovascular:      Rate and Rhythm:

## 2024-07-28 ENCOUNTER — TELEPHONE (OUTPATIENT)
Age: 74
End: 2024-07-28

## 2024-07-30 ENCOUNTER — APPOINTMENT (OUTPATIENT)
Facility: HOSPITAL | Age: 74
End: 2024-07-30
Payer: MEDICARE

## 2024-08-07 ENCOUNTER — HOSPITAL ENCOUNTER (OUTPATIENT)
Facility: HOSPITAL | Age: 74
Setting detail: RECURRING SERIES
Discharge: HOME | End: 2024-08-10
Payer: MEDICARE

## 2024-08-07 VITALS — BODY MASS INDEX: 17.89 KG/M2 | WEIGHT: 101 LBS

## 2024-08-07 PROCEDURE — 93798 PHYS/QHP OP CAR RHAB W/ECG: CPT

## 2024-08-07 ASSESSMENT — EXERCISE STRESS TEST
PEAK_RPE: 13
PEAK_HR: 93
PEAK_METS: 3.7

## 2024-08-09 ENCOUNTER — OFFICE VISIT (OUTPATIENT)
Age: 74
End: 2024-08-09
Payer: MEDICARE

## 2024-08-09 VITALS
HEART RATE: 81 BPM | DIASTOLIC BLOOD PRESSURE: 68 MMHG | HEIGHT: 63 IN | WEIGHT: 101 LBS | OXYGEN SATURATION: 98 % | BODY MASS INDEX: 17.89 KG/M2 | SYSTOLIC BLOOD PRESSURE: 148 MMHG

## 2024-08-09 DIAGNOSIS — I48.0 PAF (PAROXYSMAL ATRIAL FIBRILLATION) (HCC): Primary | ICD-10-CM

## 2024-08-09 DIAGNOSIS — I34.0 NONRHEUMATIC MITRAL VALVE REGURGITATION: ICD-10-CM

## 2024-08-09 DIAGNOSIS — Z98.890 H/O MAZE PROCEDURE: ICD-10-CM

## 2024-08-09 DIAGNOSIS — Z98.890 S/P MVR (MITRAL VALVE REPAIR): ICD-10-CM

## 2024-08-09 DIAGNOSIS — I50.32 CHRONIC DIASTOLIC HEART FAILURE (HCC): ICD-10-CM

## 2024-08-09 PROCEDURE — 99214 OFFICE O/P EST MOD 30 MIN: CPT | Performed by: SPECIALIST

## 2024-08-09 PROCEDURE — 1123F ACP DISCUSS/DSCN MKR DOCD: CPT | Performed by: SPECIALIST

## 2024-08-09 PROCEDURE — 3078F DIAST BP <80 MM HG: CPT | Performed by: SPECIALIST

## 2024-08-09 PROCEDURE — G8419 CALC BMI OUT NRM PARAM NOF/U: HCPCS | Performed by: SPECIALIST

## 2024-08-09 PROCEDURE — 3077F SYST BP >= 140 MM HG: CPT | Performed by: SPECIALIST

## 2024-08-09 PROCEDURE — 1090F PRES/ABSN URINE INCON ASSESS: CPT | Performed by: SPECIALIST

## 2024-08-09 PROCEDURE — 1036F TOBACCO NON-USER: CPT | Performed by: SPECIALIST

## 2024-08-09 PROCEDURE — G8427 DOCREV CUR MEDS BY ELIG CLIN: HCPCS | Performed by: SPECIALIST

## 2024-08-09 PROCEDURE — 3017F COLORECTAL CA SCREEN DOC REV: CPT | Performed by: SPECIALIST

## 2024-08-09 PROCEDURE — G8399 PT W/DXA RESULTS DOCUMENT: HCPCS | Performed by: SPECIALIST

## 2024-08-09 RX ORDER — POTASSIUM CHLORIDE 20 MEQ/1
10 TABLET, EXTENDED RELEASE ORAL PRN
Qty: 45 TABLET | Refills: 3 | Status: SHIPPED | OUTPATIENT
Start: 2024-08-09

## 2024-08-09 RX ORDER — FUROSEMIDE 40 MG/1
20 TABLET ORAL PRN
Qty: 45 TABLET | Refills: 3 | Status: SHIPPED | OUTPATIENT
Start: 2024-08-09

## 2024-08-09 RX ORDER — AMOXICILLIN 500 MG/1
2000 CAPSULE ORAL PRN
Qty: 4 CAPSULE | Refills: 3 | Status: SHIPPED | OUTPATIENT
Start: 2024-08-09

## 2024-08-09 NOTE — PROGRESS NOTES
Tano Dailey MD. [facility]          Patient: Alicia Torres  : 1950      Today's Date: 2024        HISTORY OF PRESENT ILLNESS:     History of Present Illness:    Recent admission for CHF  Note states --   Alicia Torres is a 74 y.o. female with PMH significant for  hypertension, hyperlipidemia, mitral valve prolapse s/p mitral valve annuloplasty @ [facility] 2024 radial reconstruction, with ring (32 mm Hargrove Physio II complete annuloplasty band with ring with P2 triangular resection, P1-P2 cleft closure, P2-P3 cleft closure), Operative tissue ablation and reconstruction of atria, performed at the time of other cardiac procedure. EXTENSIVE (Santana Maze IX), Exclusion of left atrial appendage concomitant, any method (35 mm Atricure Flex V AtriClip) subsequently post op developed  junctional bradycardia with 100% dependence on pacemaker via temporary epicardial wires. EP was consulted and decision was made to place permanent pacemaker     SOB  - Recent MV valvuloplasty + ring + MAZE + Atriclip 3/18/24 [facility] Dr. Hernandez - echo reviewed and shows normal LV fxn, mild MR sp repair.  Normal RVSP.  Given IV lasix with subtle improvement. Thoracentesis performed.  PT/OT rec'd home therapy.  She plans to start cardiac rehab at [facility].  FU with Dr. Hernandez Friday at [facility].  Atrial fibrillation - amio 200mg po QD, eliquis 5 BID.  Sp AtriClip.  ASA on post operatively.      Feeling OK overall- Still with class 2 TAYLOR.  No orthopnea.    Did well in [place] and then got COVID on the last day.       PAST MEDICAL HISTORY:     Past Medical History:   Diagnosis Date    Atrial fibrillation (HCC)     Dyslipidemia     H/O maze procedure     HTN, goal below 130/80 2015    Hx of bone density study 2016    Osteopenic    Hx of mammogram 07/10/2017    Negtaive     Impaired glucose tolerance 2015    Junctional bradycardia 2024    3/34 post op from heart surgery and mvr-pacemaker placed on 3/22/24    Mitral

## 2024-08-09 NOTE — PROGRESS NOTES
Chief Complaint   Patient presents with    Follow-up    Congestive Heart Failure    Atrial Fibrillation     Vitals:    08/09/24 1050 08/09/24 1058   BP: (!) 148/76 (!) 148/68   Site: Right Upper Arm Right Upper Arm   Position: Sitting Sitting   Cuff Size: Medium Adult Medium Adult   Pulse: 81    SpO2: 98%    Weight: 45.8 kg (101 lb)    Height: 1.6 m (5' 3\")        Chest pain NO     ER, urgent care, or hospitalized outside of [facility] since your last visit?  NO     Refills NO     COVID x14 days ago.  Feeling a little bit better, but still coughing.    Asking about one day stopping Lasix.  Taking every day.    Has been doing cards rehab.

## 2024-08-12 ENCOUNTER — HOSPITAL ENCOUNTER (OUTPATIENT)
Facility: HOSPITAL | Age: 74
Discharge: HOME | End: 2024-08-15
Attending: INTERNAL MEDICINE
Payer: MEDICARE

## 2024-08-12 DIAGNOSIS — Z12.31 SCREENING MAMMOGRAM FOR BREAST CANCER: ICD-10-CM

## 2024-08-12 PROCEDURE — 77067 SCR MAMMO BI INCL CAD: CPT

## 2024-08-13 ENCOUNTER — HOSPITAL ENCOUNTER (OUTPATIENT)
Facility: HOSPITAL | Age: 74
Setting detail: RECURRING SERIES
Discharge: HOME | End: 2024-08-16
Payer: MEDICARE

## 2024-08-13 VITALS — WEIGHT: 102.8 LBS | BODY MASS INDEX: 18.21 KG/M2

## 2024-08-13 PROCEDURE — 93798 PHYS/QHP OP CAR RHAB W/ECG: CPT

## 2024-08-13 ASSESSMENT — LIFESTYLE VARIABLES
ALCOHOL_TYPE: WINE
ALCOHOL_AMOUNT: 1
ALCOHOL_USE: SPECIAL

## 2024-08-13 ASSESSMENT — EXERCISE STRESS TEST
PEAK_HR: 96
PEAK_METS: 3.7
PEAK_RPE: 13

## 2024-08-13 ASSESSMENT — EJECTION FRACTION: EF_VALUE: 55

## 2024-08-15 ENCOUNTER — TRANSCRIBE ORDERS (OUTPATIENT)
Facility: HOSPITAL | Age: 74
End: 2024-08-15

## 2024-08-15 ENCOUNTER — HOSPITAL ENCOUNTER (OUTPATIENT)
Facility: HOSPITAL | Age: 74
End: 2024-08-15
Attending: INTERNAL MEDICINE
Payer: MEDICARE

## 2024-08-15 VITALS — WEIGHT: 102.8 LBS | HEIGHT: 63 IN | BODY MASS INDEX: 18.21 KG/M2

## 2024-08-15 DIAGNOSIS — R92.8 ABNORMAL MAMMOGRAM: ICD-10-CM

## 2024-08-15 DIAGNOSIS — R92.8 ABNORMAL MAMMOGRAM OF RIGHT BREAST: Primary | ICD-10-CM

## 2024-08-15 PROCEDURE — G0279 TOMOSYNTHESIS, MAMMO: HCPCS

## 2024-08-15 PROCEDURE — 76642 ULTRASOUND BREAST LIMITED: CPT

## 2024-08-16 ENCOUNTER — HOSPITAL ENCOUNTER (OUTPATIENT)
Facility: HOSPITAL | Age: 74
Setting detail: RECURRING SERIES
Discharge: HOME | End: 2024-08-19
Payer: MEDICARE

## 2024-08-16 VITALS — BODY MASS INDEX: 18.42 KG/M2 | WEIGHT: 104 LBS

## 2024-08-16 PROCEDURE — 93798 PHYS/QHP OP CAR RHAB W/ECG: CPT

## 2024-08-16 ASSESSMENT — EXERCISE STRESS TEST
PEAK_HR: 109
PEAK_METS: 3.7
PEAK_RPE: 13

## 2024-08-21 ENCOUNTER — HOSPITAL ENCOUNTER (OUTPATIENT)
Facility: HOSPITAL | Age: 74
Setting detail: RECURRING SERIES
Discharge: HOME | End: 2024-08-24
Payer: MEDICARE

## 2024-08-21 VITALS — BODY MASS INDEX: 18.25 KG/M2 | WEIGHT: 103 LBS

## 2024-08-21 PROCEDURE — 93798 PHYS/QHP OP CAR RHAB W/ECG: CPT

## 2024-08-21 PROCEDURE — 93797 PHYS/QHP OP CAR RHAB WO ECG: CPT

## 2024-08-21 ASSESSMENT — EXERCISE STRESS TEST
PEAK_RPE: 13
PEAK_METS: 3.7

## 2024-08-27 ENCOUNTER — HOSPITAL ENCOUNTER (OUTPATIENT)
Facility: HOSPITAL | Age: 74
Setting detail: RECURRING SERIES
Discharge: HOME | End: 2024-08-30
Payer: MEDICARE

## 2024-08-27 VITALS — WEIGHT: 101.6 LBS | BODY MASS INDEX: 18 KG/M2

## 2024-08-27 PROCEDURE — 93798 PHYS/QHP OP CAR RHAB W/ECG: CPT

## 2024-08-27 ASSESSMENT — EXERCISE STRESS TEST
PEAK_METS: 3.5
PEAK_RPE: 13
PEAK_HR: 108

## 2024-08-29 ENCOUNTER — HOSPITAL ENCOUNTER (OUTPATIENT)
Facility: HOSPITAL | Age: 74
Setting detail: RECURRING SERIES
End: 2024-08-29
Payer: MEDICARE

## 2024-08-29 ENCOUNTER — APPOINTMENT (OUTPATIENT)
Facility: HOSPITAL | Age: 74
End: 2024-08-29
Payer: MEDICARE

## 2024-08-29 VITALS — BODY MASS INDEX: 18.1 KG/M2 | WEIGHT: 102.2 LBS

## 2024-08-29 PROCEDURE — 93798 PHYS/QHP OP CAR RHAB W/ECG: CPT

## 2024-08-29 ASSESSMENT — EXERCISE STRESS TEST
PEAK_RPE: 13
PEAK_METS: 3.7
PEAK_HR: 111

## 2024-09-03 ENCOUNTER — HOSPITAL ENCOUNTER (OUTPATIENT)
Facility: HOSPITAL | Age: 74
Setting detail: RECURRING SERIES
Discharge: HOME | End: 2024-09-06
Payer: MEDICARE

## 2024-09-03 VITALS — WEIGHT: 101.8 LBS | BODY MASS INDEX: 18.03 KG/M2

## 2024-09-03 PROCEDURE — 93798 PHYS/QHP OP CAR RHAB W/ECG: CPT

## 2024-09-03 ASSESSMENT — EXERCISE STRESS TEST
PEAK_RPE: 13
PEAK_HR: 111
PEAK_METS: 3.7

## 2024-09-05 ENCOUNTER — HOSPITAL ENCOUNTER (OUTPATIENT)
Facility: HOSPITAL | Age: 74
Setting detail: RECURRING SERIES
Discharge: HOME | End: 2024-09-08
Payer: MEDICARE

## 2024-09-05 VITALS — WEIGHT: 103.2 LBS | BODY MASS INDEX: 18.28 KG/M2

## 2024-09-05 PROCEDURE — 93798 PHYS/QHP OP CAR RHAB W/ECG: CPT

## 2024-09-05 ASSESSMENT — PATIENT HEALTH QUESTIONNAIRE - PHQ9
SUM OF ALL RESPONSES TO PHQ QUESTIONS 1-9: 2
5. POOR APPETITE OR OVEREATING: NOT AT ALL
SUM OF ALL RESPONSES TO PHQ QUESTIONS 1-9: 2
8. MOVING OR SPEAKING SO SLOWLY THAT OTHER PEOPLE COULD HAVE NOTICED. OR THE OPPOSITE, BEING SO FIGETY OR RESTLESS THAT YOU HAVE BEEN MOVING AROUND A LOT MORE THAN USUAL: NOT AT ALL
7. TROUBLE CONCENTRATING ON THINGS, SUCH AS READING THE NEWSPAPER OR WATCHING TELEVISION: NOT AT ALL
4. FEELING TIRED OR HAVING LITTLE ENERGY: SEVERAL DAYS
SUM OF ALL RESPONSES TO PHQ QUESTIONS 1-9: 2
1. LITTLE INTEREST OR PLEASURE IN DOING THINGS: NOT AT ALL
10. IF YOU CHECKED OFF ANY PROBLEMS, HOW DIFFICULT HAVE THESE PROBLEMS MADE IT FOR YOU TO DO YOUR WORK, TAKE CARE OF THINGS AT HOME, OR GET ALONG WITH OTHER PEOPLE: NOT DIFFICULT AT ALL
9. THOUGHTS THAT YOU WOULD BE BETTER OFF DEAD, OR OF HURTING YOURSELF: NOT AT ALL
SUM OF ALL RESPONSES TO PHQ9 QUESTIONS 1 & 2: 0
3. TROUBLE FALLING OR STAYING ASLEEP: SEVERAL DAYS
SUM OF ALL RESPONSES TO PHQ QUESTIONS 1-9: 2
6. FEELING BAD ABOUT YOURSELF - OR THAT YOU ARE A FAILURE OR HAVE LET YOURSELF OR YOUR FAMILY DOWN: NOT AT ALL
2. FEELING DOWN, DEPRESSED OR HOPELESS: NOT AT ALL

## 2024-09-05 ASSESSMENT — EXERCISE STRESS TEST
PEAK_METS: 3.7
PEAK_HR: 109
PEAK_BP: 150/87
PEAK_HR: 100
PEAK_BP: 150/87
PEAK_RPE: 13

## 2024-09-10 ENCOUNTER — HOSPITAL ENCOUNTER (OUTPATIENT)
Facility: HOSPITAL | Age: 74
Setting detail: RECURRING SERIES
Discharge: HOME | End: 2024-09-13
Payer: MEDICARE

## 2024-09-10 VITALS — WEIGHT: 103.4 LBS | BODY MASS INDEX: 18.32 KG/M2

## 2024-09-10 PROCEDURE — 93798 PHYS/QHP OP CAR RHAB W/ECG: CPT

## 2024-09-10 ASSESSMENT — EXERCISE STRESS TEST
PEAK_HR: 112
PEAK_METS: 3.6
PEAK_RPE: 13

## 2024-09-11 PROBLEM — Z79.01 ANTICOAGULATION ADEQUATE: Status: ACTIVE | Noted: 2024-09-11

## 2024-09-12 ENCOUNTER — OFFICE VISIT (OUTPATIENT)
Age: 74
End: 2024-09-12
Payer: MEDICARE

## 2024-09-12 ENCOUNTER — PROCEDURE VISIT (OUTPATIENT)
Age: 74
End: 2024-09-12

## 2024-09-12 VITALS
HEART RATE: 77 BPM | OXYGEN SATURATION: 96 % | BODY MASS INDEX: 18.07 KG/M2 | WEIGHT: 102 LBS | SYSTOLIC BLOOD PRESSURE: 130 MMHG | DIASTOLIC BLOOD PRESSURE: 86 MMHG | HEIGHT: 63 IN

## 2024-09-12 DIAGNOSIS — I48.0 PAF (PAROXYSMAL ATRIAL FIBRILLATION) (HCC): ICD-10-CM

## 2024-09-12 DIAGNOSIS — Z95.0 PACEMAKER: Primary | ICD-10-CM

## 2024-09-12 DIAGNOSIS — I50.32 CHRONIC DIASTOLIC HEART FAILURE (HCC): ICD-10-CM

## 2024-09-12 DIAGNOSIS — I34.0 NONRHEUMATIC MITRAL VALVE REGURGITATION: ICD-10-CM

## 2024-09-12 DIAGNOSIS — Z98.890 S/P MVR (MITRAL VALVE REPAIR): ICD-10-CM

## 2024-09-12 DIAGNOSIS — Z79.01 ANTICOAGULATION ADEQUATE: ICD-10-CM

## 2024-09-12 DIAGNOSIS — Z95.0 PACEMAKER: ICD-10-CM

## 2024-09-12 LAB
ANION GAP SERPL CALC-SCNC: 2 MMOL/L (ref 2–12)
BASOPHILS # BLD: 0.1 K/UL (ref 0–0.1)
BASOPHILS NFR BLD: 1 % (ref 0–1)
BUN SERPL-MCNC: 11 MG/DL (ref 6–20)
BUN/CREAT SERPL: 17 (ref 12–20)
CALCIUM SERPL-MCNC: 9.7 MG/DL (ref 8.5–10.1)
CHLORIDE SERPL-SCNC: 104 MMOL/L (ref 97–108)
CO2 SERPL-SCNC: 33 MMOL/L (ref 21–32)
CREAT SERPL-MCNC: 0.63 MG/DL (ref 0.55–1.02)
DIFFERENTIAL METHOD BLD: ABNORMAL
EOSINOPHIL # BLD: 0.1 K/UL (ref 0–0.4)
EOSINOPHIL NFR BLD: 2 % (ref 0–7)
ERYTHROCYTE [DISTWIDTH] IN BLOOD BY AUTOMATED COUNT: 16.3 % (ref 11.5–14.5)
GLUCOSE SERPL-MCNC: 104 MG/DL (ref 65–100)
HCT VFR BLD AUTO: 35.1 % (ref 35–47)
HGB BLD-MCNC: 11 G/DL (ref 11.5–16)
IMM GRANULOCYTES # BLD AUTO: 0 K/UL (ref 0–0.04)
IMM GRANULOCYTES NFR BLD AUTO: 0 % (ref 0–0.5)
LYMPHOCYTES # BLD: 1.8 K/UL (ref 0.8–3.5)
LYMPHOCYTES NFR BLD: 31 % (ref 12–49)
MCH RBC QN AUTO: 32.1 PG (ref 26–34)
MCHC RBC AUTO-ENTMCNC: 31.3 G/DL (ref 30–36.5)
MCV RBC AUTO: 102.3 FL (ref 80–99)
MONOCYTES # BLD: 0.7 K/UL (ref 0–1)
MONOCYTES NFR BLD: 12 % (ref 5–13)
NEUTS SEG # BLD: 3.1 K/UL (ref 1.8–8)
NEUTS SEG NFR BLD: 54 % (ref 32–75)
NRBC # BLD: 0 K/UL (ref 0–0.01)
NRBC BLD-RTO: 0 PER 100 WBC
PLATELET # BLD AUTO: 155 K/UL (ref 150–400)
PMV BLD AUTO: 10.3 FL (ref 8.9–12.9)
POTASSIUM SERPL-SCNC: 4.1 MMOL/L (ref 3.5–5.1)
RBC # BLD AUTO: 3.43 M/UL (ref 3.8–5.2)
SODIUM SERPL-SCNC: 139 MMOL/L (ref 136–145)
WBC # BLD AUTO: 5.7 K/UL (ref 3.6–11)

## 2024-09-12 PROCEDURE — G8419 CALC BMI OUT NRM PARAM NOF/U: HCPCS | Performed by: INTERNAL MEDICINE

## 2024-09-12 PROCEDURE — 1036F TOBACCO NON-USER: CPT | Performed by: INTERNAL MEDICINE

## 2024-09-12 PROCEDURE — 99204 OFFICE O/P NEW MOD 45 MIN: CPT | Performed by: INTERNAL MEDICINE

## 2024-09-12 PROCEDURE — 3079F DIAST BP 80-89 MM HG: CPT | Performed by: INTERNAL MEDICINE

## 2024-09-12 PROCEDURE — 93010 ELECTROCARDIOGRAM REPORT: CPT | Performed by: INTERNAL MEDICINE

## 2024-09-12 PROCEDURE — 3075F SYST BP GE 130 - 139MM HG: CPT | Performed by: INTERNAL MEDICINE

## 2024-09-12 PROCEDURE — 3017F COLORECTAL CA SCREEN DOC REV: CPT | Performed by: INTERNAL MEDICINE

## 2024-09-12 PROCEDURE — 1123F ACP DISCUSS/DSCN MKR DOCD: CPT | Performed by: INTERNAL MEDICINE

## 2024-09-12 PROCEDURE — 93005 ELECTROCARDIOGRAM TRACING: CPT | Performed by: INTERNAL MEDICINE

## 2024-09-12 PROCEDURE — 1090F PRES/ABSN URINE INCON ASSESS: CPT | Performed by: INTERNAL MEDICINE

## 2024-09-12 PROCEDURE — G8399 PT W/DXA RESULTS DOCUMENT: HCPCS | Performed by: INTERNAL MEDICINE

## 2024-09-12 PROCEDURE — G8427 DOCREV CUR MEDS BY ELIG CLIN: HCPCS | Performed by: INTERNAL MEDICINE

## 2024-09-13 RX ORDER — FUROSEMIDE 40 MG/1
40 TABLET ORAL DAILY
Qty: 90 TABLET | Refills: 0 | OUTPATIENT
Start: 2024-09-13

## 2024-09-16 ENCOUNTER — HOSPITAL ENCOUNTER (OUTPATIENT)
Facility: HOSPITAL | Age: 74
Setting detail: RECURRING SERIES
Discharge: HOME | End: 2024-09-19
Payer: MEDICARE

## 2024-09-16 VITALS — BODY MASS INDEX: 18.53 KG/M2 | WEIGHT: 104.6 LBS

## 2024-09-16 PROCEDURE — 93798 PHYS/QHP OP CAR RHAB W/ECG: CPT

## 2024-09-16 SDOH — HEALTH STABILITY: PHYSICAL HEALTH: ON AVERAGE, HOW MANY DAYS PER WEEK DO YOU ENGAGE IN MODERATE TO STRENUOUS EXERCISE (LIKE A BRISK WALK)?: 2 DAYS

## 2024-09-16 SDOH — HEALTH STABILITY: PHYSICAL HEALTH: ON AVERAGE, HOW MANY MINUTES DO YOU ENGAGE IN EXERCISE AT THIS LEVEL?: 60 MIN

## 2024-09-16 ASSESSMENT — PATIENT HEALTH QUESTIONNAIRE - PHQ9
SUM OF ALL RESPONSES TO PHQ QUESTIONS 1-9: 0
2. FEELING DOWN, DEPRESSED OR HOPELESS: NOT AT ALL
1. LITTLE INTEREST OR PLEASURE IN DOING THINGS: NOT AT ALL
SUM OF ALL RESPONSES TO PHQ QUESTIONS 1-9: 0
SUM OF ALL RESPONSES TO PHQ9 QUESTIONS 1 & 2: 0

## 2024-09-16 ASSESSMENT — EXERCISE STRESS TEST
PEAK_HR: 104
PEAK_RPE: 13
PEAK_METS: 3.6

## 2024-09-16 ASSESSMENT — LIFESTYLE VARIABLES
HOW MANY STANDARD DRINKS CONTAINING ALCOHOL DO YOU HAVE ON A TYPICAL DAY: 1
HOW MANY STANDARD DRINKS CONTAINING ALCOHOL DO YOU HAVE ON A TYPICAL DAY: 1 OR 2
HOW OFTEN DO YOU HAVE A DRINK CONTAINING ALCOHOL: 4
HOW OFTEN DO YOU HAVE SIX OR MORE DRINKS ON ONE OCCASION: 1
HOW OFTEN DO YOU HAVE A DRINK CONTAINING ALCOHOL: 2-3 TIMES A WEEK

## 2024-09-18 ENCOUNTER — HOSPITAL ENCOUNTER (OUTPATIENT)
Facility: HOSPITAL | Age: 74
Setting detail: RECURRING SERIES
Discharge: HOME | End: 2024-09-21
Payer: MEDICARE

## 2024-09-18 VITALS — WEIGHT: 104.6 LBS | BODY MASS INDEX: 18.53 KG/M2

## 2024-09-18 PROCEDURE — 93798 PHYS/QHP OP CAR RHAB W/ECG: CPT

## 2024-09-18 ASSESSMENT — LIFESTYLE VARIABLES
ALCOHOL_AMOUNT: 1
SMOKELESS_TOBACCO: NO
ALCOHOL_TYPE: WINE
ALCOHOL_USE: SPECIAL

## 2024-09-18 ASSESSMENT — EXERCISE STRESS TEST
PEAK_RPE: 13
PEAK_HR: 112
PEAK_METS: 3.6

## 2024-09-18 ASSESSMENT — EJECTION FRACTION: EF_VALUE: 55

## 2024-09-19 ENCOUNTER — OFFICE VISIT (OUTPATIENT)
Age: 74
End: 2024-09-19
Payer: MEDICARE

## 2024-09-19 VITALS
WEIGHT: 102.4 LBS | HEIGHT: 63 IN | OXYGEN SATURATION: 96 % | DIASTOLIC BLOOD PRESSURE: 94 MMHG | SYSTOLIC BLOOD PRESSURE: 150 MMHG | RESPIRATION RATE: 16 BRPM | HEART RATE: 90 BPM | BODY MASS INDEX: 18.14 KG/M2

## 2024-09-19 DIAGNOSIS — D53.9 MACROCYTIC ANEMIA: ICD-10-CM

## 2024-09-19 DIAGNOSIS — K64.9 HEMORRHOIDS, UNSPECIFIED HEMORRHOID TYPE: ICD-10-CM

## 2024-09-19 DIAGNOSIS — Z98.890 S/P MVR (MITRAL VALVE REPAIR): ICD-10-CM

## 2024-09-19 DIAGNOSIS — R06.09 DOE (DYSPNEA ON EXERTION): ICD-10-CM

## 2024-09-19 DIAGNOSIS — I10 HTN, GOAL BELOW 130/80: Primary | ICD-10-CM

## 2024-09-19 PROCEDURE — 1036F TOBACCO NON-USER: CPT | Performed by: INTERNAL MEDICINE

## 2024-09-19 PROCEDURE — G8399 PT W/DXA RESULTS DOCUMENT: HCPCS | Performed by: INTERNAL MEDICINE

## 2024-09-19 PROCEDURE — 99214 OFFICE O/P EST MOD 30 MIN: CPT | Performed by: INTERNAL MEDICINE

## 2024-09-19 PROCEDURE — G8419 CALC BMI OUT NRM PARAM NOF/U: HCPCS | Performed by: INTERNAL MEDICINE

## 2024-09-19 PROCEDURE — 1090F PRES/ABSN URINE INCON ASSESS: CPT | Performed by: INTERNAL MEDICINE

## 2024-09-19 PROCEDURE — 3017F COLORECTAL CA SCREEN DOC REV: CPT | Performed by: INTERNAL MEDICINE

## 2024-09-19 PROCEDURE — 3080F DIAST BP >= 90 MM HG: CPT | Performed by: INTERNAL MEDICINE

## 2024-09-19 PROCEDURE — 1123F ACP DISCUSS/DSCN MKR DOCD: CPT | Performed by: INTERNAL MEDICINE

## 2024-09-19 PROCEDURE — G8427 DOCREV CUR MEDS BY ELIG CLIN: HCPCS | Performed by: INTERNAL MEDICINE

## 2024-09-19 PROCEDURE — 3077F SYST BP >= 140 MM HG: CPT | Performed by: INTERNAL MEDICINE

## 2024-09-19 RX ORDER — FUROSEMIDE 40 MG
20 TABLET ORAL DAILY
Qty: 90 TABLET | Refills: 3 | Status: SHIPPED | OUTPATIENT
Start: 2024-09-19

## 2024-09-19 RX ORDER — HYDROCORTISONE 25 MG/G
CREAM TOPICAL
Qty: 28 G | Refills: 1 | Status: SHIPPED | OUTPATIENT
Start: 2024-09-19

## 2024-09-24 ENCOUNTER — TELEPHONE (OUTPATIENT)
Age: 74
End: 2024-09-24

## 2024-09-24 NOTE — TELEPHONE ENCOUNTER
Spoke with Pt of PATRICK hoang for10/4/24 at 1pm arrive 12pm   Check in at the first floor Outpt reg. Desk.   Pt Needs a  must stay on site  Pt is to be NPO from midnight the night before.     Medications:   Ok to take   VO by /Dr. Cohen/nurse Adele BARBOUR

## 2024-09-25 ENCOUNTER — HOSPITAL ENCOUNTER (OUTPATIENT)
Facility: HOSPITAL | Age: 74
Discharge: HOME | End: 2024-09-28

## 2024-09-25 VITALS — BODY MASS INDEX: 18.42 KG/M2 | WEIGHT: 104 LBS

## 2024-09-25 PROCEDURE — 9900000112 HC DAILY CARDIAC MAINTENANCE (RETAIL)

## 2024-09-27 DIAGNOSIS — Z98.890 HISTORY OF LEFT ATRIAL APPENDAGE CLOSURE: Primary | ICD-10-CM

## 2024-09-27 RX ORDER — SODIUM CHLORIDE 0.9 % (FLUSH) 0.9 %
5-40 SYRINGE (ML) INJECTION PRN
OUTPATIENT
Start: 2024-10-04

## 2024-09-27 RX ORDER — SODIUM CHLORIDE 0.9 % (FLUSH) 0.9 %
5-40 SYRINGE (ML) INJECTION EVERY 12 HOURS SCHEDULED
OUTPATIENT
Start: 2024-10-04

## 2024-09-30 ENCOUNTER — HOSPITAL ENCOUNTER (OUTPATIENT)
Facility: HOSPITAL | Age: 74
Setting detail: RECURRING SERIES
Discharge: HOME | End: 2024-10-03

## 2024-09-30 VITALS — WEIGHT: 103.8 LBS | BODY MASS INDEX: 18.39 KG/M2

## 2024-09-30 PROCEDURE — 9900000112 HC DAILY CARDIAC MAINTENANCE (RETAIL)

## 2024-10-02 ENCOUNTER — HOSPITAL ENCOUNTER (OUTPATIENT)
Facility: HOSPITAL | Age: 74
Setting detail: RECURRING SERIES
Discharge: HOME | End: 2024-10-05

## 2024-10-02 VITALS — BODY MASS INDEX: 18.35 KG/M2 | WEIGHT: 103.6 LBS

## 2024-10-02 PROCEDURE — 9900000112 HC DAILY CARDIAC MAINTENANCE (RETAIL)

## 2024-10-04 ENCOUNTER — HOSPITAL ENCOUNTER (OUTPATIENT)
Facility: HOSPITAL | Age: 74
Discharge: HOME | End: 2024-10-04
Attending: SPECIALIST | Admitting: SPECIALIST
Payer: MEDICARE

## 2024-10-04 VITALS
WEIGHT: 103.62 LBS | RESPIRATION RATE: 23 BRPM | OXYGEN SATURATION: 98 % | HEIGHT: 63 IN | DIASTOLIC BLOOD PRESSURE: 81 MMHG | HEART RATE: 71 BPM | SYSTOLIC BLOOD PRESSURE: 144 MMHG | BODY MASS INDEX: 18.36 KG/M2

## 2024-10-04 DIAGNOSIS — I48.91 ATRIAL FIBRILLATION (HCC): ICD-10-CM

## 2024-10-04 PROBLEM — I48.0 PAROXYSMAL ATRIAL FIBRILLATION (HCC): Status: ACTIVE | Noted: 2024-10-04

## 2024-10-04 LAB
ECHO BSA: 1.45 M2
ECHO EST RA PRESSURE: 5 MMHG
ECHO LV EF PHYSICIAN: 60 %
ECHO RIGHT VENTRICULAR SYSTOLIC PRESSURE (RVSP): 34 MMHG
ECHO TV REGURGITANT MAX VELOCITY: 2.68 M/S
ECHO TV REGURGITANT PEAK GRADIENT: 29 MMHG

## 2024-10-04 PROCEDURE — 99152 MOD SED SAME PHYS/QHP 5/>YRS: CPT

## 2024-10-04 PROCEDURE — 6360000002 HC RX W HCPCS: Performed by: SPECIALIST

## 2024-10-04 PROCEDURE — 93312 ECHO TRANSESOPHAGEAL: CPT | Performed by: SPECIALIST

## 2024-10-04 PROCEDURE — 93320 DOPPLER ECHO COMPLETE: CPT | Performed by: SPECIALIST

## 2024-10-04 PROCEDURE — 6370000000 HC RX 637 (ALT 250 FOR IP): Performed by: SPECIALIST

## 2024-10-04 PROCEDURE — 99152 MOD SED SAME PHYS/QHP 5/>YRS: CPT | Performed by: SPECIALIST

## 2024-10-04 PROCEDURE — 93320 DOPPLER ECHO COMPLETE: CPT

## 2024-10-04 PROCEDURE — 93325 DOPPLER ECHO COLOR FLOW MAPG: CPT | Performed by: SPECIALIST

## 2024-10-04 PROCEDURE — 99223 1ST HOSP IP/OBS HIGH 75: CPT | Performed by: SPECIALIST

## 2024-10-04 RX ORDER — LIDOCAINE 50 MG/G
OINTMENT TOPICAL PRN
Status: COMPLETED | OUTPATIENT
Start: 2024-10-04 | End: 2024-10-04

## 2024-10-04 RX ORDER — MIDAZOLAM HYDROCHLORIDE 1 MG/ML
INJECTION INTRAMUSCULAR; INTRAVENOUS PRN
Status: COMPLETED | OUTPATIENT
Start: 2024-10-04 | End: 2024-10-04

## 2024-10-04 RX ORDER — LIDOCAINE HYDROCHLORIDE 20 MG/ML
JELLY TOPICAL PRN
Status: COMPLETED | OUTPATIENT
Start: 2024-10-04 | End: 2024-10-04

## 2024-10-04 RX ORDER — LIDOCAINE HYDROCHLORIDE 20 MG/ML
SOLUTION OROPHARYNGEAL PRN
Status: COMPLETED | OUTPATIENT
Start: 2024-10-04 | End: 2024-10-04

## 2024-10-04 RX ORDER — FENTANYL CITRATE 50 UG/ML
INJECTION, SOLUTION INTRAMUSCULAR; INTRAVENOUS PRN
Status: COMPLETED | OUTPATIENT
Start: 2024-10-04 | End: 2024-10-04

## 2024-10-04 RX ADMIN — MIDAZOLAM HYDROCHLORIDE 1 MG: 1 INJECTION, SOLUTION INTRAMUSCULAR; INTRAVENOUS at 13:09

## 2024-10-04 RX ADMIN — MIDAZOLAM HYDROCHLORIDE 1 MG: 1 INJECTION, SOLUTION INTRAMUSCULAR; INTRAVENOUS at 13:12

## 2024-10-04 RX ADMIN — LIDOCAINE HYDROCHLORIDE 5 ML: 20 JELLY TOPICAL at 13:12

## 2024-10-04 RX ADMIN — LIDOCAINE 5 ML: 50 OINTMENT TOPICAL at 12:52

## 2024-10-04 RX ADMIN — FENTANYL CITRATE 25 MCG: 50 INJECTION, SOLUTION INTRAMUSCULAR; INTRAVENOUS at 13:09

## 2024-10-04 RX ADMIN — LIDOCAINE HYDROCHLORIDE 15 ML: 20 SOLUTION ORAL at 12:46

## 2024-10-04 NOTE — H&P
Tano Dailey MD. [facility]          Patient: Alicia Torres  : 1950      Today's Date: 10/4/2024        HISTORY OF PRESENT ILLNESS:     History of Present Illness:    Here for a PATRICK to show MIKE complete closure.       PAST MEDICAL HISTORY:     Past Medical History:   Diagnosis Date    Atrial fibrillation ([facility])     Dyslipidemia     H/O maze procedure     HTN, goal below 130/80 2015    Hx of bone density study 2016    Osteopenic    Hx of mammogram 07/10/2017    Negtaive     Impaired glucose tolerance 2015    Junctional bradycardia 2024    3/34 post op from heart surgery and mvr-pacemaker placed on 3/22/24    Mitral regurgitation     mild MR    MVP (mitral valve prolapse)     Osteopenia 2015    Pacemaker 2024    PAF (paroxysmal atrial fibrillation) ([facility])     Routine Papanicolaou smear 2018    Neg pap     S/P MVR (mitral valve repair)     Thoracic aortic ectasia ([facility]) 2024 4 cm       Past Surgical History:   Procedure Laterality Date    APPENDECTOMY  1963     SECTION      COLONOSCOPY      MITRAL VALVE REPAIR  2024    ORTHOPEDIC SURGERY  13    right foot             CURRENT MEDICATIONS:    .  Current Facility-Administered Medications   Medication Dose Route Frequency Provider Last Rate Last Admin    lidocaine viscous hcl (XYLOCAINE) 2 % solution   Mouth/Throat PRN Tano Dailey MD   15 mL at 10/04/24 1246       Allergies   Allergen Reactions    Shellfish Allergy Diarrhea and Nausea And Vomiting         SOCIAL HISTORY:     Social History     Tobacco Use    Smoking status: Former     Current packs/day: 0.00     Average packs/day: 0.8 packs/day for 12.0 years (9.0 ttl pk-yrs)     Types: Cigarettes     Start date: 2000     Quit date: 2012     Years since quittin.0    Smokeless tobacco: Never   Vaping Use    Vaping status: Never Used   Substance Use Topics    Alcohol use: Yes     Alcohol/week: 1.0 standard drink of alcohol      posterior mitral leaflet  and moderate mitral regurgitation.      MV Repair 3/18/24 (Dr. Hernandez [facility]) - MV valvuloplasty + ring (32 mm Hargrove Physio II complete annuloplasty band with ring with P2 triangular resection, P1-P2 cleft closure, P2-P3 cleft closure) + Santana Maze IX, Exclusion of left atrial appendage (35 mm Atricure Flex V AtriClip)    PATRICK 3/18/24 - LVEF 50-55%.  MVP (prolapse P2) with mod MR. Mild TR.   POSTBYPASS: s/p MVr, LAAL, MAZE procedure  - LVEF 55%,   Left Atrial Appendage appears ligated with minimal residual pocket.   Mitral Valve: Mild regurgitation. Mean gradient 1 - 2 mmHg. No evidence  of SAL.       Echo 3/22/24 - LVEF 45%, s/p MVR , mild mitral stenosis.  Severe TR    Echo 3/28/24 - LVEF 40-45%, s/p MV repair and trace MR.   Severe TR.   Likely septal leaflet prolapse.     Echo 4/28/24 - I reviewed images --->  LVEF 55%, RV mildly dilated with reduced function,   Mitral Valve: Status post valvuloplasty and annular ring. Mild annular calcification of the mitral valve. Mild regurgitation.    Tricuspid Valve:  Moderate to severe tricuspid regurgitation. RVSP may be underestimated.  IVC dilated.         ASSESSMENT AND PLAN:     Assessment and Plan:    PAF  - 3/18/24 - Santana Maze IX, Exclusion of left atrial appendage (35 mm Atricure Flex V AtriClip)  - PATRICK 3/18/24 -  Left Atrial Appendage appears ligated with minimal residual pocket  - PATRICK to be done today in chronic phase to assess MIKE clip (risks of breathing complications, esophageal injury, etc reviewed)         Tano Dailey MD, [facility] [place]  [facility]  [address] [address], [address]  [address]  [address] [address]  Ph: [phone]   Ph [phone]

## 2024-10-04 NOTE — PROCEDURES
Tano Dailey MD. [facility]              Patient: Alicia Torres  : 1950      Today's Date: 10/4/2024      BRIEF PATRICK PROCEDURE NOTE    Date of Procedure: 10/4/2024   Preoperative Diagnosis: s/p MIKE clip  Postoperative Diagnosis: Complete closure of MIKE without residual shunt   Procedure: Transesophageal Echo  Cardiologist: Tano Dailey MD  Anesthesia: local + IV sedation  Estimated Blood Loss: None  Specimens Removed: None  Assistants: None  Grafts, transplants, or devices implanted: None  Findings: Complete closure of MIKE without residual shunt   See full PATRICK note.  Complications: none

## 2024-10-04 NOTE — PROGRESS NOTES
Discharge instructions reviewed with patient and daughter in law, Ashlee. Allowed adequate time to ask questions, all questions answered. Printed copy of AVS given to patient. All belongings gathered, IV and tele discontinued. Transported via wheelchair to main entrance and into care of family.

## 2024-10-04 NOTE — PROGRESS NOTES
Patient arrived to Non-Invasive Cardiology Lab for Out Patient PATRICK Procedure. Staff introduced to patient. Patient identifiers verified with Name and Date of Birth. Procedure verified with patient. Consent forms reviewed and signed by patient or authorized representative and verified. Allergies verified. Patient informed of procedure and plan of care. Questions answered with review.     Patient on cardiac monitor, non-invasive blood pressure, SPO2 monitor. Patient is A&Ox3. Patient reports no complaints. Patient belongings and valuables to remain in the room with patient.    Patient on stretcher, in low position, with side rails up and brakes locked. Patient instructed to call for assistance as needed.    Family in waiting room.

## 2024-10-07 ENCOUNTER — HOSPITAL ENCOUNTER (OUTPATIENT)
Facility: HOSPITAL | Age: 74
Setting detail: RECURRING SERIES
Discharge: HOME | End: 2024-10-10

## 2024-10-07 VITALS — WEIGHT: 103.4 LBS | BODY MASS INDEX: 18.32 KG/M2

## 2024-10-07 PROCEDURE — 9900000112 HC DAILY CARDIAC MAINTENANCE (RETAIL)

## 2024-10-08 ENCOUNTER — TELEPHONE (OUTPATIENT)
Age: 74
End: 2024-10-08

## 2024-10-08 NOTE — TELEPHONE ENCOUNTER
Attempted to reach patient by telephone. HIPAA compliant message was left for return call.         ----- Message from Dr. Deirdre Cohen MD sent at 10/7/2024 10:53 PM EDT -----  Regarding: RE: PATRICK Freedman,    We can go ahead and stop the Eliquis and start ASA 81 mg daily. Adriana, can you please call and advise patient regarding the good news from the PATRICK. Thanks.  ----- Message -----  From: Tano Dailey MD  Sent: 10/4/2024   1:46 PM EDT  To: Deirdre Cohen MD; Tano Dailey MD  Subject: PATRICK                                              MINI Perez - just did a PATRICK on her to FU on MIKE closure (3/18/24 - Exclusion of left atrial appendage (35 mm Atricure Flex V AtriClip))    Looks like MIKE closure is complete.  No shunt.  Minimal residual pocket.     I didn't change any meds yet - figured just check with you first.     Should I go ahead and stop eliquis and start aspirin as planned.     Thanks!  Tano

## 2024-10-09 NOTE — PROGRESS NOTES
History of present illness:   Healthy 41-year-old gentleman is in today with primary complaint of some mild left lower quadrant discomfort.  Also having some bilateral testicular discomfort.  He has been bothered by sciatica-lumbar radiculopathy on the left for several weeks.  He was evaluated for such already and had MRI of the lumbar spine.  He states this discomfort in his abdomen started around the same time.  Those radiculopathy symptoms manifested by some lower back discomfort does seem to radiate to his buttock and lower leg at times.  He is not having any dysuria frequency change in the color character of his urine.  No fever no chills no general body aches.  No history of renal stones.    Current medications:   None.      Review of systems:   Constitutional: No fever no chills no generalized body aches.    HEENT: No URI symptoms.    Respiratory: No cough shortness of breath.    Cardiovascular:  No chest pain palpitations.    GI:  See HPI.  There is no nausea no vomiting.  No changes in bowel habits.  The discomfort is not associated with bowel movement.    : Denies any dysuria frequency change in the color or character of his urine.  No flow issues.    Skin: No rashes.      Physical examination:   General: Alert appropriately groomed gentleman no acute distress.    Vital signs:  All noted and reviewed is normal.    Cardiovascular: Regular rate rhythm.    Abdomen: Nondistended.  Soft benign no masses no tenderness no organomegaly.   exam scrotum testicles penis are normal.  No inguinal hernia.    Skin: No rashes.    Data:   Dipstick urinalysis in the office normal.    Reviewed recent MRI of the lumbar spine.  Reviewed lab data from last month all normal.  Urinalysis was not checked.      Impression:   Recent mild fairly localized left lower quadrant discomfort could be related to his lumbar radiculopathy symptoms.  This could be muscular in nature.  Could be related to urolithiasis in light of his  PHYSICAL THERAPY - MEDICARE DAILY TREATMENT NOTE (updated 3/23)      Date: 10/31/2023          Patient Name:  Zac Salas :  1950   Medical   Diagnosis:  Neck pain [M54.2] Treatment Diagnosis:  M54.2  NECK PAIN    Referral Source:  Venus Kong MD Insurance:   Payor: Bautista Gracia / Plan: MEDICARE PART A AND B / Product Type: *No Product type* /                     Patient  verified yes     Visit #   Current  / Total 2 24   Time   In / Out 11:15 AM 11:55 AM   Total Treatment Time 40   Total Timed Codes 40   1:1 Treatment Time 40      [facility] Totals Reminder:  bill using total billable   min of TIMED therapeutic procedures and modalities. 8-22 min = 1 unit; 23-37 min = 2 units; 38-52 min = 3 units; 53-67 min = 4 units; 68-82 min = 5 units            SUBJECTIVE    Pain Level (0-10 scale): 0    Any medication changes, allergies to medications, adverse drug reactions, diagnosis change, or new procedure performed?: [x] No    [] Yes (see summary sheet for update)  Medications: Verified on Patient Summary List    Subjective functional status/changes:     Patient reports no pain and feels comfortable doing her HEP at home. OBJECTIVE      Therapeutic Procedures: Tx Min Billable or 1:1 Min (if diff from Tx Min) Procedure, Rationale, Specifics   40  [id number] Therapeutic Exercise (timed):  increase ROM, strength, coordination, balance, and proprioception to improve patient's ability to progress to PLOF and address remaining functional goals.  (see flow sheet as applicable)     Details if applicable:  see flow sheet   40     Total Total       [x]  Patient Education billed concurrently with other procedures   [x] Review HEP    [] Progressed/Changed HEP, detail:    [] Other detail:         Other Objective/Functional Measures  FOTO: 95    Pain Level at end of session (0-10 scale): 0      Assessment   Patient will continue to benefit from skilled PT / OT services to modify and progress therapeutic testicular discomfort though there is no microhematuria noted that needs to be ruled out.      Plan:   CT renal stone study.    Discussed results for further disposition

## 2024-10-11 ENCOUNTER — HOSPITAL ENCOUNTER (OUTPATIENT)
Facility: HOSPITAL | Age: 74
Setting detail: RECURRING SERIES
Discharge: HOME | End: 2024-10-14

## 2024-10-11 VITALS — WEIGHT: 104 LBS | BODY MASS INDEX: 18.42 KG/M2

## 2024-10-11 PROCEDURE — 9900000112 HC DAILY CARDIAC MAINTENANCE (RETAIL)

## 2024-10-15 ENCOUNTER — HOSPITAL ENCOUNTER (OUTPATIENT)
Facility: HOSPITAL | Age: 74
Setting detail: RECURRING SERIES
Discharge: HOME | End: 2024-10-18

## 2024-10-15 VITALS — BODY MASS INDEX: 18.71 KG/M2 | WEIGHT: 105.6 LBS

## 2024-10-15 PROCEDURE — 9900000112 HC DAILY CARDIAC MAINTENANCE (RETAIL)

## 2024-10-18 ENCOUNTER — APPOINTMENT (OUTPATIENT)
Facility: HOSPITAL | Age: 74
End: 2024-10-18

## 2024-10-30 ENCOUNTER — APPOINTMENT (OUTPATIENT)
Facility: HOSPITAL | Age: 74
End: 2024-10-30

## 2024-10-31 RX ORDER — POTASSIUM CHLORIDE 750 MG/1
20 TABLET, EXTENDED RELEASE ORAL DAILY
Qty: 60 TABLET | Refills: 5 | Status: SHIPPED | OUTPATIENT
Start: 2024-10-31

## 2024-12-02 DIAGNOSIS — I10 HTN, GOAL BELOW 130/80: Primary | ICD-10-CM

## 2024-12-02 RX ORDER — METOPROLOL TARTRATE 25 MG/1
25 TABLET, FILM COATED ORAL 2 TIMES DAILY
Qty: 180 TABLET | Refills: 1 | Status: SHIPPED | OUTPATIENT
Start: 2024-12-02

## 2025-02-03 DIAGNOSIS — I10 HTN, GOAL BELOW 130/80: ICD-10-CM

## 2025-02-03 DIAGNOSIS — R06.09 DOE (DYSPNEA ON EXERTION): ICD-10-CM

## 2025-02-03 RX ORDER — FUROSEMIDE 40 MG/1
20 TABLET ORAL DAILY
Qty: 90 TABLET | Refills: 3 | OUTPATIENT
Start: 2025-02-03

## 2025-02-03 RX ORDER — FUROSEMIDE 20 MG/1
20 TABLET ORAL DAILY
Qty: 90 TABLET | Refills: 1 | Status: SHIPPED | OUTPATIENT
Start: 2025-02-03

## 2025-02-03 NOTE — TELEPHONE ENCOUNTER
Patient requested a new RX for the 20 mg lasix as she is having trouble cutting the 40 mg pills in half. Updated Rx sent.

## 2025-02-17 ENCOUNTER — HOSPITAL ENCOUNTER (OUTPATIENT)
Facility: HOSPITAL | Age: 75
Discharge: HOME | End: 2025-02-20
Payer: MEDICARE

## 2025-02-17 VITALS — HEIGHT: 63 IN | BODY MASS INDEX: 18.61 KG/M2 | WEIGHT: 105 LBS

## 2025-02-17 DIAGNOSIS — R92.8 FOLLOW-UP EXAMINATION OF ABNORMAL MAMMOGRAM: ICD-10-CM

## 2025-02-17 DIAGNOSIS — R92.8 ABNORMAL MAMMOGRAM OF RIGHT BREAST: ICD-10-CM

## 2025-02-17 PROCEDURE — G0279 TOMOSYNTHESIS, MAMMO: HCPCS

## 2025-02-19 ENCOUNTER — OFFICE VISIT (OUTPATIENT)
Facility: CLINIC | Age: 75
End: 2025-02-19
Payer: MEDICARE

## 2025-02-19 VITALS
WEIGHT: 106 LBS | TEMPERATURE: 97.6 F | RESPIRATION RATE: 16 BRPM | HEIGHT: 63 IN | SYSTOLIC BLOOD PRESSURE: 140 MMHG | BODY MASS INDEX: 18.78 KG/M2 | DIASTOLIC BLOOD PRESSURE: 82 MMHG | OXYGEN SATURATION: 96 % | HEART RATE: 92 BPM

## 2025-02-19 DIAGNOSIS — H25.019 CORTICAL AGE-RELATED CATARACT, UNSPECIFIED LATERALITY: ICD-10-CM

## 2025-02-19 DIAGNOSIS — Z01.818 PREOP EXAMINATION: ICD-10-CM

## 2025-02-19 DIAGNOSIS — I10 HTN, GOAL BELOW 130/80: Primary | ICD-10-CM

## 2025-02-19 DIAGNOSIS — N89.8 VAGINAL CYST: ICD-10-CM

## 2025-02-19 DIAGNOSIS — Z98.890 S/P MVR (MITRAL VALVE REPAIR): ICD-10-CM

## 2025-02-19 PROBLEM — I48.0 PAROXYSMAL ATRIAL FIBRILLATION (HCC): Status: RESOLVED | Noted: 2024-10-04 | Resolved: 2025-02-19

## 2025-02-19 LAB
ALBUMIN SERPL-MCNC: 4 G/DL (ref 3.5–5)
ALBUMIN/GLOB SERPL: 1.3 (ref 1.1–2.2)
ALP SERPL-CCNC: 111 U/L (ref 45–117)
ALT SERPL-CCNC: 110 U/L (ref 12–78)
ANION GAP SERPL CALC-SCNC: 8 MMOL/L (ref 2–12)
AST SERPL-CCNC: 124 U/L (ref 15–37)
BILIRUB SERPL-MCNC: 0.7 MG/DL (ref 0.2–1)
BUN SERPL-MCNC: 13 MG/DL (ref 6–20)
BUN/CREAT SERPL: 19 (ref 12–20)
CALCIUM SERPL-MCNC: 9.6 MG/DL (ref 8.5–10.1)
CHLORIDE SERPL-SCNC: 102 MMOL/L (ref 97–108)
CO2 SERPL-SCNC: 30 MMOL/L (ref 21–32)
CREAT SERPL-MCNC: 0.69 MG/DL (ref 0.55–1.02)
ERYTHROCYTE [DISTWIDTH] IN BLOOD BY AUTOMATED COUNT: 13.1 % (ref 11.5–14.5)
GLOBULIN SER CALC-MCNC: 3.2 G/DL (ref 2–4)
GLUCOSE SERPL-MCNC: 105 MG/DL (ref 65–100)
HCT VFR BLD AUTO: 39.5 % (ref 35–47)
HGB BLD-MCNC: 12.8 G/DL (ref 11.5–16)
MCH RBC QN AUTO: 32.7 PG (ref 26–34)
MCHC RBC AUTO-ENTMCNC: 32.4 G/DL (ref 30–36.5)
MCV RBC AUTO: 101 FL (ref 80–99)
NRBC # BLD: 0 K/UL (ref 0–0.01)
NRBC BLD-RTO: 0 PER 100 WBC
PLATELET # BLD AUTO: 167 K/UL (ref 150–400)
PMV BLD AUTO: 10.4 FL (ref 8.9–12.9)
POTASSIUM SERPL-SCNC: 4.1 MMOL/L (ref 3.5–5.1)
PROT SERPL-MCNC: 7.2 G/DL (ref 6.4–8.2)
RBC # BLD AUTO: 3.91 M/UL (ref 3.8–5.2)
SODIUM SERPL-SCNC: 140 MMOL/L (ref 136–145)
WBC # BLD AUTO: 7.6 K/UL (ref 3.6–11)

## 2025-02-19 PROCEDURE — 1036F TOBACCO NON-USER: CPT | Performed by: INTERNAL MEDICINE

## 2025-02-19 PROCEDURE — 99214 OFFICE O/P EST MOD 30 MIN: CPT | Performed by: INTERNAL MEDICINE

## 2025-02-19 PROCEDURE — 1159F MED LIST DOCD IN RCRD: CPT | Performed by: INTERNAL MEDICINE

## 2025-02-19 PROCEDURE — G8420 CALC BMI NORM PARAMETERS: HCPCS | Performed by: INTERNAL MEDICINE

## 2025-02-19 PROCEDURE — 1126F AMNT PAIN NOTED NONE PRSNT: CPT | Performed by: INTERNAL MEDICINE

## 2025-02-19 PROCEDURE — G8399 PT W/DXA RESULTS DOCUMENT: HCPCS | Performed by: INTERNAL MEDICINE

## 2025-02-19 PROCEDURE — G8427 DOCREV CUR MEDS BY ELIG CLIN: HCPCS | Performed by: INTERNAL MEDICINE

## 2025-02-19 PROCEDURE — 3017F COLORECTAL CA SCREEN DOC REV: CPT | Performed by: INTERNAL MEDICINE

## 2025-02-19 PROCEDURE — 1160F RVW MEDS BY RX/DR IN RCRD: CPT | Performed by: INTERNAL MEDICINE

## 2025-02-19 PROCEDURE — 1090F PRES/ABSN URINE INCON ASSESS: CPT | Performed by: INTERNAL MEDICINE

## 2025-02-19 PROCEDURE — 3077F SYST BP >= 140 MM HG: CPT | Performed by: INTERNAL MEDICINE

## 2025-02-19 PROCEDURE — 3079F DIAST BP 80-89 MM HG: CPT | Performed by: INTERNAL MEDICINE

## 2025-02-19 PROCEDURE — 1123F ACP DISCUSS/DSCN MKR DOCD: CPT | Performed by: INTERNAL MEDICINE

## 2025-02-19 RX ORDER — POTASSIUM CHLORIDE 750 MG/1
10 TABLET, EXTENDED RELEASE ORAL DAILY
Qty: 30 TABLET | Refills: 5
Start: 2025-02-19

## 2025-02-19 RX ORDER — AMOXICILLIN 500 MG/1
CAPSULE ORAL
Qty: 4 CAPSULE | Refills: 1 | Status: SHIPPED | OUTPATIENT
Start: 2025-02-19

## 2025-02-19 RX ORDER — ASPIRIN 81 MG/1
1 TABLET ORAL
COMMUNITY

## 2025-02-19 RX ORDER — METOPROLOL TARTRATE 25 MG/1
TABLET, FILM COATED ORAL
Qty: 180 TABLET | Refills: 1 | Status: SHIPPED | OUTPATIENT
Start: 2025-02-19

## 2025-02-19 SDOH — ECONOMIC STABILITY: FOOD INSECURITY: WITHIN THE PAST 12 MONTHS, YOU WORRIED THAT YOUR FOOD WOULD RUN OUT BEFORE YOU GOT MONEY TO BUY MORE.: NEVER TRUE

## 2025-02-19 SDOH — ECONOMIC STABILITY: FOOD INSECURITY: WITHIN THE PAST 12 MONTHS, THE FOOD YOU BOUGHT JUST DIDN'T LAST AND YOU DIDN'T HAVE MONEY TO GET MORE.: NEVER TRUE

## 2025-02-19 ASSESSMENT — PATIENT HEALTH QUESTIONNAIRE - PHQ9
SUM OF ALL RESPONSES TO PHQ QUESTIONS 1-9: 0
SUM OF ALL RESPONSES TO PHQ QUESTIONS 1-9: 0
1. LITTLE INTEREST OR PLEASURE IN DOING THINGS: NOT AT ALL
SUM OF ALL RESPONSES TO PHQ QUESTIONS 1-9: 0
2. FEELING DOWN, DEPRESSED OR HOPELESS: NOT AT ALL
SUM OF ALL RESPONSES TO PHQ QUESTIONS 1-9: 0
SUM OF ALL RESPONSES TO PHQ9 QUESTIONS 1 & 2: 0

## 2025-02-19 NOTE — PROGRESS NOTES
Alicia Torres (:  1950) is a 75 y.o. female,Established patient, here for evaluation of the following chief complaint(s):  Pre-op Exam (Pre-op; cataract surgery 3/18)         Assessment & Plan  HTN, goal below 130/80  Home blood pressure generally 140-150 systolic 70s to 80s diastolic.  Feels well.  Will increase metoprolol to 25 mg 1-1/2 tab in morning and 1 tab in the evening.  Continue Lasix 20 mg daily with potassium 10 mill equivalents daily.  Check chemistries    Orders:  •  potassium chloride (KLOR-CON M) 10 MEQ extended release tablet; Take 1 tablet by mouth daily  •  Comprehensive Metabolic Panel; Future  •  Comprehensive Metabolic Panel  •  metoprolol tartrate (LOPRESSOR) 25 MG tablet; 1.5 tablets in am and 1 tablet in pm    S/P MVR (mitral valve repair)  Clinically very stable no shortness of breath or palpitations.  Requests amoxicillin to premedicate before dental work.  Discussed this is no longer the guideline but her dentist refuses to do work without the amoxicillin and she has taken it before         Preop examination  Stable for planned cataract repair with Dr. Harding  preop forms filled out         Vaginal cyst  Consistent with Bartholin cyst.  No sign of infection.  Notify me if it becomes painful or is draining    Orders:  •  CBC; Future  •  CBC    Cortical age-related cataract, unspecified laterality              No follow-ups on file.       Subjective   HPI  Seen for preop evaluation.  Dr. José Miguel Harding will be doing cataract extraction with lens implant using regional anesthesia scheduled for 2025.  She is currently very stable from a cardiac perspective.  No recent palpitations no dyspnea on exertion.  No chest pain.  No significant edema.  Monitors blood pressure at home systolics are generally in the 140 or 150 range.  Diastolics range from 70-80.  Currently on metoprolol 25 twice daily and Lasix 20 mg daily.  Denies significant leg cramps.  Denies

## 2025-02-19 NOTE — ASSESSMENT & PLAN NOTE
Clinically very stable no shortness of breath or palpitations.  Requests amoxicillin to premedicate before dental work.  Discussed this is no longer the guideline but her dentist refuses to do work without the amoxicillin and she has taken it before

## 2025-02-20 ENCOUNTER — TELEPHONE (OUTPATIENT)
Facility: CLINIC | Age: 75
End: 2025-02-20

## 2025-02-20 DIAGNOSIS — R74.01 TRANSAMINITIS: Primary | ICD-10-CM

## 2025-02-20 NOTE — TELEPHONE ENCOUNTER
We discussed her increase in ast and alt  Will stop the lipitor and repeat levels in 1 month  Continue current dose of kcl 10 meq

## 2025-03-19 ENCOUNTER — RESULTS FOLLOW-UP (OUTPATIENT)
Facility: CLINIC | Age: 75
End: 2025-03-19

## 2025-03-19 DIAGNOSIS — R74.01 TRANSAMINITIS: ICD-10-CM

## 2025-03-19 LAB
ALBUMIN SERPL-MCNC: 4 G/DL (ref 3.5–5)
ALBUMIN/GLOB SERPL: 1.4 (ref 1.1–2.2)
ALP SERPL-CCNC: 86 U/L (ref 45–117)
ALT SERPL-CCNC: 41 U/L (ref 12–78)
ANION GAP SERPL CALC-SCNC: 5 MMOL/L (ref 2–12)
AST SERPL-CCNC: 45 U/L (ref 15–37)
BILIRUB SERPL-MCNC: 0.6 MG/DL (ref 0.2–1)
BUN SERPL-MCNC: 10 MG/DL (ref 6–20)
BUN/CREAT SERPL: 16 (ref 12–20)
CALCIUM SERPL-MCNC: 9.6 MG/DL (ref 8.5–10.1)
CHLORIDE SERPL-SCNC: 103 MMOL/L (ref 97–108)
CO2 SERPL-SCNC: 32 MMOL/L (ref 21–32)
CREAT SERPL-MCNC: 0.64 MG/DL (ref 0.55–1.02)
GLOBULIN SER CALC-MCNC: 2.8 G/DL (ref 2–4)
GLUCOSE SERPL-MCNC: 89 MG/DL (ref 65–100)
POTASSIUM SERPL-SCNC: 3.9 MMOL/L (ref 3.5–5.1)
PROT SERPL-MCNC: 6.8 G/DL (ref 6.4–8.2)
SODIUM SERPL-SCNC: 140 MMOL/L (ref 136–145)

## 2025-03-20 ENCOUNTER — OFFICE VISIT (OUTPATIENT)
Age: 75
End: 2025-03-20
Payer: MEDICARE

## 2025-03-20 ENCOUNTER — ANCILLARY PROCEDURE (OUTPATIENT)
Age: 75
End: 2025-03-20
Payer: MEDICARE

## 2025-03-20 VITALS
DIASTOLIC BLOOD PRESSURE: 88 MMHG | SYSTOLIC BLOOD PRESSURE: 158 MMHG | OXYGEN SATURATION: 97 % | HEIGHT: 63 IN | HEART RATE: 72 BPM | WEIGHT: 107 LBS | BODY MASS INDEX: 18.96 KG/M2

## 2025-03-20 VITALS
SYSTOLIC BLOOD PRESSURE: 148 MMHG | WEIGHT: 107.2 LBS | BODY MASS INDEX: 19 KG/M2 | DIASTOLIC BLOOD PRESSURE: 80 MMHG | HEART RATE: 73 BPM | HEIGHT: 63 IN

## 2025-03-20 DIAGNOSIS — I48.0 PAF (PAROXYSMAL ATRIAL FIBRILLATION) (HCC): ICD-10-CM

## 2025-03-20 DIAGNOSIS — I50.23 ACUTE ON CHRONIC SYSTOLIC HEART FAILURE (HCC): Primary | ICD-10-CM

## 2025-03-20 DIAGNOSIS — I50.32 CHRONIC DIASTOLIC HEART FAILURE (HCC): ICD-10-CM

## 2025-03-20 DIAGNOSIS — I34.0 NONRHEUMATIC MITRAL VALVE REGURGITATION: ICD-10-CM

## 2025-03-20 DIAGNOSIS — Z98.890 H/O MAZE PROCEDURE: ICD-10-CM

## 2025-03-20 DIAGNOSIS — Z98.890 S/P MVR (MITRAL VALVE REPAIR): ICD-10-CM

## 2025-03-20 DIAGNOSIS — I10 HTN, GOAL BELOW 130/80: ICD-10-CM

## 2025-03-20 PROCEDURE — G8427 DOCREV CUR MEDS BY ELIG CLIN: HCPCS | Performed by: SPECIALIST

## 2025-03-20 PROCEDURE — G8399 PT W/DXA RESULTS DOCUMENT: HCPCS | Performed by: SPECIALIST

## 2025-03-20 PROCEDURE — G8420 CALC BMI NORM PARAMETERS: HCPCS | Performed by: SPECIALIST

## 2025-03-20 PROCEDURE — 3077F SYST BP >= 140 MM HG: CPT | Performed by: SPECIALIST

## 2025-03-20 PROCEDURE — 99214 OFFICE O/P EST MOD 30 MIN: CPT | Performed by: SPECIALIST

## 2025-03-20 PROCEDURE — 3017F COLORECTAL CA SCREEN DOC REV: CPT | Performed by: SPECIALIST

## 2025-03-20 PROCEDURE — 1160F RVW MEDS BY RX/DR IN RCRD: CPT | Performed by: SPECIALIST

## 2025-03-20 PROCEDURE — 1090F PRES/ABSN URINE INCON ASSESS: CPT | Performed by: SPECIALIST

## 2025-03-20 PROCEDURE — 1036F TOBACCO NON-USER: CPT | Performed by: SPECIALIST

## 2025-03-20 PROCEDURE — 1123F ACP DISCUSS/DSCN MKR DOCD: CPT | Performed by: SPECIALIST

## 2025-03-20 PROCEDURE — 1159F MED LIST DOCD IN RCRD: CPT | Performed by: SPECIALIST

## 2025-03-20 PROCEDURE — 1126F AMNT PAIN NOTED NONE PRSNT: CPT | Performed by: SPECIALIST

## 2025-03-20 PROCEDURE — 93306 TTE W/DOPPLER COMPLETE: CPT | Performed by: SPECIALIST

## 2025-03-20 PROCEDURE — 3079F DIAST BP 80-89 MM HG: CPT | Performed by: SPECIALIST

## 2025-03-20 RX ORDER — METOPROLOL TARTRATE 37.5 MG/1
37.5 TABLET ORAL 2 TIMES DAILY
Qty: 180 TABLET | Refills: 3 | Status: SHIPPED | OUTPATIENT
Start: 2025-03-20

## 2025-03-20 RX ORDER — PREDNISOLONE ACETATE 10 MG/ML
1 SUSPENSION/ DROPS OPHTHALMIC 4 TIMES DAILY
COMMUNITY
Start: 2024-12-12

## 2025-03-20 RX ORDER — ATORVASTATIN CALCIUM 10 MG/1
10 TABLET, FILM COATED ORAL DAILY
Qty: 90 TABLET | Refills: 3 | Status: SHIPPED | OUTPATIENT
Start: 2025-03-20

## 2025-03-20 RX ORDER — MOXIFLOXACIN 5 MG/ML
1 SOLUTION/ DROPS OPHTHALMIC 4 TIMES DAILY
COMMUNITY
Start: 2024-12-12

## 2025-03-20 RX ORDER — ATORVASTATIN CALCIUM 20 MG/1
20 TABLET, FILM COATED ORAL DAILY
Qty: 90 TABLET | Refills: 3 | Status: SHIPPED | OUTPATIENT
Start: 2025-03-20 | End: 2025-03-20 | Stop reason: SDUPTHER

## 2025-03-20 NOTE — PROGRESS NOTES
Chief Complaint   Patient presents with    Atrial Fibrillation    MVR    Congestive Heart Failure     Vitals:    03/20/25 1053 03/20/25 1100   BP: (!) 158/90 (!) 158/88   BP Site: Left Upper Arm Left Upper Arm   Patient Position: Sitting Sitting   Pulse: 72    SpO2: 97%    Weight: 48.5 kg (107 lb)    Height: 1.6 m (5' 3\")          Chest pain: DENIED     Recent hospital stays: DENIED     Refills: DENIED     Patient brought BP readings from home, fluctuating per list.  
Plan:    PAF  - 3/18/24 - Santana Maze IX, Exclusion of left atrial appendage (35 mm Atricure Flex V AtriClip)  - PATRICK 3/18/24 -  Left Atrial Appendage appears ligated with minimal residual pocket  - PATRICK 10/4/24 - Complete closure of MIKE without residual shunt       Tano Dailey MD, FACC    [facility]  [facility] [place][note 2]  [address], [address]  [address] [address]  [place], [place]  [address]  [place] [address]  Ph: [phone]   Ph [phone]  
annuloplasty band with ring with P2 triangular resection, P1-P2 cleft closure, P2-P3 cleft closure) + Santana Maze IX, Exclusion of left atrial appendage (35 mm Atricure Flex V AtriClip)    PATRICK 3/18/24 - LVEF 50-55%.  MVP (prolapse P2) with mod MR. Mild TR.   POSTBYPASS: s/p MVr, LAAL, MAZE procedure  - LVEF 55%,   Left Atrial Appendage appears ligated with minimal residual pocket.   Mitral Valve: Mild regurgitation. Mean gradient 1 - 2 mmHg. No evidence  of SAL.       Echo 3/22/24 - LVEF 45%, s/p MVR , mild mitral stenosis.  Severe TR    Echo 3/28/24 - LVEF 40-45%, s/p MV repair and trace MR.   Severe TR.   Likely septal leaflet prolapse.     Echo 4/28/24 - I reviewed images --->  LVEF 55%, RV mildly dilated with reduced function,   Mitral Valve: Status post valvuloplasty and annular ring. Mild annular calcification of the mitral valve. Mild regurgitation.    Tricuspid Valve:  Moderate to severe tricuspid regurgitation. RVSP may be underestimated.  IVC dilated.     PATRICK 10/4/24 - complete MIKE closure confirmed     Echo 3/20/25 - PRELIM - stable         ASSESSMENT AND PLAN:     Assessment and Plan:    1) MVP s/p MV Repair  - PATRICK 1/8/24 - LVEF 55-60%,  MVP with mod-severe MR.   - MV Repair 3/18/24 (Dr. Hernandez [facility]) - MV valvuloplasty + ring (32 mm Hargrove Physio II complete annuloplasty band with ring with P2 triangular resection, P1-P2 cleft closure, P2-P3 cleft closure) + Santana Maze IX, Exclusion of left atrial appendage (35 mm Atricure Flex V AtriClip)  - Echo 4/28/24 - I reviewed images --->  LVEF 55%, s/p MV repair with mild MR.  Tricuspid Valve:  Moderate to severe tricuspid regurgitation. RVSP may be underestimated.  IVC dilated.   - Of note, she has TR s/p MV repair -- will follow - serial echoes       2) HFmrEF  - She seems volume compensated - takes lasix daily   - on Echo 3/20/25 - LVEF is mildly depressed - her BP is also high at home ('s) --> will try adding Entesto 1/2 tablet BID to start       3) PAF  -

## 2025-03-20 NOTE — PATIENT INSTRUCTIONS
drinks like soda.  The Mediterranean diet may also include red wine with your meal--1 glass each day for women and up to 2 glasses a day for men.  Tips for eating at home  Use herbs, spices, garlic, lemon zest, and citrus juice instead of salt to add flavor to foods.  Add avocado slices to your sandwich instead of pina.  Have fish for lunch or dinner instead of red meat. Brush the fish with olive oil, and broil or grill it.  Sprinkle your salad with seeds or nuts instead of cheese.  Cook with olive or canola oil instead of butter or oils that are high in saturated fat.  Switch from 2% milk or whole milk to 1% or fat-free milk.  Dip raw vegetables in a vinaigrette dressing or hummus instead of dips made from mayonnaise or sour cream.  Have a piece of fruit for dessert instead of a piece of cake. Try baked apples, or have some dried fruit.  Tips for eating out  Try broiled, grilled, baked, or poached fish instead of having it fried or breaded.  Ask your  to have your meals prepared with olive oil instead of butter.  Order dishes made with marinara sauce or sauces made from olive oil. Avoid sauces made from cream or mayonnaise.  Choose whole-grain breads, whole wheat pasta and pizza crust, brown rice, beans, and lentils.  Cut back on butter or margarine on bread. Instead, you can dip your bread in a small amount of olive oil.  Ask for a side salad or grilled vegetables instead of french fries or chips.  Where can you learn more?  Go to https://www.[employer].net/patientEd and enter O407 to learn more about \"Learning About the Mediterranean Diet.\"  Current as of: October 7, 2024  Content Version: 14.4  © 6306-1370 [employer].   Care instructions adapted under license by [employer]. If you have questions about a medical condition or this instruction, always ask your healthcare professional. [employer] disclaims any warranty or liability for your use of this information.

## 2025-03-22 LAB
ECHO AO ASC DIAM: 3.8 CM
ECHO AO ASCENDING AORTA INDEX: 2.57 CM/M2
ECHO AO ROOT DIAM: 3.6 CM
ECHO AO ROOT INDEX: 2.43 CM/M2
ECHO AR MAX VEL PISA: 4.5 M/S
ECHO AV AREA PEAK VELOCITY: 3.2 CM2
ECHO AV AREA VTI: 3.5 CM2
ECHO AV AREA/BSA PEAK VELOCITY: 2.2 CM2/M2
ECHO AV AREA/BSA VTI: 2.4 CM2/M2
ECHO AV MEAN GRADIENT: 2 MMHG
ECHO AV MEAN VELOCITY: 0.7 M/S
ECHO AV PEAK GRADIENT: 4 MMHG
ECHO AV PEAK VELOCITY: 1 M/S
ECHO AV REGURGITANT PHT: 377.5 MS
ECHO AV VELOCITY RATIO: 0.9
ECHO AV VTI: 20.2 CM
ECHO BSA: 1.47 M2
ECHO EST RA PRESSURE: 8 MMHG
ECHO LA DIAMETER INDEX: 3.31 CM/M2
ECHO LA DIAMETER: 4.9 CM
ECHO LA TO AORTIC ROOT RATIO: 1.36
ECHO LA VOL A-L A2C: 75 ML (ref 22–52)
ECHO LA VOL A-L A4C: 77 ML (ref 22–52)
ECHO LA VOL MOD A2C: 75 ML (ref 22–52)
ECHO LA VOL MOD A4C: 72 ML (ref 22–52)
ECHO LA VOLUME AREA LENGTH: 76 ML
ECHO LA VOLUME INDEX A-L A2C: 51 ML/M2 (ref 16–34)
ECHO LA VOLUME INDEX A-L A4C: 52 ML/M2 (ref 16–34)
ECHO LA VOLUME INDEX AREA LENGTH: 51 ML/M2 (ref 16–34)
ECHO LA VOLUME INDEX MOD A2C: 51 ML/M2 (ref 16–34)
ECHO LA VOLUME INDEX MOD A4C: 49 ML/M2 (ref 16–34)
ECHO LV EDV A2C: 107 ML
ECHO LV EDV A4C: 88 ML
ECHO LV EDV BP: 101 ML (ref 56–104)
ECHO LV EDV INDEX A4C: 59 ML/M2
ECHO LV EDV INDEX BP: 68 ML/M2
ECHO LV EDV NDEX A2C: 72 ML/M2
ECHO LV EF PHYSICIAN: 55 %
ECHO LV EJECTION FRACTION A2C: 56 %
ECHO LV EJECTION FRACTION A4C: 51 %
ECHO LV ESV A2C: 47 ML
ECHO LV ESV A4C: 43 ML
ECHO LV ESV BP: 48 ML (ref 19–49)
ECHO LV ESV INDEX A2C: 32 ML/M2
ECHO LV ESV INDEX A4C: 29 ML/M2
ECHO LV ESV INDEX BP: 32 ML/M2
ECHO LV FRACTIONAL SHORTENING: 28 % (ref 28–44)
ECHO LV INTERNAL DIMENSION DIASTOLE INDEX: 3.18 CM/M2
ECHO LV INTERNAL DIMENSION DIASTOLIC: 4.7 CM (ref 3.9–5.3)
ECHO LV INTERNAL DIMENSION SYSTOLIC INDEX: 2.3 CM/M2
ECHO LV INTERNAL DIMENSION SYSTOLIC: 3.4 CM
ECHO LV IVSD: 0.7 CM (ref 0.6–0.9)
ECHO LV MASS 2D: 122.3 G (ref 67–162)
ECHO LV MASS INDEX 2D: 82.6 G/M2 (ref 43–95)
ECHO LV POSTERIOR WALL DIASTOLIC: 0.9 CM (ref 0.6–0.9)
ECHO LV RELATIVE WALL THICKNESS RATIO: 0.38
ECHO LVOT AREA: 3.5 CM2
ECHO LVOT AV VTI INDEX: 0.98
ECHO LVOT DIAM: 2.1 CM
ECHO LVOT MEAN GRADIENT: 2 MMHG
ECHO LVOT PEAK GRADIENT: 4 MMHG
ECHO LVOT PEAK VELOCITY: 0.9 M/S
ECHO LVOT STROKE VOLUME INDEX: 46.3 ML/M2
ECHO LVOT SV: 68.5 ML
ECHO LVOT VTI: 19.8 CM
ECHO MV AREA VTI: 2.7 CM2
ECHO MV E VELOCITY: 1.29 M/S
ECHO MV LVOT VTI INDEX: 1.27
ECHO MV MAX VELOCITY: 1.4 M/S
ECHO MV MEAN GRADIENT: 2 MMHG
ECHO MV MEAN VELOCITY: 0.6 M/S
ECHO MV PEAK GRADIENT: 7 MMHG
ECHO MV VTI: 25.2 CM
ECHO RA AREA 4C: 20.1 CM2
ECHO RA END SYSTOLIC VOLUME APICAL 4 CHAMBER INDEX BSA: 45 ML/M2
ECHO RA VOLUME: 66 ML
ECHO RIGHT VENTRICULAR SYSTOLIC PRESSURE (RVSP): 42 MMHG
ECHO RV FREE WALL PEAK S': 7.9 CM/S
ECHO RV INTERNAL DIMENSION: 4.4 CM
ECHO RV TAPSE: 1.5 CM (ref 1.7–?)
ECHO TV REGURGITANT MAX VELOCITY: 2.93 M/S
ECHO TV REGURGITANT PEAK GRADIENT: 34 MMHG

## 2025-03-22 PROCEDURE — 93306 TTE W/DOPPLER COMPLETE: CPT | Performed by: SPECIALIST

## 2025-04-03 NOTE — TELEPHONE ENCOUNTER
Spoke to [facility] about letter from insurance company due to Metoprolol 37.5 mg BID being rejected. Insurance company requesting another alternative. [facility] states that rx went thru and pt picked up 37.5 mg tabs.

## 2025-04-04 LAB
ALBUMIN SERPL-MCNC: 4.4 G/DL (ref 3.8–4.8)
ALP SERPL-CCNC: 79 IU/L (ref 44–121)
ALT SERPL-CCNC: 27 IU/L (ref 0–32)
AST SERPL-CCNC: 36 IU/L (ref 0–40)
BILIRUB SERPL-MCNC: 0.4 MG/DL (ref 0–1.2)
BUN SERPL-MCNC: 13 MG/DL (ref 8–27)
BUN/CREAT SERPL: 18 (ref 12–28)
CALCIUM SERPL-MCNC: 9.5 MG/DL (ref 8.7–10.3)
CHLORIDE SERPL-SCNC: 101 MMOL/L (ref 96–106)
CO2 SERPL-SCNC: 29 MMOL/L (ref 20–29)
CREAT SERPL-MCNC: 0.74 MG/DL (ref 0.57–1)
EGFRCR SERPLBLD CKD-EPI 2021: 85 ML/MIN/1.73
GLOBULIN SER CALC-MCNC: 2.1 G/DL (ref 1.5–4.5)
GLUCOSE SERPL-MCNC: 86 MG/DL (ref 70–99)
POTASSIUM SERPL-SCNC: 4.5 MMOL/L (ref 3.5–5.2)
PROT SERPL-MCNC: 6.5 G/DL (ref 6–8.5)
SODIUM SERPL-SCNC: 143 MMOL/L (ref 134–144)

## 2025-04-05 ENCOUNTER — RESULTS FOLLOW-UP (OUTPATIENT)
Age: 75
End: 2025-04-05

## 2025-05-08 ENCOUNTER — TELEPHONE (OUTPATIENT)
Age: 75
End: 2025-05-08

## 2025-07-11 NOTE — PROGRESS NOTES
Patient: Alicia Torres  : 1950    Primary Cardiologist: Tano Dailey MD. [facility]    Today's Date: 2025        HISTORY OF PRESENT ILLNESS:     History of Present Illness:  Presents today for follow-up. BP at home 110/60s. Feels well. Denies chest pain, shortness of breath, edema, palpitations.     Recent admission for CHF  Note states --   Alicia Torres is a 74 y.o. female with PMH significant for  hypertension, hyperlipidemia, mitral valve prolapse s/p mitral valve annuloplasty @ [facility] 2024 radial reconstruction, with ring (32 mm Hargrove Physio II complete annuloplasty band with ring with P2 triangular resection, P1-P2 cleft closure, P2-P3 cleft closure), Operative tissue ablation and reconstruction of atria, performed at the time of other cardiac procedure. EXTENSIVE (Santana Maze IX), Exclusion of left atrial appendage concomitant, any method (35 mm Atricure Flex V AtriClip) subsequently post op developed  junctional bradycardia with 100% dependence on pacemaker via temporary epicardial wires. EP was consulted and decision was made to place permanent pacemaker       Feeling OK overall- Still with class 2 TAYLOR.  No orthopnea.    BP high in evenings.       PAST MEDICAL HISTORY:     Past Medical History:   Diagnosis Date    Atrial fibrillation (HCC)     Dyslipidemia     H/O maze procedure     HTN, goal below 130/80 2015    Hx of bone density study 2016    Osteopenic    Hx of mammogram 07/10/2017    Negtaive     Impaired glucose tolerance 2015    Junctional bradycardia 2024    3/34 post op from heart surgery and mvr-pacemaker placed on 3/22/24    Mitral regurgitation     mild MR    MVP (mitral valve prolapse)     Osteopenia 2015    Pacemaker 2024    PAF (paroxysmal atrial fibrillation) ([facility])     Routine Papanicolaou smear 2018    Neg pap     S/P MVR (mitral valve repair)     Thoracic aortic ectasia 2024 4 cm       Past Surgical History:

## 2025-07-14 DIAGNOSIS — R06.09 DOE (DYSPNEA ON EXERTION): ICD-10-CM

## 2025-07-14 DIAGNOSIS — I10 HTN, GOAL BELOW 130/80: ICD-10-CM

## 2025-07-14 RX ORDER — FUROSEMIDE 20 MG/1
20 TABLET ORAL DAILY
Qty: 90 TABLET | Refills: 1 | Status: SHIPPED | OUTPATIENT
Start: 2025-07-14

## 2025-07-16 ENCOUNTER — OFFICE VISIT (OUTPATIENT)
Age: 75
End: 2025-07-16
Payer: MEDICARE

## 2025-07-16 VITALS
HEIGHT: 63 IN | RESPIRATION RATE: 16 BRPM | BODY MASS INDEX: 19.84 KG/M2 | SYSTOLIC BLOOD PRESSURE: 134 MMHG | HEART RATE: 68 BPM | DIASTOLIC BLOOD PRESSURE: 78 MMHG | OXYGEN SATURATION: 97 % | WEIGHT: 112 LBS

## 2025-07-16 DIAGNOSIS — I05.9 MITRAL VALVE DISEASE: ICD-10-CM

## 2025-07-16 DIAGNOSIS — I50.23 ACUTE ON CHRONIC SYSTOLIC HEART FAILURE (HCC): ICD-10-CM

## 2025-07-16 DIAGNOSIS — Z98.890 S/P MVR (MITRAL VALVE REPAIR): Primary | ICD-10-CM

## 2025-07-16 DIAGNOSIS — E78.5 DYSLIPIDEMIA: ICD-10-CM

## 2025-07-16 DIAGNOSIS — Z95.0 PACEMAKER: ICD-10-CM

## 2025-07-16 DIAGNOSIS — I48.0 PAF (PAROXYSMAL ATRIAL FIBRILLATION) (HCC): ICD-10-CM

## 2025-07-16 PROCEDURE — 99214 OFFICE O/P EST MOD 30 MIN: CPT

## 2025-07-16 PROCEDURE — 3075F SYST BP GE 130 - 139MM HG: CPT

## 2025-07-16 PROCEDURE — G8399 PT W/DXA RESULTS DOCUMENT: HCPCS

## 2025-07-16 PROCEDURE — 1160F RVW MEDS BY RX/DR IN RCRD: CPT

## 2025-07-16 PROCEDURE — 1126F AMNT PAIN NOTED NONE PRSNT: CPT

## 2025-07-16 PROCEDURE — 3017F COLORECTAL CA SCREEN DOC REV: CPT

## 2025-07-16 PROCEDURE — 1090F PRES/ABSN URINE INCON ASSESS: CPT

## 2025-07-16 PROCEDURE — 1123F ACP DISCUSS/DSCN MKR DOCD: CPT

## 2025-07-16 PROCEDURE — 3078F DIAST BP <80 MM HG: CPT

## 2025-07-16 PROCEDURE — 1159F MED LIST DOCD IN RCRD: CPT

## 2025-07-16 PROCEDURE — G8420 CALC BMI NORM PARAMETERS: HCPCS

## 2025-07-16 PROCEDURE — G8427 DOCREV CUR MEDS BY ELIG CLIN: HCPCS

## 2025-07-16 PROCEDURE — 1036F TOBACCO NON-USER: CPT

## 2025-07-16 RX ORDER — AMOXICILLIN 500 MG/1
CAPSULE ORAL
Qty: 4 CAPSULE | Refills: 1 | Status: SHIPPED | OUTPATIENT
Start: 2025-07-16

## 2025-07-16 NOTE — PROGRESS NOTES
At times a pinching feeling under left breast area.  Has a question about when is best time to take metoprolol and entresto     had concerns including Atrial Fibrillation, Congestive Heart Failure, and Hypertension.    Vitals:    07/16/25 0942   BP: 134/78   BP Site: Left Upper Arm   Patient Position: Sitting   BP Cuff Size: Medium Adult   Pulse: 68   Resp: 16   SpO2: 97%   Weight: 50.8 kg (112 lb)   Height: 1.6 m (5' 3\")        Chest pain No    Refills No        1. Have you been to the ER, urgent care clinic since your last visit? No       Hospitalized since your last visit? No       Where?        Date?

## 2025-08-14 ENCOUNTER — HOSPITAL ENCOUNTER (OUTPATIENT)
Facility: HOSPITAL | Age: 75
Discharge: HOME | End: 2025-08-17
Payer: MEDICARE

## 2025-08-14 VITALS — WEIGHT: 112 LBS | BODY MASS INDEX: 19.84 KG/M2

## 2025-08-14 DIAGNOSIS — Z12.31 VISIT FOR SCREENING MAMMOGRAM: ICD-10-CM

## 2025-08-14 PROCEDURE — 77063 BREAST TOMOSYNTHESIS BI: CPT

## 2025-08-25 DIAGNOSIS — I10 HTN, GOAL BELOW 130/80: ICD-10-CM

## 2025-08-25 RX ORDER — POTASSIUM CHLORIDE 750 MG/1
20 TABLET, EXTENDED RELEASE ORAL DAILY
Qty: 60 TABLET | Refills: 1 | Status: SHIPPED | OUTPATIENT
Start: 2025-08-25